# Patient Record
Sex: FEMALE | Race: WHITE | HISPANIC OR LATINO | Employment: UNEMPLOYED | URBAN - METROPOLITAN AREA
[De-identification: names, ages, dates, MRNs, and addresses within clinical notes are randomized per-mention and may not be internally consistent; named-entity substitution may affect disease eponyms.]

---

## 2017-03-14 ENCOUNTER — TELEPHONE (OUTPATIENT)
Dept: FAMILY MEDICINE | Facility: CLINIC | Age: 52
End: 2017-03-14

## 2017-03-14 NOTE — TELEPHONE ENCOUNTER
Panel Management Review      Patient has the following on her problem list: None      Composite cancer screening  Chart review shows that this patient is due/due soon for the following Mammogram and Colonocsopy  Summary:    Patient is due/failing the following:   COLONOSCOPY and MAMMOGRAM    Action needed:   Patient needs referral/order: colonoscopy or FIT test; mammo apt    Type of outreach:    Phone, left message for patient to call back.     Questions for provider review:    None                                                                                                                                    JANET White       Chart routed to Provider .

## 2017-03-17 ENCOUNTER — OFFICE VISIT (OUTPATIENT)
Dept: FAMILY MEDICINE | Facility: CLINIC | Age: 52
End: 2017-03-17
Payer: COMMERCIAL

## 2017-03-17 VITALS
BODY MASS INDEX: 23.3 KG/M2 | SYSTOLIC BLOOD PRESSURE: 114 MMHG | TEMPERATURE: 98.3 F | OXYGEN SATURATION: 97 % | HEART RATE: 54 BPM | WEIGHT: 111 LBS | RESPIRATION RATE: 16 BRPM | DIASTOLIC BLOOD PRESSURE: 63 MMHG | HEIGHT: 58 IN

## 2017-03-17 DIAGNOSIS — Z12.31 ENCOUNTER FOR SCREENING MAMMOGRAM FOR BREAST CANCER: ICD-10-CM

## 2017-03-17 DIAGNOSIS — E03.9 ACQUIRED HYPOTHYROIDISM: ICD-10-CM

## 2017-03-17 DIAGNOSIS — J31.0 CHRONIC RHINITIS: ICD-10-CM

## 2017-03-17 DIAGNOSIS — Z12.11 COLON CANCER SCREENING: Primary | ICD-10-CM

## 2017-03-17 PROCEDURE — 82947 ASSAY GLUCOSE BLOOD QUANT: CPT | Performed by: FAMILY MEDICINE

## 2017-03-17 PROCEDURE — 84443 ASSAY THYROID STIM HORMONE: CPT | Performed by: FAMILY MEDICINE

## 2017-03-17 PROCEDURE — 80061 LIPID PANEL: CPT | Performed by: FAMILY MEDICINE

## 2017-03-17 PROCEDURE — 36415 COLL VENOUS BLD VENIPUNCTURE: CPT | Performed by: FAMILY MEDICINE

## 2017-03-17 PROCEDURE — 99214 OFFICE O/P EST MOD 30 MIN: CPT | Performed by: FAMILY MEDICINE

## 2017-03-17 RX ORDER — LEVOTHYROXINE SODIUM 75 UG/1
75 TABLET ORAL DAILY
Qty: 90 TABLET | Refills: 3 | Status: SHIPPED | OUTPATIENT
Start: 2017-03-17 | End: 2018-05-08

## 2017-03-17 RX ORDER — FLUTICASONE PROPIONATE 50 MCG
SPRAY, SUSPENSION (ML) NASAL
Qty: 16 G | Refills: 0 | Status: SHIPPED | OUTPATIENT
Start: 2017-03-17 | End: 2018-01-12

## 2017-03-17 NOTE — PROGRESS NOTES
SUBJECTIVE:                                                    Bhavani Calderon is a 51 year old female who presents to clinic today for the following health issues:      Hypothyroidism Follow-up      Since last visit, patient describes the following symptoms:  no hair loss, no skin changes, no constipation, no loose stools and weight loss of 6 lbs       Amount of exercise or physical activity: 2-3 days/week for an average of 30-45 minutes    Problems taking medications regularly: No    Medication side effects: none    Diet: regular (no restrictions)      Pt has been having pain in the base of the thumbs, mainly when she open bottles, for the last 2 months.  The pain is when she is writing too or hold something.  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    Problem list and histories reviewed & adjusted, as indicated.  Additional history: as documented    Patient Active Problem List   Diagnosis     CARDIOVASCULAR SCREENING; LDL GOAL LESS THAN 100     Seasonal allergic rhinitis     Rotator cuff syndrome     Pain in joint, shoulder region     Acquired hypothyroidism     No past surgical history on file.    Social History   Substance Use Topics     Smoking status: Never Smoker     Smokeless tobacco: Never Used     Alcohol use No     Family History   Problem Relation Age of Onset     GASTROINTESTINAL DISEASE Father      hepatitis, unclear etiology, may be autoimmune     GASTROINTESTINAL DISEASE Brother      hepatitis, unclear etiology     DIABETES Other          Current Outpatient Prescriptions   Medication Sig Dispense Refill     levothyroxine (SYNTHROID/LEVOTHROID) 75 MCG tablet TAKE 1 TABLET (75 MCG) BY MOUTH DAILY -  SEE MD 30 tablet 0     fluticasone (FLONASE) 50 MCG/ACT spray SPRAY 1-2 SPRAYS INTO BOTH NOSTRILS DAILY 16 g 0     Allergies   Allergen Reactions     Penicillins        Reviewed and updated as needed this visit by clinical staff       Reviewed and updated as needed this visit by Provider         LUCIO:  C:  "NEGATIVE for fever, chills, change in weight  CV: NEGATIVE for chest pain, palpitations or peripheral edema    OBJECTIVE:                                                    /63 (BP Location: Right arm, Patient Position: Chair, Cuff Size: Adult Regular)  Pulse 54  Temp 98.3  F (36.8  C) (Oral)  Resp 16  Ht 4' 10\" (1.473 m)  Wt 111 lb (50.3 kg)  LMP 03/31/2006  SpO2 97%  BMI 23.2 kg/m2  Body mass index is 23.2 kg/(m^2).  GENERAL: healthy, alert and no distress  NECK: no adenopathy, no asymmetry, masses, or scars and thyroid normal to palpation  RESP: lungs clear to auscultation - no rales, rhonchi or wheezes  CV: regular rate and rhythm, normal S1 S2, no S3 or S4, no murmur, click or rub, no peripheral edema and peripheral pulses strong  MS: there is tenderness on the MCp joint of the big thumbs bilaterally, no swelling, with normal ROM of the joints.  The rest of the exam are all normal.           ASSESSMENT/PLAN:                                                            1. Acquired hypothyroidism  Continue on same dose.  - levothyroxine (SYNTHROID/LEVOTHROID) 75 MCG tablet; Take 1 tablet (75 mcg) by mouth daily  Dispense: 90 tablet; Refill: 3  - TSH  - Lipid Profile with reflex to direct LDL  - Glucose    2. Chronic rhinitis    - fluticasone (FLONASE) 50 MCG/ACT spray; SPRAY 1-2 SPRAYS INTO BOTH NOSTRILS DAILY  Dispense: 16 g; Refill: 0    3. Colon cancer screening  Refer to   - GASTROENTEROLOGY ADULT REF PROCEDURE ONLY    4. Encounter for screening mammogram for breast cancer    - *MA Screening Digital Bilateral; Future    Joint pain : mostly related to over use the hands, recommend strengthening exercises, nsaid's and massage, Follow up in 30 days if symptoms persist, sooner if symptoms worsen or new ones develops, pt may contact us over the phone for any questions or concerns.      Manny Martínez MD  [facility]"

## 2017-03-17 NOTE — LETTER
[facility]  [address] [place] [address]  Phone: [phone]    March 17, 2017        Bhavani Calderon  [address] [facility] [address]          To whom it may concern:    RE: Bhavani Calderon    Patient was seen and treated today at our clinic.  Pt does need to her thyroid medication on daily basis.    Please contact me for questions or concerns.      Sincerely,        Manny Martínez MD

## 2017-03-17 NOTE — NURSING NOTE
"Chief Complaint   Patient presents with     Recheck Medication     Refill Request       Initial /63 (BP Location: Right arm, Patient Position: Chair, Cuff Size: Adult Regular)  Pulse 54  Temp 98.3  F (36.8  C) (Oral)  Resp 16  Ht 4' 10\" (1.473 m)  Wt 111 lb (50.3 kg)  LMP 03/31/2006  SpO2 97%  BMI 23.2 kg/m2 Estimated body mass index is 23.2 kg/(m^2) as calculated from the following:    Height as of this encounter: 4' 10\" (1.473 m).    Weight as of this encounter: 111 lb (50.3 kg).  Medication Reconciliation: complete    "

## 2017-03-17 NOTE — MR AVS SNAPSHOT
After Visit Summary   3/17/2017    Bhavani Calderon    MRN: [id number]           Patient Information     Date Of Birth          1965        Visit Information        Provider Department      3/17/2017 2:45 PM Manny Martínez MD [facility]        Today's Diagnoses     Colon cancer screening    -  1    Acquired hypothyroidism        Chronic rhinitis        Encounter for screening mammogram for breast cancer           Follow-ups after your visit        Additional Services     GASTROENTEROLOGY ADULT REF PROCEDURE ONLY       Last Lab Result: Creatinine (mg/dL)       Date                     Value                 11/19/2009               0.88             ----------  Body mass index is 23.2 kg/(m^2).     Needed:  No  Language:  Swiss    Patient will be contacted to schedule procedure.     Please be aware that coverage of these services is subject to the terms and limitations of your health insurance plan.  Call member services at your health plan with any benefit or coverage questions.  Any procedures must be performed at a [facility] facility OR coordinated by your clinic's referral office.    Please bring the following with you to your appointment:    (1) Any X-Rays, CTs or MRIs which have been performed.  Contact the facility where they were done to arrange for  prior to your scheduled appointment.    (2) List of current medications   (3) This referral request   (4) Any documents/labs given to you for this referral                  Future tests that were ordered for you today     Open Future Orders        Priority Expected Expires Ordered    *MA Screening Digital Bilateral Routine  3/17/2018 3/17/2017            Who to contact     If you have questions or need follow up information about today's clinic visit or your schedule please contact [facility] directly at [phone].  Normal or non-critical lab and imaging results will be communicated to you  "by [employer], letter or phone within 4 business days after the clinic has received the results. If you do not hear from us within 7 days, please contact the clinic through [employer] or phone. If you have a critical or abnormal lab result, we will notify you by phone as soon as possible.  Submit refill requests through [employer] or call your pharmacy and they will forward the refill request to us. Please allow 3 business days for your refill to be completed.          Additional Information About Your Visit        [employer] Information     [employer] gives you secure access to your electronic health record. If you see a primary care provider, you can also send messages to your care team and make appointments. If you have questions, please call your primary care clinic.  If you do not have a primary care provider, please call [phone] and they will assist you.        Care EveryWhere ID     This is your Care EveryWhere ID. This could be used by other organizations to access your [facility] medical records  [id number]        Your Vitals Were     Pulse Temperature Respirations Height Last Period Pulse Oximetry    54 98.3  F (36.8  C) (Oral) 16 4' 10\" (1.473 m) 03/31/2006 97%    BMI (Body Mass Index)                   23.2 kg/m2            Blood Pressure from Last 3 Encounters:   03/17/17 114/63   09/08/16 104/64   01/21/16 123/58    Weight from Last 3 Encounters:   03/17/17 111 lb (50.3 kg)   09/08/16 112 lb (50.8 kg)   01/21/16 117 lb (53.1 kg)              We Performed the Following     GASTROENTEROLOGY ADULT REF PROCEDURE ONLY     Glucose     Lipid Profile with reflex to direct LDL     TSH          Today's Medication Changes          These changes are accurate as of: 3/17/17  3:20 PM.  If you have any questions, ask your nurse or doctor.               These medicines have changed or have updated prescriptions.        Dose/Directions    fluticasone 50 MCG/ACT spray   Commonly known as:  FLONASE   This may have changed:  See " the new instructions.   Used for:  Chronic rhinitis   Changed by:  Manny Martínez MD        SPRAY 1-2 SPRAYS INTO BOTH NOSTRILS DAILY   Quantity:  16 g   Refills:  0       levothyroxine 75 MCG tablet   Commonly known as:  SYNTHROID/LEVOTHROID   This may have changed:  See the new instructions.   Used for:  Acquired hypothyroidism   Changed by:  Manny Martínez MD        Dose:  75 mcg   Take 1 tablet (75 mcg) by mouth daily   Quantity:  90 tablet   Refills:  3            Where to get your medicines      These medications were sent to [id number] - [place], [place] - [address]  [address] [facility] [address]    Hours:  9Am-9Pm (M-F) 9Am-6Pm (S&S) Phone:  [phone]     fluticasone 50 MCG/ACT spray    levothyroxine 75 MCG tablet                Primary Care Provider Office Phone # Fax #    Manny Martínez [phone]       [facility] [address] [place] [address]        Thank you!     Thank you for choosing [facility]  for your care. Our goal is always to provide you with excellent care. Hearing back from our patients is one way we can continue to improve our services. Please take a few minutes to complete the written survey that you may receive in the mail after your visit with us. Thank you!             Your Updated Medication List - Protect others around you: Learn how to safely use, store and throw away your medicines at www.disposemymeds.org.          This list is accurate as of: 3/17/17  3:20 PM.  Always use your most recent med list.                   Brand Name Dispense Instructions for use    fluticasone 50 MCG/ACT spray    FLONASE    16 g    SPRAY 1-2 SPRAYS INTO BOTH NOSTRILS DAILY       levothyroxine 75 MCG tablet    SYNTHROID/LEVOTHROID    90 tablet    Take 1 tablet (75 mcg) by mouth daily

## 2017-03-18 LAB
CHOLEST SERPL-MCNC: 183 MG/DL
GLUCOSE SERPL-MCNC: 101 MG/DL (ref 70–99)
HDLC SERPL-MCNC: 58 MG/DL
LDLC SERPL CALC-MCNC: 108 MG/DL
NONHDLC SERPL-MCNC: 125 MG/DL
TRIGL SERPL-MCNC: 83 MG/DL
TSH SERPL DL<=0.05 MIU/L-ACNC: 6.53 MU/L (ref 0.4–4)

## 2017-03-21 ENCOUNTER — TELEPHONE (OUTPATIENT)
Dept: FAMILY MEDICINE | Facility: CLINIC | Age: 52
End: 2017-03-21

## 2017-03-21 DIAGNOSIS — E06.3 HYPOTHYROIDISM DUE TO HASHIMOTO'S THYROIDITIS: Primary | ICD-10-CM

## 2017-03-21 NOTE — TELEPHONE ENCOUNTER
Please send letter with labs.  TSH is abnormal, I don't like to change the dose at this time, but recommend repeating the blood test in 3 months (june)  Labs put in already.  Manny Martínez MD  [facility]  [phone]  r

## 2017-03-21 NOTE — LETTER
[facility]  [address] [place], [address]  [phone]      March 21, 2017    Bhavani Calderon  [address] [facility] [facility] [address]          Dear Bhavani,    The results of your recent test shows your TSH (thyroid) is abnormal.  I don't want to change the dose of your medication at this time.  I do recommend repeating the blood test in 3 months (June).  Enclosed is a copy of the results.  It was a pleasure to see you at your last appointment.    If you have any questions or concerns, please call myself or my nurse at [phone].          Sincerely,    Manny Martínez MD      Results for orders placed or performed in visit on 03/17/17   TSH   Result Value Ref Range    TSH 6.53 (H) 0.40 - 4.00 mU/L   Lipid Profile with reflex to direct LDL   Result Value Ref Range    Cholesterol 183 <200 mg/dL    Triglycerides 83 <150 mg/dL    HDL Cholesterol 58 >49 mg/dL    LDL Cholesterol Calculated 108 (H) <100 mg/dL    Non HDL Cholesterol 125 <130 mg/dL   Glucose   Result Value Ref Range    Glucose 101 (H) 70 - 99 mg/dL

## 2017-03-22 ENCOUNTER — RADIANT APPOINTMENT (OUTPATIENT)
Dept: MAMMOGRAPHY | Facility: CLINIC | Age: 52
End: 2017-03-22
Payer: COMMERCIAL

## 2017-03-22 DIAGNOSIS — Z12.31 ENCOUNTER FOR SCREENING MAMMOGRAM FOR BREAST CANCER: ICD-10-CM

## 2017-03-22 PROCEDURE — G0202 SCR MAMMO BI INCL CAD: HCPCS | Mod: TC

## 2017-04-07 ENCOUNTER — HOSPITAL ENCOUNTER (OUTPATIENT)
Facility: CLINIC | Age: 52
Discharge: HOME | End: 2017-04-07
Attending: INTERNAL MEDICINE | Admitting: INTERNAL MEDICINE
Payer: COMMERCIAL

## 2017-04-07 VITALS
SYSTOLIC BLOOD PRESSURE: 127 MMHG | OXYGEN SATURATION: 95 % | RESPIRATION RATE: 12 BRPM | DIASTOLIC BLOOD PRESSURE: 81 MMHG

## 2017-04-07 LAB — COLONOSCOPY: NORMAL

## 2017-04-07 PROCEDURE — G0121 COLON CA SCRN NOT HI RSK IND: HCPCS | Performed by: INTERNAL MEDICINE

## 2017-04-07 PROCEDURE — 25000125 ZZHC RX 250: Performed by: INTERNAL MEDICINE

## 2017-04-07 PROCEDURE — 25000128 H RX IP 250 OP 636: Performed by: INTERNAL MEDICINE

## 2017-04-07 PROCEDURE — G0500 MOD SEDAT ENDO SERVICE >5YRS: HCPCS | Performed by: INTERNAL MEDICINE

## 2017-04-07 PROCEDURE — 45378 DIAGNOSTIC COLONOSCOPY: CPT | Performed by: INTERNAL MEDICINE

## 2017-04-07 RX ORDER — ONDANSETRON 4 MG/1
4 TABLET, ORALLY DISINTEGRATING ORAL EVERY 6 HOURS PRN
Status: DISCONTINUED | OUTPATIENT
Start: 2017-04-07 | End: 2017-04-07 | Stop reason: HOSPADM

## 2017-04-07 RX ORDER — FLUMAZENIL 0.1 MG/ML
0.2 INJECTION, SOLUTION INTRAVENOUS
Status: DISCONTINUED | OUTPATIENT
Start: 2017-04-07 | End: 2017-04-07 | Stop reason: HOSPADM

## 2017-04-07 RX ORDER — LIDOCAINE 40 MG/G
CREAM TOPICAL
Status: DISCONTINUED | OUTPATIENT
Start: 2017-04-07 | End: 2017-04-07 | Stop reason: HOSPADM

## 2017-04-07 RX ORDER — ONDANSETRON 2 MG/ML
4 INJECTION INTRAMUSCULAR; INTRAVENOUS EVERY 6 HOURS PRN
Status: DISCONTINUED | OUTPATIENT
Start: 2017-04-07 | End: 2017-04-07 | Stop reason: HOSPADM

## 2017-04-07 RX ORDER — FENTANYL CITRATE 50 UG/ML
INJECTION, SOLUTION INTRAMUSCULAR; INTRAVENOUS PRN
Status: DISCONTINUED | OUTPATIENT
Start: 2017-04-07 | End: 2017-04-07 | Stop reason: HOSPADM

## 2017-04-07 RX ORDER — NALOXONE HYDROCHLORIDE 0.4 MG/ML
.1-.4 INJECTION, SOLUTION INTRAMUSCULAR; INTRAVENOUS; SUBCUTANEOUS
Status: DISCONTINUED | OUTPATIENT
Start: 2017-04-07 | End: 2017-04-07 | Stop reason: HOSPADM

## 2017-04-07 RX ORDER — ONDANSETRON 2 MG/ML
4 INJECTION INTRAMUSCULAR; INTRAVENOUS
Status: COMPLETED | OUTPATIENT
Start: 2017-04-07 | End: 2017-04-07

## 2017-04-07 NOTE — H&P
Pre-Endoscopy History and Physical     Bhavani Calderon MRN# [id number]   YOB: 1965 Age: 51 year old     Date of Procedure: 4/7/2017  Primary care provider: Manny Martínez  Type of Endoscopy: colonoscopy  Reason for Procedure: screening  Type of Anesthesia Anticipated: Conscious Sedation    HPI:    Bhavnai is a 51 year old female who will be undergoing the above procedure.      A history and physical has been performed. The patient's medications and allergies have been reviewed. The risks and benefits of the procedure and the sedation options and risks were discussed with the patient.  All questions were answered and informed consent was obtained.      She denies a personal or family history of anesthesia complications or bleeding disorders.     Patient Active Problem List   Diagnosis     CARDIOVASCULAR SCREENING; LDL GOAL LESS THAN 100     Seasonal allergic rhinitis     Rotator cuff syndrome     Pain in joint, shoulder region     Hypothyroidism due to Hashimoto's thyroiditis        Past Medical History:   Diagnosis Date     Acquired hypothyroidism 1/21/2016     Grave's disease     High TSI titer, s/p I-131May 2009     Hypothyroidism due to Hashimoto's thyroiditis 3/21/2017     Menopausal state      Rotator cuff syndrome 7/16/2014     Seasonal allergic rhinitis 4/11/2014        History reviewed. No pertinent surgical history.    Relevant Family History: NONE    Relevant Social History: NONE     Prior to Admission medications    Medication Sig Start Date End Date Taking? Authorizing Provider   levothyroxine (SYNTHROID/LEVOTHROID) 75 MCG tablet Take 1 tablet (75 mcg) by mouth daily 3/17/17  Yes Manny Martínez MD   fluticasone (FLONASE) 50 MCG/ACT spray SPRAY 1-2 SPRAYS INTO BOTH NOSTRILS DAILY 3/17/17  Yes Manny Martínez MD       Allergies   Allergen Reactions     Penicillins         REVIEW OF SYSTEMS:   A relevant review of systems was performed and was negative    PHYSICAL EXAM:   /68  Resp 12  LMP  "03/31/2006  SpO2 98% Estimated body mass index is 23.2 kg/(m^2) as calculated from the following:    Height as of 3/17/17: 1.473 m (4' 10\").    Weight as of 3/17/17: 50.3 kg (111 lb).   GENERAL APPEARANCE: alert, and oriented  MENTAL STATUS: alert  AIRWAY EXAM: Normal  RESP: lungs clear to auscultation - no rales, rhonchi or wheezes  CV: regular rates and rhythm  DIAGNOSTICS:    Not indicated    IMPRESSION   ASA Class 2 - Mild systemic disease    PLAN:   Plan for colonoscopy. We discussed the risks, benefits and alternatives and the patient wished to proceed.      Signed Electronically by: Christos Pena  April 7, 2017            "

## 2017-04-07 NOTE — DISCHARGE INSTRUCTIONS

## 2017-04-07 NOTE — IP AVS SNAPSHOT
[facility]    [address]    [facility] [address]    Phone:  [phone]    Fax:  [phone]                                       After Visit Summary   4/7/2017    Bhavani Calderon    MRN: [id number]           After Visit Summary Signature Page     I have received my discharge instructions, and my questions have been answered. I have discussed any challenges I see with this plan with the nurse or doctor.    ..........................................................................................................................................  Patient/Patient Representative Signature      ..........................................................................................................................................  Patient Representative Print Name and Relationship to Patient    ..................................................               ................................................  Date                                            Time    ..........................................................................................................................................  Reviewed by Signature/Title    ...................................................              ..............................................  Date                                                            Time

## 2017-04-07 NOTE — LETTER
March 24, 2017      Bhavani Calderon  [address] [facility] [address]              Dear Bhavnai Calderon,    Thank you for choosing [facility]. You are scheduled for the following service(s).   Miralax Prep    Procedure:   Colonoscopy   Provider:         Dr. Christos Pena MD  Date:    4/07/2017                Arrival Time:   8:00 AM  *check in at Emergency/Endoscopy desk*  Procedure Time:  8:30 AM     Location:   [facility]      Endoscopy Department, First Floor (Enter through ER Doors) *       [address] [place], [place] [address]    [phone] or [phone] () to reschedule   What is a colonoscopy?  A colonoscopy is the most accurate test to detect colon polyps and colon cancer, and the only test where polyps can be removed. During this procedure, a doctor examines the lining of your large intestine and rectum through a flexible tube called a colonoscope.   To produce the best and most accurate results, your colon must be completely clean. You will drink a special bowel cleansing preparation to help clean out your colon. You will also need to follow a special diet several days prior to your scheduled colonoscopy.  What happens during a colonoscopy?  Plan to spend up to two hours, starting at registration time, at the endoscopy center the day of your procedure. The exam itself takes approximately 15 minutes to complete, and recovery is approximately 30 minutes.     Before the exam:    You will change into a gown.    Your medical history will be reviewed with you and you will be given a consent form to sign.     A nurse will insert an intravenous (IV) line into your hand or arm.  During the exam:     Medicine will be given through the IV line to help you relax and feel drowsy.     Your heart rate and oxygen levels will be monitored. If your blood pressure is low, you may be given fluids through the IV line.     The doctor will insert a  flexible hollow tube, called a colonoscope, into your rectum.   The scope will be advanced slowly through the large intestine (colon).    You may have a feeling of pressure or fullness.     If an abnormal tissue, or a polyp are found, the doctor may remove it through the endoscope for closer examination, or biopsy. Tissue removal is painless.  What happens after the exam?           The doctor will talk with you about the initial results of your exam.     The doctor will prepare a full report for the physician who referred you for the colonoscopy.     You may feel bloated after the procedure. This is normal.     Medication given during the exam will prohibit you from driving for the rest of the day.     Following the exam, you may resume your normal diet. Avoid alcohol until the next day.     You may resume your regular activities the day after the procedure.     A nurse will provide you with complete discharge instructions before you leave the endoscopy center. Be sure to ask the nurse for specific instructions if you take blood thinners such as aspirin, Coumadin or Plavix.     Any tissue samples removed during the exam will be sent to a lab for evaluation. It may take 5-7 working days for you to be notified of the results.     Miralax-Gatorade  If you have diabetes, ask your regular doctor for diet and medication restrictions.   If you take a medication to thin your blood, such as coumadin or warfarin, please call your primary care provider for directions on when to stop this medication.  If you take aspirin, you may continue to do so.  If you are or may be pregnant, please discuss the risks and benefits of this procedure with your doctor.  You must arrange for a ride for the day of your exam. If you fail to arrange transportation with a responsible adult (someone you know and trust) your procedure will need to be cancelled and rescheduled.  If you must cancel or reschedule your appointment, please call  [phone] as soon as possible.        PREPARATION  To ensure a successful exam, please follow all instructions carefully. Failure to accurately and completely prepare for your exam may result in the need for an additional procedure and both procedures will be billed to your insurance.     Purchase the following over-the-counter supplies at your local pharmacy:      ? 2 tablets bisacodyl, each containing 5 mg of bisacodyl (Dulcolax  laxative NOT Dulcolax  stool softener)   ? 1-8.3 oz. bottle Miralax  powder (238 grams)   ? 64 oz. Gatorade  liquid (NOT red; NOT powdered). Regular Gatorade , Gatorade G2 , Powerade  or  PoweradeZero  are acceptable.   ? 1-10 oz. bottle Magnesium Citrate (NOT red)  7 days before your exam:  Discontinue fiber supplements or medications containing iron. This includes multivitamins with iron, Metamucil  and Fibercon .    3 Days prior to your exam:  Stop eating all high-fiber foods and begin a Low-Fiber Diet. A low fiber diet helps make the cleanout more effective.     Examples of a Low Fiber Diet include:     White bread, white rice, pasta, potato without skin, plain crackers     Fish, white meat chicken, eggs, peanut butter without nuts     Clear beverages (apple juice, white grape juice, Sprite , sparkling water, Gatorade )     Cooked carrots, cooked green beans, cooked spinach        Milk, plain yogurt, cheese     Jelly, salt, pepper, sugar  Avoid: Raw fruits or vegetables, whole wheat or high fiber foods, seeds, nuts, popcorn, bran or bulking agents     2 days prior to your exam     Continue Low Fiber Diet.     Drink at least 8 (eight ounces) glasses of water throughout the day.     Refrigerate the Gatorade  or Powerade , if you wish to drink it cold.     Stop eating solid foods at 11:45 pm.    1 day prior to your exam  Start a Clear Liquid Diet   Examples of a Clear Liquid Diet:     No red liquids; No coffee; No alcohol; No dairy products     May drink clear caffeinated  beverages    Water: drink at least 8 glasses of water during the day     Tea (do not add milk or creamer)     Clear broth or bouillon     Gatorade , Pedialyte  or Powerade  (No red)     Carbonated and non-carbonated soft drinks (Sprite , 7-Up , Ginger ale)     Strained fruit juices without pulp (apple, white grape, white cranberry)     Jell-O , popsicles, hard candy (No red)    At 12 Noon:  Take the 2 bisacodyl (Dulcolax ) tablets    At 4 PM (no later than 6 PM)    Mix 1 bottle of Miralax  (8.3 oz) with 64 oz. of Gatorade  in a large pitcher.     Drink 1 - 8 oz. glass of the Miralax /Gatorade  solution.     Continue drinking 1 - 8 oz. glass every 15 minutes thereafter until the mixture is gone.     Continue clear liquid diet     Colon Cleansing Tips     If you experience nausea or vomiting while taking the prep, rinse your mouth with water,  or mouthwash , take a 15-30 minute break, and then continue taking the prep solution. If you are still unable to complete the prep, without severe nausea or vomiting, you will need to contact your primary care provider (the provider who ordered the test) for a possible anti-nausea medication. Or if you are prone to nausea you may want to ask your primary care provider to prescribe an as needed anti-nausea medication that you may have on hand.    Chill the Miralax /Gatorade  solution in the refrigerator. DO NOT add ice to the solution or your drinking glass.      Set a timer for every 15 minutes. Drink each 8 - oz. glass of solution quickly to help flush your colon.     Stay near a toilet! You will have diarrhea.     Even if you are sitting on the toilet, continue to drink the cleansing solution every 15 minutes.     Drink all of the solution until it is gone.     You will be uncomfortable until the stool has flushed from your colon (in about 2-4 hours). You may feel chilled.     You may suck on a few hard candies (NO red).     Alcohol-free baby wipes or Vaseline  may help ease  skin irritation.     Over-the-counter hydrocortisone creams, hemorrhoid treatments or Tucks may be used if desired.    The day of your exam:            Continue clear liquid diet. Do not eat solid foods.     You may take all of your morning medications.    4 hours before your procedure:     Drink 10 oz. of Magnesium Citrate.    2 hours before your procedure:     Stop drinking clear liquids.     Allow extra time to travel to your procedure as you may need to stop and use a restroom along the way.  You are ready for the exam, if you followed all instructions and your stool is no longer formed, but clear or yellow liquid. If you are unsure whether your colon is clean, please call our department at [phone] before you leave for your appointment.      Bring a list of all of your current medications, including any allergy or over-the-counter medications.      Bring a photo ID, as well as up-to-date insurance information, such as your insurance card and any referral forms that might be required by your insurance company.  DIRECTIONS  From the north ([facility], [place])  Take [address] south, exit to [address] [place] [address]. Get into the left hand chanda, turn left (east), go one-half mile to [address]. Turn left (north) on [address]. Go north to first stoplight, take a right on the newly constructed [address] and follow it to the Emergency entrance.  From the south ([facility])  Take 35 north to the east split, [address], and exit to [address] [place] [address]. Turn left (west) on [address] [address] to [address]. Turn right (north) on [address]. Go north to first stoplight, take a right on the newly [address] and follow it to the Emergency entrance.    From the east via 35E ([facility])  Take [address]E south to [address] [place] [address] exit. Turn right on [address] [address]. Go west to [address]. Turn right (north) on [address], go to the first stoplight, take a right and  follow the newly constructed road, [address], to the Emergency entrance.    From the east via [address] ([facility])  Take [address] west to [address]. Turn left (south) on [address] to [address]. Turn left (east) on [address] and follow the newly constructed road to the Emergency entrance.    From the west via [address] 13 ([place])  Take [address] east to [address]. Turn right (south) on [address] to [address].  Turn left (east) on [address] and follow the newly constructed road to the Emergency entrance.  Cut along line  -------------------------------------------------------------------------------------------------------------------  SHOPPING LIST FOR OVER-THE-COUNTER COLONOSCOPY PREP:  ? 2 tablets bisacodyl, each containing 5 mg of bisacodyl (Dulcolax  laxative                     NOT Dulcolax  stool softener)   ? 1-8.3 oz. bottle Miralax  powder (238 grams)   ? 64 oz. Gatorade  liquid (NOT red; NOT powdered). Regular Gatorade ,                     Gatorade G2 , Powerade  or  PoweradeZero  are acceptable.   ? 1-10 oz. bottle Magnesium Citrate (NOT red)

## 2017-04-07 NOTE — IP AVS SNAPSHOT
MRN:[id number]                      After Visit Summary   4/7/2017    Bhavani Calderon    MRN: [id number]           Thank you!     Thank you for choosing [facility] for your care. Our goal is always to provide you with excellent care. Hearing back from our patients is one way we can continue to improve our services. Please take a few minutes to complete the written survey that you may receive in the mail after you visit. If you would like to speak to someone directly about your visit please contact Patient Relations at [phone]. Thank you!          Patient Information     Date Of Birth          1965        About your hospital stay     You were admitted on:  April 7, 2017 You last received care in the:  [facility]    You were discharged on:  April 7, 2017       Who to Call     For medical emergencies, please call 911.  For non-urgent questions about your medical care, please call your primary care provider or clinic, [phone]  For questions related to your surgery, please call your surgery clinic        Attending Provider     Provider Specialty    Link, Christos STEVEN MD Gastroenterology       Primary Care Provider Office Phone # Fax #    Manny Martínez [phone]       [facility] [address]  [id number]        Further instructions from your care team         Understanding Diverticulosis and Diverticulitis     Pouches or diverticula usually occur in the lower part of the colon called the sigmoid.      Diverticulitis occurs when the pouches become inflamed.     The colon (large intestine) is the last part of the digestive tract. It absorbs water from stool and changes it from a liquid to a solid. In certain cases, small pouches called diverticula can form in the colon wall. This condition is called diverticulosis. The pouches can become infected. If this happens, it becomes a more serious problem called diverticulitis.  These problems can be painful. But they can be managed.   Managing Your Condition  Diet changes or taking medications are often tried first. These may be enough to bring relief. If the case is bad, surgery may be done. You and your doctor can discuss the plan that is best for you.  If You Have Diverticulosis  Diet changes are often enough to control symptoms. The main changes are adding fiber (roughage) and drinking more water. Fiber absorbs water as it travels through your colon. This helps your stool stay soft and move smoothly. Water helps this process. If needed, you may be told to take over-the-counter stool softeners. To help relieve pain, antispasmodic medications may be prescribed.  If You Have Diverticulitis  Treatment depends on how bad your symptoms are.  For mild symptoms: You may be put on a liquid diet for a short time. You may also be prescribed antibiotics. If these two steps relieve your symptoms, you may then be prescribed a high-fiber diet. If you still have symptoms, your doctor will discuss further treatment options with you.  For severe symptoms: You may need to be admitted to the hospital. There, you can be given IV antibiotics and fluids. Once symptoms are under control, the above treatments may be tried. If these don t control your condition, your doctor may discuss the option of having surgery with you.  [place] to Colon Health  Help keep your colon healthy with a diet that includes plenty of high-fiber fruits, vegetables, and whole grains. Drink plenty of liquids like water and juice. Your doctor may also recommend avoiding seeds and nuts.          [id number] [address], [place] [address]. All rights reserved. This information is not intended as a substitute for professional medical care. Always follow your healthcare professional's instructions.    Eating a High-Fiber Diet  Fiber is what gives strength and structure to plants. Most grains, beans, vegetables, and  fruits contain fiber. Foods rich in fiber are often low in calories and fat, and they fill you up more. They may also reduce your risks for certain health problems. To find out the amount of fiber in canned, packaged, or frozen foods, read the  Nutrition Facts  label. It tells you how much fiber is in a serving.      Types of Fiber and Their Benefits  There are two types of fiber: insoluble and soluble. They both aid digestion and help you maintain a healthy weight.  Insoluble fiber: This is found in whole grains, cereals, certain fruits and vegetables (such as apple skin, corn, and carrots). Insoluble fiber may prevent constipation and reduce the risk of certain types of cancer.   Soluble fiber: This type of fiber is in oats, beans, and certain fruits and vegetables (such as strawberries and peas). Soluble fiber can reduce cholesterol (which may help lower the risk of heart disease), and helps control blood sugar levels.  Look for High-Fiber Foods  Whole-grain breads and cereals: Try to eat 6-8 ounces a day. Include wheat and oat bran cereals, whole-wheat muffins or toast, and corn tortillas in your meals.  Fruits: Try to eat 2 cups a day. Apples, oranges, strawberries, pears, and bananas are good sources. (Note: Fruit juice is low in fiber.)  Vegetables: Try to eat 3 cups a day. Add asparagus, carrots, broccoli, peas, and corn to your meals.  Legumes (beans): One cup of cooked lentils gives you over 15 grams of fiber. Try navy beans, lentils, and chickpeas.  Seeds:  A small handful of seeds gives you about 3 grams of fiber. Try sunflower seeds.    Keep Track of Your Fiber  A healthy diet includes 31 grams of fiber a day if you have a 2,000-calorie diet. Keep track of how much fiber you eat. Start by reading food labels. Then eat a variety of foods high in fiber. Ask your doctor about supplemental fiber products.            9568-2603 Tami Garg, [address] [facility], [place] [address]. All rights reserved.  This information is not intended as a substitute for professional medical care. Always follow your healthcare professional's instructions.    Pending Results     No orders found from 4/5/2017 to 4/8/2017.            Admission Information     Date & Time Provider Department Dept. Phone    4/7/2017 Link, Christos STEVEN MD [facility] [phone]      Your Vitals Were     Blood Pressure Respirations Last Period Pulse Oximetry          128/82 14 03/31/2006 96%        MyChart Information     "[employer]t gives you secure access to your electronic health record. If you see a primary care provider, you can also send messages to your care team and make appointments. If you have questions, please call your primary care clinic.  If you do not have a primary care provider, please call [phone] and they will assist you.        Care EveryWhere ID     This is your Care EveryWhere ID. This could be used by other organizations to access your [facility] medical records  [id number]           Review of your medicines      UNREVIEWED medicines. Ask your doctor about these medicines        Dose / Directions    fluticasone 50 MCG/ACT spray   Commonly known as:  FLONASE   Used for:  Chronic rhinitis        SPRAY 1-2 SPRAYS INTO BOTH NOSTRILS DAILY   Quantity:  16 g   Refills:  0       levothyroxine 75 MCG tablet   Commonly known as:  SYNTHROID/LEVOTHROID   Used for:  Acquired hypothyroidism        Dose:  75 mcg   Take 1 tablet (75 mcg) by mouth daily   Quantity:  90 tablet   Refills:  3                Protect others around you: Learn how to safely use, store and throw away your medicines at www.disposemymeds.org.             Medication List: This is a list of all your medications and when to take them. Check marks below indicate your daily home schedule. Keep this list as a reference.      Medications           Morning Afternoon Evening Bedtime As Needed    fluticasone 50 MCG/ACT spray   Commonly known as:  FLONASE   SPRAY 1-2  SPRAYS INTO BOTH NOSTRILS DAILY                                levothyroxine 75 MCG tablet   Commonly known as:  SYNTHROID/LEVOTHROID   Take 1 tablet (75 mcg) by mouth daily

## 2018-01-12 DIAGNOSIS — J31.0 CHRONIC RHINITIS: ICD-10-CM

## 2018-01-12 NOTE — TELEPHONE ENCOUNTER
"Requested Prescriptions   Pending Prescriptions Disp Refills     fluticasone (FLONASE) 50 MCG/ACT spray [Pharmacy Med Name: Fluticasone Propionate Nasal Suspension 50 MCG/ACT] 16 g 0     Sig: SPRAY 1 TO 2 SPRAYS INTO BOTH NOSTRILS DAILY.    Inhaled Steroids Protocol Passed    1/12/2018  2:29 PM       Passed - Patient is age 12 or older       Passed - Recent or future visit with authorizing provider's specialty    Patient had office visit in the last year or has a visit in the next 30 days with authorizing provider.  See \"Patient Info\" tab in inbasket, or \"Choose Columns\" in Meds & Orders section of the refill encounter.               Last Written Prescription Date:  03/17/17  Last Fill Quantity: 16g,  # refills: 0   Last Office Visit with G, P or [facility] prescribing provider:  03/17/17 w/Dr Martínez   Future Office Visit:       "

## 2018-01-16 RX ORDER — FLUTICASONE PROPIONATE 50 MCG
SPRAY, SUSPENSION (ML) NASAL
Qty: 16 G | Refills: 0 | Status: SHIPPED | OUTPATIENT
Start: 2018-01-16 | End: 2018-05-14

## 2018-01-16 NOTE — TELEPHONE ENCOUNTER
Prescription approved per [facility] Refill Protocol.    Gabriela AMAYA RN, BSN, PHN  [place] Flex RN

## 2018-05-08 DIAGNOSIS — E03.9 ACQUIRED HYPOTHYROIDISM: ICD-10-CM

## 2018-05-08 NOTE — LETTER
May 9, 2018          Bhavani Calderon  [address] [facility] [address]        Dear Bhavani,     We sent a one month refill of levothyroxine to your pharmacy today. This is a reminder to schedule an office visit with your doctor before the next refill. Your last clinic visit was   March 17, 2017.    Taking care of your health is important to us and ongoing visits with a provider are vital to your care. Please let us know if you have any questions about this reminder.    Sincerely,     KERA Wilkinson - Care Team of Manny Martínez MD

## 2018-05-08 NOTE — TELEPHONE ENCOUNTER
"Last Written Prescription Date:  3/17/17  Last Fill Quantity: 90 tablet,  # refills: 3   Last office visit: 3/17/2017 with prescribing provider:  Mauricio   Future Office Visit:      Requested Prescriptions   Pending Prescriptions Disp Refills     levothyroxine (SYNTHROID/LEVOTHROID) 75 MCG tablet [Pharmacy Med Name: Levothyroxine Sodium Oral Tablet 75 MCG] 90 tablet 2     Sig: TAKE 1 TABLET BY MOUTH ONCE DAILY.    Thyroid Protocol Failed    5/8/2018  7:00 AM       Failed - Recent (12 mo) or future (30 days) visit within the authorizing provider's specialty    Patient had office visit in the last 12 months or has a visit in the next 30 days with authorizing provider or within the authorizing provider's specialty.  See \"Patient Info\" tab in inbasket, or \"Choose Columns\" in Meds & Orders section of the refill encounter.           Failed - Normal TSH on file in past 12 months    Recent Labs   Lab Test  03/17/17   1523   TSH  6.53*             Passed - Patient is 12 years or older       Passed - No active pregnancy on record    If patient is pregnant or has had a positive pregnancy test, please check TSH.         Passed - No positive pregnancy test in past 12 months    If patient is pregnant or has had a positive pregnancy test, please check TSH.            "

## 2018-05-09 RX ORDER — LEVOTHYROXINE SODIUM 75 UG/1
TABLET ORAL
Qty: 30 TABLET | Refills: 0 | Status: SHIPPED | OUTPATIENT
Start: 2018-05-09 | End: 2018-07-02

## 2018-05-09 NOTE — TELEPHONE ENCOUNTER
Medication is being filled for 1 time refill only due to:  Patient needs to be seen because it has been more than one year since last visit. .   #30   Letter mailed  Prescription approved per [facility] Refill Protocol.  Minh Desir RN

## 2018-05-14 DIAGNOSIS — J31.0 CHRONIC RHINITIS: ICD-10-CM

## 2018-05-14 NOTE — TELEPHONE ENCOUNTER
"Requested Prescriptions   Pending Prescriptions Disp Refills     fluticasone (FLONASE) 50 MCG/ACT spray [Pharmacy Med Name: Fluticasone Propionate Nasal Suspension 50 MCG/ACT]  Last Written Prescription Date:  1/16/2018  Last Fill Quantity: 16 g,  # refills: 0   Last office visit: 3/17/2017 with prescribing provider:  Mauricio    16 g 0     Sig: SPRAY 1 TO 2 SPRAYS INTO BOTH NOSTRILS DAILY.    Inhaled Steroids Protocol Failed    5/14/2018  3:44 PM       Failed - Recent (12 mo) or future (30 days) visit within the authorizing provider's specialty    Patient had office visit in the last 12 months or has a visit in the next 30 days with authorizing provider or within the authorizing provider's specialty.  See \"Patient Info\" tab in inbasket, or \"Choose Columns\" in Meds & Orders section of the refill encounter.           Passed - Patient is age 12 or older          "

## 2018-05-15 RX ORDER — FLUTICASONE PROPIONATE 50 MCG
SPRAY, SUSPENSION (ML) NASAL
Qty: 1 BOTTLE | Refills: 0 | Status: SHIPPED | OUTPATIENT
Start: 2018-05-15 | End: 2018-07-20

## 2018-05-15 NOTE — TELEPHONE ENCOUNTER
Medication is being filled for 1 time refill only due to:  Patient needs to be seen because it has been more than one year since last visit.     1 time refill issued. Per May 8th refill encounter, letter already mailed to the Pt advising OV.     Kasie Rubio RN -- [facility]

## 2018-07-02 DIAGNOSIS — E03.9 ACQUIRED HYPOTHYROIDISM: ICD-10-CM

## 2018-07-03 RX ORDER — LEVOTHYROXINE SODIUM 75 UG/1
TABLET ORAL
Qty: 7 TABLET | Refills: 0 | Status: SHIPPED | OUTPATIENT
Start: 2018-07-03 | End: 2018-07-20

## 2018-07-03 NOTE — TELEPHONE ENCOUNTER
Prescription approved per [facility] Refill Protocol. See 5/9/18 letter informing due for visit and #30 sent. No future appointment scheduled.   #7 only today. No further refills per Dr. Ra Linda message sent today.   Minh Desir RN

## 2018-07-03 NOTE — TELEPHONE ENCOUNTER
"Requested Prescriptions   Pending Prescriptions Disp Refills     levothyroxine (SYNTHROID/LEVOTHROID) 75 MCG tablet [Pharmacy Med Name: Levothyroxine Sodium Oral Tablet 75 MCG] 30 tablet 0     Sig: TAKE ONE TABLET BY MOUTH ONE TIME DAILY. NEED AN APPOINTMENT FOR MORE REFILLS.    Thyroid Protocol Failed    7/2/2018  2:03 PM       Failed - Recent (12 mo) or future (30 days) visit within the authorizing provider's specialty    Patient had office visit in the last 12 months or has a visit in the next 30 days with authorizing provider or within the authorizing provider's specialty.  See \"Patient Info\" tab in inbasket, or \"Choose Columns\" in Meds & Orders section of the refill encounter.           Failed - Normal TSH on file in past 12 months    Recent Labs   Lab Test  03/17/17   1523   TSH  6.53*             Passed - Patient is 12 years or older       Passed - No active pregnancy on record    If patient is pregnant or has had a positive pregnancy test, please check TSH.         Passed - No positive pregnancy test in past 12 months    If patient is pregnant or has had a positive pregnancy test, please check TSH.          Last refill 3/17/2017-Dr Martínez  "

## 2018-07-10 ENCOUNTER — APPOINTMENT (OUTPATIENT)
Dept: GENERAL RADIOLOGY | Facility: CLINIC | Age: 53
End: 2018-07-10
Attending: EMERGENCY MEDICINE
Payer: COMMERCIAL

## 2018-07-10 ENCOUNTER — HOSPITAL ENCOUNTER (EMERGENCY)
Facility: CLINIC | Age: 53
Discharge: HOME | End: 2018-07-11
Attending: EMERGENCY MEDICINE | Admitting: EMERGENCY MEDICINE
Payer: COMMERCIAL

## 2018-07-10 VITALS
RESPIRATION RATE: 18 BRPM | HEART RATE: 53 BPM | SYSTOLIC BLOOD PRESSURE: 123 MMHG | OXYGEN SATURATION: 98 % | WEIGHT: 113 LBS | HEIGHT: 60 IN | DIASTOLIC BLOOD PRESSURE: 57 MMHG | TEMPERATURE: 98 F | BODY MASS INDEX: 22.19 KG/M2

## 2018-07-10 DIAGNOSIS — S86.011A STRAIN OF RIGHT ACHILLES TENDON, INITIAL ENCOUNTER: ICD-10-CM

## 2018-07-10 DIAGNOSIS — S32.10XA CLOSED FRACTURE OF SACRUM, UNSPECIFIED PORTION OF SACRUM, INITIAL ENCOUNTER (H): ICD-10-CM

## 2018-07-10 DIAGNOSIS — S30.0XXA CONTUSION OF LOWER BACK, INITIAL ENCOUNTER: ICD-10-CM

## 2018-07-10 PROCEDURE — 25000132 ZZH RX MED GY IP 250 OP 250 PS 637: Performed by: EMERGENCY MEDICINE

## 2018-07-10 PROCEDURE — 72220 X-RAY EXAM SACRUM TAILBONE: CPT

## 2018-07-10 PROCEDURE — 72100 X-RAY EXAM L-S SPINE 2/3 VWS: CPT

## 2018-07-10 PROCEDURE — 99284 EMERGENCY DEPT VISIT MOD MDM: CPT

## 2018-07-10 RX ORDER — ACETAMINOPHEN 325 MG/1
650 TABLET ORAL ONCE
Status: COMPLETED | OUTPATIENT
Start: 2018-07-10 | End: 2018-07-10

## 2018-07-10 RX ADMIN — ACETAMINOPHEN 650 MG: 325 TABLET, FILM COATED ORAL at 22:54

## 2018-07-10 ASSESSMENT — ENCOUNTER SYMPTOMS
ARTHRALGIAS: 1
BACK PAIN: 1
HEADACHES: 0
CONSTIPATION: 0
DIZZINESS: 0
NUMBNESS: 0

## 2018-07-10 NOTE — ED AVS SNAPSHOT
[facility] Emergency Department    [address] [facility] [address]    Phone:  [phone]    Fax:  [phone]                                       Bhavani Calderon   MRN: [id number]    Department:  [facility] Emergency Department   Date of Visit:  7/10/2018           Patient Information     Date Of Birth          1965        Your diagnoses for this visit were:     Contusion of lower back, initial encounter     Strain of right Achilles tendon, initial encounter     Closed fracture of sacrum, unspecified portion of sacrum, initial encounter (H)        You were seen by Melanie Garcia MD.      Follow-up Information     [facility]    Why:  3-5 days as needed    Contact information:    [address]  [address]  [facility] [id number] [phone]        Follow up with [facility] Emergency Department.    Specialty:  EMERGENCY MEDICINE    Why:  As needed, If symptoms worsen    Contact information:    [facility] [address] [phone]        Discharge Instructions       You may have a strain or contusion of your Achilles tendon. Wear your boot for comfort. Follow-up with orthopedics in 5-7 days as needed.     Discharge References/Attachments     PELVIC FRACTURE (ENGLISH)    BACK CONTUSION (Persian)      Your next 10 appointments already scheduled     Jul 12, 2018 12:45 PM CDT   SHORT with Manny Martínez MD   [facility] ([facility])    [address] [place] [address] [phone]              24 Hour Appointment Hotline       To make an appointment at any [facility], call [phone] ([phone]). If you don't have a family doctor or clinic, we will help you find one. [facility] are conveniently located to serve the needs of you and your family.             Review of your medicines       Our records show that you are taking the medicines listed below. If these are incorrect, please call your family doctor or clinic.        Dose / Directions Last dose taken    fluticasone 50 MCG/ACT spray   Commonly known as:  FLONASE   Quantity:  1 Bottle        SPRAY 1 TO 2 SPRAYS INTO BOTH NOSTRILS DAILY.   Refills:  0        levothyroxine 75 MCG tablet   Commonly known as:  SYNTHROID/LEVOTHROID   Quantity:  7 tablet        TAKE ONE TABLET BY MOUTH ONE TIME DAILY. NEED AN APPOINTMENT FOR MORE REFILLS.   Refills:  0                Procedures and tests performed during your visit     Lumbar spine XR, 2-3 views    XR Sacrum and Coccyx 2 Views      Orders Needing Specimen Collection     None      Pending Results     Date and Time Order Name Status Description    7/10/2018 2239 Lumbar spine XR, 2-3 views Preliminary     7/10/2018 2239 XR Sacrum and Coccyx 2 Views Preliminary             Pending Culture Results     No orders found from 7/8/2018 to 7/11/2018.            Pending Results Instructions     If you had any lab results that were not finalized at the time of your Discharge, you can call the ED Lab Result RN at [phone]. You will be contacted by this team for any positive Lab results or changes in treatment. The nurses are available 7 days a week from 10A to 6:30P.  You can leave a message 24 hours per day and they will return your call.        Test Results From Your Hospital Stay        7/10/2018 11:40 PM      Narrative     XR SACRUM AND COCCYX 2 VW  7/10/2018 11:12 PM      HISTORY: Trauma, pain.     COMPARISON: None.        Impression     IMPRESSION: On the lateral view there is suggestion of a nondisplaced  fracture of the midsacrum.         7/10/2018 11:40 PM      Narrative     XR LUMBAR SPINE 2-3 VIEWS  7/10/2018 11:12 PM     HISTORY: Trauma, pain.    COMPARISON: None.         Impression     IMPRESSION: No acute fracture or malalignment. Mild multilevel  degenerative disease.                Clinical  Quality Measure: Blood Pressure Screening     Your blood pressure was checked while you were in the emergency department today. The last reading we obtained was  BP: 123/57 . Please read the guidelines below about what these numbers mean and what you should do about them.  If your systolic blood pressure (the top number) is less than 120 and your diastolic blood pressure (the bottom number) is less than 80, then your blood pressure is normal. There is nothing more that you need to do about it.  If your systolic blood pressure (the top number) is 120-139 or your diastolic blood pressure (the bottom number) is 80-89, your blood pressure may be higher than it should be. You should have your blood pressure rechecked within a year by a primary care provider.  If your systolic blood pressure (the top number) is 140 or greater or your diastolic blood pressure (the bottom number) is 90 or greater, you may have high blood pressure. High blood pressure is treatable, but if left untreated over time it can put you at risk for heart attack, stroke, or kidney failure. You should have your blood pressure rechecked by a primary care provider within the next 4 weeks.  If your provider in the emergency department today gave you specific instructions to follow-up with your doctor or provider even sooner than that, you should follow that instruction and not wait for up to 4 weeks for your follow-up visit.        Thank you for choosing [facility]       Thank you for choosing [facility] for your care. Our goal is always to provide you with excellent care. Hearing back from our patients is one way we can continue to improve our services. Please take a few minutes to complete the written survey that you may receive in the mail after you visit with us. Thank you!        [facility] Information     [facility] gives you secure access to your electronic health record. If you see a primary care provider, you can also send messages to your care team and  make appointments. If you have questions, please call your primary care clinic.  If you do not have a primary care provider, please call [phone] and they will assist you.        Care EveryWhere ID     This is your Care EveryWhere ID. This could be used by other organizations to access your [place] medical records  [id number]        Equal Access to Services     ALYSHA LOZADA : Linnette Watson, tyrone rodas, ramin tinajero, emeterio woodson . So [facility] [phone].    ATENCIÓN: Si habla español, tiene a mitchell disposición servicios gratuitos de asistencia lingüística. Llame al [phone].    We comply with applicable federal civil rights laws and [place] laws. We do not discriminate on the basis of race, color, national origin, age, disability, sex, sexual orientation, or gender identity.            After Visit Summary       This is your record. Keep this with you and show to your community pharmacist(s) and doctor(s) at your next visit.

## 2018-07-10 NOTE — LETTER
[facility] EMERGENCY DEPARTMENT  [address]  [facility] [address]  Phone: [phone]  Fax: [phone]    July 10, 2018        Bhavani Calderon  [address] [facility] [address]          To whom it may concern:    RE: Bhavani Calderon    Patient was seen and treated today at our emergency room. Please excuse her from work until 7/16/18 for medical reasons.    Please contact me for questions or concerns.      Sincerely,        Melanie Garcia MD

## 2018-07-10 NOTE — ED AVS SNAPSHOT
[facility] Emergency Department    [address]    [facility] [address]    Phone:  [phone]    Fax:  [phone]                                       Bhavani Calderon   MRN: [id number]    Department:  [facility] Emergency Department   Date of Visit:  7/10/2018           After Visit Summary Signature Page     I have received my discharge instructions, and my questions have been answered. I have discussed any challenges I see with this plan with the nurse or doctor.    ..........................................................................................................................................  Patient/Patient Representative Signature      ..........................................................................................................................................  Patient Representative Print Name and Relationship to Patient    ..................................................               ................................................  Date                                            Time    ..........................................................................................................................................  Reviewed by Signature/Title    ...................................................              ..............................................  Date                                                            Time

## 2018-07-11 NOTE — DISCHARGE INSTRUCTIONS
You may have a strain or contusion of your Achilles tendon. Wear your boot for comfort. Follow-up with orthopedics in 5-7 days as needed.

## 2018-07-11 NOTE — ED TRIAGE NOTES
Pt arrives to the ED for lower back pain. Pt states she fell on her steps going backwards and hit her tailbone on the step around 1500. Pt took ibuprofen last at 1600 with some mild relief. Denies any issues with bowel or bladder or numbness/tingling in legs. CMS intact.

## 2018-07-11 NOTE — ED NOTES
Pt provided with discharge paperwork and educated on recommended follow-up with orthopedics. Pt educated on how to wear ortho boot on right foot for achilles tendon along with how to manage pain appropriately at home. Pt voiced understanding and denied any questions at discharge. Pt brought to lobby in wheelchair.

## 2018-07-17 ENCOUNTER — TRANSFERRED RECORDS (OUTPATIENT)
Dept: HEALTH INFORMATION MANAGEMENT | Facility: CLINIC | Age: 53
End: 2018-07-17

## 2018-07-20 ENCOUNTER — OFFICE VISIT (OUTPATIENT)
Dept: FAMILY MEDICINE | Facility: CLINIC | Age: 53
End: 2018-07-20
Payer: COMMERCIAL

## 2018-07-20 VITALS
SYSTOLIC BLOOD PRESSURE: 122 MMHG | TEMPERATURE: 98.2 F | BODY MASS INDEX: 22.58 KG/M2 | WEIGHT: 115 LBS | HEART RATE: 46 BPM | HEIGHT: 60 IN | RESPIRATION RATE: 16 BRPM | OXYGEN SATURATION: 98 % | DIASTOLIC BLOOD PRESSURE: 72 MMHG

## 2018-07-20 DIAGNOSIS — M54.41 ACUTE RIGHT-SIDED LOW BACK PAIN WITH RIGHT-SIDED SCIATICA: ICD-10-CM

## 2018-07-20 DIAGNOSIS — J30.2 CHRONIC SEASONAL ALLERGIC RHINITIS, UNSPECIFIED TRIGGER: ICD-10-CM

## 2018-07-20 DIAGNOSIS — E06.3 HYPOTHYROIDISM DUE TO HASHIMOTO'S THYROIDITIS: Primary | ICD-10-CM

## 2018-07-20 PROCEDURE — 36415 COLL VENOUS BLD VENIPUNCTURE: CPT | Performed by: FAMILY MEDICINE

## 2018-07-20 PROCEDURE — 99214 OFFICE O/P EST MOD 30 MIN: CPT | Performed by: FAMILY MEDICINE

## 2018-07-20 PROCEDURE — 84443 ASSAY THYROID STIM HORMONE: CPT | Performed by: FAMILY MEDICINE

## 2018-07-20 RX ORDER — LEVOTHYROXINE SODIUM 75 UG/1
TABLET ORAL
Qty: 90 TABLET | Refills: 3 | Status: SHIPPED | OUTPATIENT
Start: 2018-07-20 | End: 2018-07-23

## 2018-07-20 RX ORDER — PREDNISONE 20 MG/1
40 TABLET ORAL DAILY
Qty: 10 TABLET | Refills: 0 | Status: SHIPPED | OUTPATIENT
Start: 2018-07-20 | End: 2019-02-01

## 2018-07-20 RX ORDER — FLUTICASONE PROPIONATE 50 MCG
2 SPRAY, SUSPENSION (ML) NASAL DAILY
Qty: 1 BOTTLE | Refills: 3 | Status: SHIPPED | OUTPATIENT
Start: 2018-07-20 | End: 2019-02-01

## 2018-07-20 NOTE — MR AVS SNAPSHOT
After Visit Summary   7/20/2018    Bhavani Calderon    MRN: [id number]           Patient Information     Date Of Birth          1965        Visit Information        Provider Department      7/20/2018 9:30 AM Manny Martínez MD [facility]        Today's Diagnoses     Hypothyroidism due to Hashimoto's thyroiditis    -  1    Chronic seasonal allergic rhinitis, unspecified trigger        Acute right-sided low back pain with right-sided sciatica           Follow-ups after your visit        Follow-up notes from your care team     Return in about 2 weeks (around 8/3/2018).      Future tests that were ordered for you today     Open Future Orders        Priority Expected Expires Ordered    MR Lumbar Spine w/o Contrast Routine  7/20/2019 7/20/2018            Who to contact     If you have questions or need follow up information about today's clinic visit or your schedule please contact [facility] directly at [phone].  Normal or non-critical lab and imaging results will be communicated to you by MyChart, letter or phone within 4 business days after the clinic has received the results. If you do not hear from us within 7 days, please contact the clinic through [facility] or phone. If you have a critical or abnormal lab result, we will notify you by phone as soon as possible.  Submit refill requests through [employer] or call your pharmacy and they will forward the refill request to us. Please allow 3 business days for your refill to be completed.          Additional Information About Your Visit        MyChart Information     [employer] gives you secure access to your electronic health record. If you see a primary care provider, you can also send messages to your care team and make appointments. If you have questions, please call your primary care clinic.  If you do not have a primary care provider, please call [phone] and they will assist you.        Care EveryWhere ID      This is your Care EveryWhere ID. This could be used by other organizations to access your [place] medical records  [id number]        Your Vitals Were     Pulse Temperature Respirations Height Last Period Pulse Oximetry    46 98.2  F (36.8  C) (Oral) 16 5' (1.524 m) 03/31/2006 98%    BMI (Body Mass Index)                   22.46 kg/m2            Blood Pressure from Last 3 Encounters:   07/20/18 122/72   07/10/18 123/57   04/07/17 127/81    Weight from Last 3 Encounters:   07/20/18 115 lb (52.2 kg)   07/10/18 113 lb (51.3 kg)   03/17/17 111 lb (50.3 kg)              We Performed the Following     TSH          Today's Medication Changes          These changes are accurate as of 7/20/18 10:06 AM.  If you have any questions, ask your nurse or doctor.               Start taking these medicines.        Dose/Directions    predniSONE 20 MG tablet   Commonly known as:  DELTASONE   Used for:  Acute right-sided low back pain with right-sided sciatica   Started by:  Manny Martínez MD        Dose:  40 mg   Take 2 tablets (40 mg) by mouth daily for 5 days   Quantity:  10 tablet   Refills:  0         These medicines have changed or have updated prescriptions.        Dose/Directions    fluticasone 50 MCG/ACT spray   Commonly known as:  FLONASE   This may have changed:  See the new instructions.   Used for:  Chronic seasonal allergic rhinitis, unspecified trigger   Changed by:  Manny Marítnez MD        Dose:  2 spray   Spray 2 sprays into both nostrils daily   Quantity:  1 Bottle   Refills:  3       levothyroxine 75 MCG tablet   Commonly known as:  SYNTHROID/LEVOTHROID   This may have changed:  See the new instructions.   Used for:  Hypothyroidism due to Hashimoto's thyroiditis   Changed by:  Manny Martínez MD        Take one pill once daily.   Quantity:  90 tablet   Refills:  3            Where to get your medicines      These medications were sent to [id number] - [place], [place] - [address]  [id number] [place] [address]  [place] [address]    Hours:  9Am-9Pm (M-F) 9Am-6Pm (S&S) Phone:  [phone]     fluticasone 50 MCG/ACT spray    levothyroxine 75 MCG tablet    predniSONE 20 MG tablet                Primary Care Provider Office Phone # Fax #    Manny Martínez [phone] [address]  [facility] [address]        Equal Access to Services     ALYSHA LOZADA : Hadii fransisco ku hadasho Soomaali, waaxda luqadaha, qaybta kaalmada adeegyada, waxmalathi malin haykathleenn adeilana chin chintan . So [facility] [phone].    ATENCIÓN: Si juan cosby, tiene a mitchell disposición servicios gratuitos de asistencia lingüística. [facility] [phone].    We comply with applicable federal civil rights laws and [place] laws. We do not discriminate on the basis of race, color, national origin, age, disability, sex, sexual orientation, or gender identity.            Thank you!     Thank you for choosing [facility]  for your care. Our goal is always to provide you with excellent care. Hearing back from our patients is one way we can continue to improve our services. Please take a few minutes to complete the written survey that you may receive in the mail after your visit with us. Thank you!             Your Updated Medication List - Protect others around you: Learn how to safely use, store and throw away your medicines at www.disposemymeds.org.          This list is accurate as of 7/20/18 10:06 AM.  Always use your most recent med list.                   Brand Name Dispense Instructions for use Diagnosis    fluticasone 50 MCG/ACT spray    FLONASE    1 Bottle    Spray 2 sprays into both nostrils daily    Chronic seasonal allergic rhinitis, unspecified trigger       levothyroxine 75 MCG tablet    SYNTHROID/LEVOTHROID    90 tablet    Take one pill once daily.    Hypothyroidism due to Hashimoto's thyroiditis       predniSONE 20 MG tablet    DELTASONE    10 tablet    Take 2 tablets (40 mg) by mouth daily for 5 days    Acute right-sided low  back pain with right-sided sciatica

## 2018-07-20 NOTE — PROGRESS NOTES
SUBJECTIVE:   Bhavani Calderon is a 52 year old female who presents to clinic today for the following health issues:      Back Pain       Duration: 10 days ago        Specific cause: fall     Description:   Location of pain: low back center and middle of back center  Character of pain: sharp and constant  Pain radiation:none  New numbness or weakness in legs, not attributed to pain:  no     Intensity: Currently 7/10, At its worst 10/10, moderate, severe    History:   Pain interferes with job: YES  History of back problems: no prior back problems  Any previous MRI or X-rays: yes, pt went to ER and had Xray of the lower back and sacrum.  Sees a specialist for back pain:  No  Therapies tried without relief: none    Alleviating factors:   Improved by: acetaminophen (Tylenol) and cold      Precipitating factors:  Worsened by: Lifting, Bending, Sitting and Lying Flat    Functional and Psychosocial Screen (Cole STarT Back):      Not performed today          Accompanying Signs & Symptoms:  Risk of Fracture:  Pt did have sacral fracture  Risk of Cauda Equina:  None  Risk of Infection:  None  Risk of Cancer:  None  Risk of Ankylosing Spondylitis:  Onset at age <35, male, AND morning back stiffness. no         Pt went to ER for evaluation for the fall, and had an Xray of the sacrum  That showed small no displaced fracture of the sacrum.  Went to orthopedic and was told to treat conservatively.  But her pain is getting worse, and now there is pain in the rt buttock and difficulty walking on her the Rt foot             Problem list and histories reviewed & adjusted, as indicated.  Additional history: as documented    Patient Active Problem List   Diagnosis     CARDIOVASCULAR SCREENING; LDL GOAL LESS THAN 100     Seasonal allergic rhinitis     Rotator cuff syndrome     Pain in joint, shoulder region     Hypothyroidism due to Hashimoto's thyroiditis     Past Surgical History:   Procedure Laterality Date     COLONOSCOPY N/A  4/7/2017    Procedure: COLONOSCOPY;  Surgeon: Christos Pena MD;  Location:  GI       Social History   Substance Use Topics     Smoking status: Never Smoker     Smokeless tobacco: Never Used     Alcohol use No     Family History   Problem Relation Age of Onset     GASTROINTESTINAL DISEASE Father      hepatitis, unclear etiology, may be autoimmune     GASTROINTESTINAL DISEASE Brother      hepatitis, unclear etiology     Diabetes Other            Reviewed and updated as needed this visit by clinical staff  Tobacco  Allergies  Meds  Med Hx  Surg Hx  Fam Hx  Soc Hx      Reviewed and updated as needed this visit by Provider         ROS:  CONSTITUTIONAL: NEGATIVE for fever, chills, change in weight  NEURO: NEGATIVE for weakness, dizziness or paresthesias    OBJECTIVE:     /72 (BP Location: Right arm, Patient Position: Chair, Cuff Size: Adult Regular)  Pulse (!) 46  Temp 98.2  F (36.8  C) (Oral)  Resp 16  Ht 5' (1.524 m)  Wt 115 lb (52.2 kg)  LMP 03/31/2006  SpO2 98%  BMI 22.46 kg/m2  Body mass index is 22.46 kg/(m^2).  Patient appears to be in moderate pain, positie antalgic gait noted.   Lumbosacral spine area reveals positive Rt  local tenderness but no mass.    ROM of the LS spine showed unable due to pain .extension unable  flexionunalbe   Straight leg raise is positive at 30 degrees on right.  L4 :toe walk hard to walk patellar reflux nl medial foot sensation decreased on the Rt side.  L5: dorsiflexion of the big toe nl,mid foot sensation nl  S1: heel walk hard to walk, [place] reflex not done, lateral sensation of the foot  nl     Peripheral pulses are palpable.           ASSESSMENT/PLAN:             1. Hypothyroidism due to Hashimoto's thyroiditis  Check TSH, and refill her thyroid medications.  - levothyroxine (SYNTHROID/LEVOTHROID) 75 MCG tablet; Take one pill once daily.  Dispense: 90 tablet; Refill: 3  - TSH    2. Chronic seasonal allergic rhinitis, unspecified trigger    - fluticasone  (FLONASE) 50 MCG/ACT spray; Spray 2 sprays into both nostrils daily  Dispense: 1 Bottle; Refill: 3    3. Acute right-sided low back pain with right-sided sciatica, with mld weakness on the Rt foot on exam.  Mostly sciatica, will start a course of prednisone, and check MR of the back.  - predniSONE (DELTASONE) 20 MG tablet; Take 2 tablets (40 mg) by mouth daily for 5 days  Dispense: 10 tablet; Refill: 0  - MR Lumbar Spine w/o Contrast; Future    FUTURE APPOINTMENTS:       - Follow-up visit in 1 to 2 weeks.     Manny Martínez MD  [facility]

## 2018-07-21 LAB — TSH SERPL DL<=0.005 MIU/L-ACNC: 24.73 MU/L (ref 0.4–4)

## 2018-07-23 ENCOUNTER — TELEPHONE (OUTPATIENT)
Dept: FAMILY MEDICINE | Facility: CLINIC | Age: 53
End: 2018-07-23

## 2018-07-23 DIAGNOSIS — E06.3 HYPOTHYROIDISM DUE TO HASHIMOTO'S THYROIDITIS: ICD-10-CM

## 2018-07-23 RX ORDER — LEVOTHYROXINE SODIUM 100 UG/1
100 TABLET ORAL DAILY
Qty: 90 TABLET | Refills: 1 | Status: SHIPPED | OUTPATIENT
Start: 2018-07-23 | End: 2019-02-01

## 2018-07-23 NOTE — TELEPHONE ENCOUNTER
Please call Annette, her labs are showing low thyroid, I would like to increase her thyroid medicine to 100 mcg daily, (instead of 75 mcg) and want her to do lab only blood test in 3 months (october) to recheck her thyroid level.  Manny Martínez MD  [facility]  [phone]

## 2018-07-24 ENCOUNTER — HOSPITAL ENCOUNTER (OUTPATIENT)
Dept: MRI IMAGING | Facility: CLINIC | Age: 53
Discharge: HOME | End: 2018-07-24
Attending: FAMILY MEDICINE | Admitting: FAMILY MEDICINE
Payer: COMMERCIAL

## 2018-07-24 DIAGNOSIS — M54.41 ACUTE RIGHT-SIDED LOW BACK PAIN WITH RIGHT-SIDED SCIATICA: ICD-10-CM

## 2018-07-24 PROCEDURE — 72148 MRI LUMBAR SPINE W/O DYE: CPT

## 2018-07-25 ENCOUNTER — TELEPHONE (OUTPATIENT)
Dept: FAMILY MEDICINE | Facility: CLINIC | Age: 53
End: 2018-07-25

## 2018-07-25 DIAGNOSIS — S22.080A COMPRESSION FRACTURE OF T12 VERTEBRA (H): ICD-10-CM

## 2018-07-25 DIAGNOSIS — S32.010A CLOSED COMPRESSION FRACTURE OF FIRST LUMBAR VERTEBRA, INITIAL ENCOUNTER: Primary | ICD-10-CM

## 2018-07-25 RX ORDER — CALCITONIN SALMON 200 [IU]/.09ML
1 SPRAY, METERED NASAL DAILY
Qty: 1 BOTTLE | Refills: 0 | Status: SHIPPED | OUTPATIENT
Start: 2018-07-25 | End: 2019-02-01

## 2018-07-25 NOTE — TELEPHONE ENCOUNTER
Patient informed. Expressed understanding.    Dexa Scan ordered per written below.    Gabriela AMAYA RN, BSN, PHN  [facility] RN

## 2018-07-25 NOTE — TELEPHONE ENCOUNTER
Please call patient, her MRI Is showing fracture of the T12 and L1 (mid lower part of her back) that is mostly related to her fall.  But given her age, and the fact that she has 2 fractures, I wonder if her bones are slightly fragile (osteoporosis)  2 things :  One : I would like her to see Orthopedic for that matter soon, I put the order in and they will call her for an appointment.  Two : I will start her on nasal spray to take one spray daily.  Three : I will order Dexa scan for her bones, please make sure she schedule one to evaluate how strong her bones are.

## 2018-08-02 ENCOUNTER — RADIANT APPOINTMENT (OUTPATIENT)
Dept: BONE DENSITY | Facility: CLINIC | Age: 53
End: 2018-08-02
Payer: COMMERCIAL

## 2018-08-02 DIAGNOSIS — S22.080A COMPRESSION FRACTURE OF T12 VERTEBRA (H): ICD-10-CM

## 2018-08-02 DIAGNOSIS — S32.010A CLOSED COMPRESSION FRACTURE OF FIRST LUMBAR VERTEBRA, INITIAL ENCOUNTER: ICD-10-CM

## 2018-08-02 PROCEDURE — 77085 DXA BONE DENSITY AXL VRT FX: CPT | Performed by: FAMILY MEDICINE

## 2018-08-06 ENCOUNTER — OFFICE VISIT (OUTPATIENT)
Dept: NEUROSURGERY | Facility: CLINIC | Age: 53
End: 2018-08-06
Attending: NURSE PRACTITIONER
Payer: COMMERCIAL

## 2018-08-06 VITALS
OXYGEN SATURATION: 96 % | WEIGHT: 114.4 LBS | HEART RATE: 59 BPM | BODY MASS INDEX: 24.01 KG/M2 | SYSTOLIC BLOOD PRESSURE: 107 MMHG | HEIGHT: 58 IN | DIASTOLIC BLOOD PRESSURE: 62 MMHG

## 2018-08-06 DIAGNOSIS — S22.080A T12 COMPRESSION FRACTURE (H): ICD-10-CM

## 2018-08-06 DIAGNOSIS — S32.018A OTHER CLOSED FRACTURE OF FIRST LUMBAR VERTEBRA, INITIAL ENCOUNTER (H): Primary | ICD-10-CM

## 2018-08-06 PROCEDURE — G0463 HOSPITAL OUTPT CLINIC VISIT: HCPCS

## 2018-08-06 PROCEDURE — 99204 OFFICE O/P NEW MOD 45 MIN: CPT | Performed by: NURSE PRACTITIONER

## 2018-08-06 RX ORDER — TRAMADOL HYDROCHLORIDE 50 MG/1
50 TABLET ORAL EVERY 6 HOURS PRN
Qty: 40 TABLET | Refills: 0 | Status: SHIPPED | OUTPATIENT
Start: 2018-08-06 | End: 2019-02-01

## 2018-08-06 ASSESSMENT — PAIN SCALES - GENERAL: PAINLEVEL: WORST PAIN (10)

## 2018-08-06 NOTE — PROGRESS NOTES
"Dr. Pedro Nye  [facility]  Neurosurgery Clinic Visit        CC: Low back pain; L1 compression fracture    Primary care Provider: Manny Martínez      Reason For Visit:   I was asked by Dr. Martínez to consult on the patient for closed L1 compression fracture.      HPI: Bhavani Calderon is a 52 year old female with mid to lower back pain.  She suffered a fall on 7/9/18 where she describes falling down 2-3 steps and hitting her back as she fell.  She was seen on 7/10/18 and Xrays showed a nondisplaced sacral fracture, but no acute findings on lumbar Xrays.  She was seen by ortho and recommendation was to treat conservatively.  She states the pain continued to bother her, specifically her low back pain.  She had a lumbar MRI on 7/24/18 which showed T12 and L1 compression fractures.  She states the pain is a constant dull to sharp pain to the lower back and it radiates upwards.  The pain increases with activities including prolonged standing, walking and any bending or reaching.  She has found little to provide relief other then sitting and lying down.  She denies shooting pain into the BLE but describe an intermittent \"pressure\" to the groin.  She denies falls or weakness to BLE.  She denies foot drop.      Pain right now: 9    Past Medical History:   Diagnosis Date     Acquired hypothyroidism 1/21/2016     Grave's disease     High TSI titer, s/p I-131May 2009     Hypothyroidism due to Hashimoto's thyroiditis 3/21/2017     Menopausal state      Rotator cuff syndrome 7/16/2014     Seasonal allergic rhinitis 4/11/2014       Past Medical History reviewed with patient during visit.    Past Surgical History:   Procedure Laterality Date     COLONOSCOPY N/A 4/7/2017    Procedure: COLONOSCOPY;  Surgeon: Christos Pena MD;  Location:  GI     Past Surgical History reviewed with patient during visit.    Current Outpatient Prescriptions   Medication     calcitonin, salmon, (MIACALCIN) 200 UNIT/ACT nasal spray "     fluticasone (FLONASE) 50 MCG/ACT spray     levothyroxine (SYNTHROID/LEVOTHROID) 100 MCG tablet     No current facility-administered medications for this visit.        Allergies   Allergen Reactions     Penicillins        Social History     Social History     Marital status:      Spouse name: N/A     Number of children: N/A     Years of education: N/A     Social History Main Topics     Smoking status: Never Smoker     Smokeless tobacco: Never Used     Alcohol use No     Drug use: No     Sexual activity: Not Currently     Other Topics Concern     Not on file     Social History Narrative       Family History   Problem Relation Age of Onset     GASTROINTESTINAL DISEASE Father      hepatitis, unclear etiology, may be autoimmune     GASTROINTESTINAL DISEASE Brother      hepatitis, unclear etiology     Diabetes Other          ROS: 10 point ROS neg other than the symptoms noted above in the HPI.    Vital Signs: LMP 03/31/2006    Examination:  Constitutional:  Alert, well nourished, NAD.  HEENT: Normocephalic, atraumatic.   Pulm:  Without shortness of breath   CV:  No pitting edema of BLE.    Neurological:  Awake  Alert  Oriented x 3  Speech clear  Cranial nerves II - XII intact    Motor exam   Shoulder Abduction:  Right:  5/5   Left:  5/5  Biceps:                      Right:  5/5   Left:  5/5  Triceps:                     Right:  5/5   Left:  5/5  Wrist Extensors:       Right:  5/5   Left:  5/5  Wrist Flexors:           Right:  5/5   Left:  5/5  Intrinsics:                   Right:  5/5   Left:  5/5  Hip Flexor:                Right: 5/5  Left:  5/5  Hip Adductor:             Right:  5/5  Left:  5/5  Hip Abductor:             Right:  5/5  Left:  5/5  Gastroc Soleus:        Right:  5/5  Left:  5/5  Tib/Ant:                      Right:  5/5  Left:  5/5  EHL:                          Right:  5/5  Left:  5/5   Sensation normal to bilateral upper and lower extremities  Clonus negative  Gait: Able to stand from a  seated position. Normal non-antalgic, non-myelopathic gait.  Able to heel/toe walk without loss of balance  Cervical examination reveals good range of motion.  No tenderness to palpation of the cervical spine or paraspinous muscles bilaterally.    Thoracic examination reveals tenderness to palpation of spine midline and paraspinous muscles at lower thoracic spine.    Lumbar examination reveals tenderness of the spine and paraspinous muscles, most notable at upper lumbar spine.  Hip height is symmetrical. Negative SI joint, sciatic notch or greater trochanteric tenderness to palpation bilaterally.  Straight leg raise is negative bilaterally.  ROM limited due to pain.    Imaging:   IMPRESSION:   1. Acute/subacute incomplete compression fractures without wedge deformity at the anterior-inferior aspects of T12 and L1.  2. Prominent sacral nerve root sheath cysts as described above.    3. Multilevel early degenerative disc disease with no acute disc  protrusion or significant central or foraminal stenosis.    Assessment/Plan:   T12 compression fracture  L1 compression fracture    Bhavani Calderon is a 52 year old female with mid to lower back pain.  She suffered a fall on 7/9/18 where she describes falling down 2-3 steps and hitting her back as she fell.  She was seen on 7/10/18 and Xrays showed a nondisplaced sacral fracture, but no acute findings on lumbar Xrays.  She was seen by ortho and recommendation was to treat conservatively.  She states the pain continued to bother her, specifically her low back pain.  She had a lumbar MRI on 7/24/18 which showed T12 and L1 compression fractures.  She states the pain is a constant dull to sharp pain to the lower back and it radiates upwards.  The pain increases with activities including prolonged standing, walking and any bending or reaching.  She has found little to provide relief other then sitting and lying down.  She denies shooting pain into the BLE but describe an  "intermittent \"pressure\" to the groin.  She denies falls or weakness to BLE.  She denies foot drop.  We reviewed MRI findings and discussed bracing and/or consideration of kyphoplasty.  The patient is not interested in kyphoplasty, and agrees to TLSO when out of bed, minimum of 12 weeks.  We discussed restrictions and she states she needs to continue to work, but will follow restrictions of no lifting >10 pounds and no repetitive bending or reaching.  A work letter was written.  She was given a prescription for Tramadol for prn pain and verbalized understanding of correct use.  She will return in 4-5 weeks for repeat Xrays.  Fitting for TLSO today.  She will contact us if pain increases, weakness or pain in the legs and/or further concerns.     Patient Instructions   -Tramadol as needed  -No lifting more than 10 pounds  -Brace when out of bed  -Return in 4-5 weeks with Xrays  -Please contact the clinic if pain persists at [phone].        Sugar Sanz [facility]  [facility]  [address] [address]    Tel [phone]  Pager [phone]  "

## 2018-08-06 NOTE — LETTER
"    8/6/2018         RE: Bhavani Calderon  [address] [facility] [address]        Dear Colleague,    Thank you for referring your patient, Bhavani Calderon, to the [facility]. Please see a copy of my visit note below.    Dr. Pedro Nye  [facility]  Neurosurgery Clinic Visit        CC: Low back pain; L1 compression fracture    Primary care Provider: Manny Martínez      Reason For Visit:   I was asked by Dr. Martínez to consult on the patient for closed L1 compression fracture.      HPI: Bhavani Calderon is a 52 year old female with mid to lower back pain.  She suffered a fall on 7/9/18 where she describes falling down 2-3 steps and hitting her back as she fell.  She was seen on 7/10/18 and Xrays showed a nondisplaced sacral fracture, but no acute findings on lumbar Xrays.  She was seen by ortho and recommendation was to treat conservatively.  She states the pain continued to bother her, specifically her low back pain.  She had a lumbar MRI on 7/24/18 which showed T12 and L1 compression fractures.  She states the pain is a constant dull to sharp pain to the lower back and it radiates upwards.  The pain increases with activities including prolonged standing, walking and any bending or reaching.  She has found little to provide relief other then sitting and lying down.  She denies shooting pain into the BLE but describe an intermittent \"pressure\" to the groin.  She denies falls or weakness to BLE.  She denies foot drop.      Pain right now: 9    Past Medical History:   Diagnosis Date     Acquired hypothyroidism 1/21/2016     Grave's disease     High TSI titer, s/p I-131May 2009     Hypothyroidism due to Hashimoto's thyroiditis 3/21/2017     Menopausal state      Rotator cuff syndrome 7/16/2014     Seasonal allergic rhinitis 4/11/2014       Past Medical History reviewed with patient during visit.    Past Surgical History:   Procedure Laterality Date     COLONOSCOPY N/A " 4/7/2017    Procedure: COLONOSCOPY;  Surgeon: Christos Pena MD;  Location:  GI     Past Surgical History reviewed with patient during visit.    Current Outpatient Prescriptions   Medication     calcitonin, salmon, (MIACALCIN) 200 UNIT/ACT nasal spray     fluticasone (FLONASE) 50 MCG/ACT spray     levothyroxine (SYNTHROID/LEVOTHROID) 100 MCG tablet     No current facility-administered medications for this visit.        Allergies   Allergen Reactions     Penicillins        Social History     Social History     Marital status:      Spouse name: N/A     Number of children: N/A     Years of education: N/A     Social History Main Topics     Smoking status: Never Smoker     Smokeless tobacco: Never Used     Alcohol use No     Drug use: No     Sexual activity: Not Currently     Other Topics Concern     Not on file     Social History Narrative       Family History   Problem Relation Age of Onset     GASTROINTESTINAL DISEASE Father      hepatitis, unclear etiology, may be autoimmune     GASTROINTESTINAL DISEASE Brother      hepatitis, unclear etiology     Diabetes Other          ROS: 10 point ROS neg other than the symptoms noted above in the HPI.    Vital Signs: LMP 03/31/2006    Examination:  Constitutional:  Alert, well nourished, NAD.  HEENT: Normocephalic, atraumatic.   Pulm:  Without shortness of breath   CV:  No pitting edema of BLE.    Neurological:  Awake  Alert  Oriented x 3  Speech clear  Cranial nerves II - XII intact    Motor exam   Shoulder Abduction:  Right:  5/5   Left:  5/5  Biceps:                      Right:  5/5   Left:  5/5  Triceps:                     Right:  5/5   Left:  5/5  Wrist Extensors:       Right:  5/5   Left:  5/5  Wrist Flexors:           Right:  5/5   Left:  5/5  Intrinsics:                   Right:  5/5   Left:  5/5  Hip Flexor:                Right: 5/5  Left:  5/5  Hip Adductor:             Right:  5/5  Left:  5/5  Hip Abductor:             Right:  5/5  Left:  5/5  Gastroc  Soleus:        Right:  5/5  Left:  5/5  Tib/Ant:                      Right:  5/5  Left:  5/5  EHL:                          Right:  5/5  Left:  5/5   Sensation normal to bilateral upper and lower extremities  Clonus negative  Gait: Able to stand from a seated position. Normal non-antalgic, non-myelopathic gait.  Able to heel/toe walk without loss of balance  Cervical examination reveals good range of motion.  No tenderness to palpation of the cervical spine or paraspinous muscles bilaterally.    Thoracic examination reveals tenderness to palpation of spine midline and paraspinous muscles at lower thoracic spine.    Lumbar examination reveals tenderness of the spine and paraspinous muscles, most notable at upper lumbar spine.  Hip height is symmetrical. Negative SI joint, sciatic notch or greater trochanteric tenderness to palpation bilaterally.  Straight leg raise is negative bilaterally.  ROM limited due to pain.    Imaging:   IMPRESSION:   1. Acute/subacute incomplete compression fractures without wedge deformity at the anterior-inferior aspects of T12 and L1.  2. Prominent sacral nerve root sheath cysts as described above.    3. Multilevel early degenerative disc disease with no acute disc  protrusion or significant central or foraminal stenosis.    Assessment/Plan:   T12 compression fracture  L1 compression fracture    Bhavani Calderon is a 52 year old female with mid to lower back pain.  She suffered a fall on 7/9/18 where she describes falling down 2-3 steps and hitting her back as she fell.  She was seen on 7/10/18 and Xrays showed a nondisplaced sacral fracture, but no acute findings on lumbar Xrays.  She was seen by ortho and recommendation was to treat conservatively.  She states the pain continued to bother her, specifically her low back pain.  She had a lumbar MRI on 7/24/18 which showed T12 and L1 compression fractures.  She states the pain is a constant dull to sharp pain to the lower back and it  "radiates upwards.  The pain increases with activities including prolonged standing, walking and any bending or reaching.  She has found little to provide relief other then sitting and lying down.  She denies shooting pain into the BLE but describe an intermittent \"pressure\" to the groin.  She denies falls or weakness to BLE.  She denies foot drop.  We reviewed MRI findings and discussed bracing and/or consideration of kyphoplasty.  The patient is not interested in kyphoplasty, and agrees to TLSO when out of bed, minimum of 12 weeks.  We discussed restrictions and she states she needs to continue to work, but will follow restrictions of no lifting >10 pounds and no repetitive bending or reaching.  A work letter was written.  She was given a prescription for Tramadol for prn pain and verbalized understanding of correct use.  She will return in 4-5 weeks for repeat Xrays.  Fitting for TLSO today.  She will contact us if pain increases, weakness or pain in the legs and/or further concerns.     Patient Instructions   -Tramadol as needed  -No lifting more than 10 pounds  -Brace when out of bed  -Return in 4-5 weeks with Xrays  -Please contact the clinic if pain persists at [phone].        Sugar Sanz [facility]  [facility]  [address] [address]    Tel [phone]  Pager [phone]    Again, thank you for allowing me to participate in the care of your patient.        Sincerely,        Sugar Sanz, NP    "

## 2018-08-06 NOTE — NURSING NOTE
"Bhavani Calderon is a 52 year old female who presents for:  Chief Complaint   Patient presents with     Neurologic Problem     Closed compression fracture of first lumbar vertebra, compression fracture of T12 vertebra.         Vitals:    Vitals:    08/06/18 1442   BP: 107/62   BP Location: Right arm   Patient Position: Sitting   Cuff Size: Adult Regular   Pulse: 59   SpO2: 96%   Weight: 114 lb 6.4 oz (51.9 kg)   Height: 4' 9.7\" (1.466 m)       BMI:  Estimated body mass index is 24.16 kg/(m^2) as calculated from the following:    Height as of this encounter: 4' 9.7\" (1.466 m).    Weight as of this encounter: 114 lb 6.4 oz (51.9 kg).    Pain Score:  Worst Pain (10)      Do you feel safe in your environment?  Yes      Ainsley Rodriguez"

## 2018-08-06 NOTE — LETTER
[facility]   [facility]  [address] [place] [address]  [id number]    August 6, 2018      To Whom it May Concern,          Bhavani Calderon was seen in clinic on 8/6/28.  She may work with the following restrictions:      Restrictions:    - No lifting greater than 10 pounds.  - No repetitive bending, twisting, or overhead reaching  - 5 minute hourly stretch breaks     - Will reassess at next appointment in 6 weeks.             Sincerely,                Sugar Sanz CNP  [facility]  [address] [address]    Tel [phone]

## 2018-08-06 NOTE — PATIENT INSTRUCTIONS
-Tramadol as needed  -No lifting more than 10 pounds  -Brace when out of bed  -Return in 4-5 weeks with Xrays  -Please contact the clinic if pain persists at [phone].

## 2018-08-06 NOTE — MR AVS SNAPSHOT
After Visit Summary   8/6/2018    Bhavani Calderon    MRN: [id number]           Patient Information     Date Of Birth          1965        Visit Information        Provider Department      8/6/2018 2:40 PM Sugar Sanz NP [facility]        Today's Diagnoses     Other closed fracture of first lumbar vertebra, initial encounter (H)    -  1    T12 compression fracture (H)          Care Instructions    -Tramadol as needed  -No lifting more than 10 pounds  -Brace when out of bed  -Return in 4-5 weeks with Xrays  -Please contact the clinic if pain persists at [phone].            Follow-ups after your visit        Additional Services     ORTHOTICS REFERRAL       **This referral order prints off in the [facility]  (Orthotics & Prosthetics) Central Scheduling Office**    The [facility] Central Scheduling Staff will contact the patient to schedule appointments.     Central Scheduling Contact Information: [phone] ([place])    Orthotics: TLSO ([facility])    Please be aware that coverage of these services is subject to the terms and limitations of your health insurance plan.  Call member services at your health plan with any benefit or coverage questions.      Please bring the following to your appointment:    >>   Any x-rays, CTs or MRIs which have been performed.  Contact the facility where they were done to arrange for  prior to your scheduled appointment.    >>   List of current medications   >>   This referral request   >>   Any documents/labs given to you for this referral                  Future tests that were ordered for you today     Open Future Orders        Priority Expected Expires Ordered    XR Lumbar Spine G/E 4 Views Routine 9/6/2018 8/6/2019 8/6/2018            Who to contact     If you have questions or need follow up information about today's clinic visit or your schedule please contact [facility]  [facility] directly at [phone].  Normal or non-critical lab and imaging results will be communicated to you by MyChart, letter or phone within 4 business days after the clinic has received the results. If you do not hear from us within 7 days, please contact the clinic through [employer] or phone. If you have a critical or abnormal lab result, we will notify you by phone as soon as possible.  Submit refill requests through MySQUAR or call your pharmacy and they will forward the refill request to us. Please allow 3 business days for your refill to be completed.          Additional Information About Your Visit        O[employer]amarac Information     MySQUAR gives you secure access to your electronic health record. If you see a primary care provider, you can also send messages to your care team and make appointments. If you have questions, please call your primary care clinic.  If you do not have a primary care provider, please call [phone] and they will assist you.        Care EveryWhere ID     This is your Care EveryWhere ID. This could be used by other organizations to access your [place] medical records  [id number]        Your Vitals Were     Pulse Height Last Period Pulse Oximetry BMI (Body Mass Index)       59 4' 9.7\" (1.466 m) 03/31/2006 96% 24.16 kg/m2        Blood Pressure from Last 3 Encounters:   08/06/18 107/62   07/20/18 122/72   07/10/18 123/57    Weight from Last 3 Encounters:   08/06/18 114 lb 6.4 oz (51.9 kg)   07/20/18 115 lb (52.2 kg)   07/10/18 113 lb (51.3 kg)              We Performed the Following     ORTHOTICS REFERRAL          Today's Medication Changes          These changes are accurate as of 8/6/18  3:02 PM.  If you have any questions, ask your nurse or doctor.               Start taking these medicines.        Dose/Directions    traMADol 50 MG tablet   Commonly known as:  ULTRAM   Used for:  Other closed fracture of first lumbar vertebra, initial encounter (H)   Started by:  " Sugar Sanz, CALVIN        Dose:  50 mg   Take 1 tablet (50 mg) by mouth every 6 hours as needed for severe pain   Quantity:  40 tablet   Refills:  0            Where to get your medicines      Some of these will need a paper prescription and others can be bought over the counter.  Ask your nurse if you have questions.     Bring a paper prescription for each of these medications     traMADol 50 MG tablet               Information about OPIOIDS     PRESCRIPTION OPIOIDS: WHAT YOU NEED TO KNOW   We gave you an opioid (narcotic) pain medicine. It is important to manage your pain, but opioids are not always the best choice. You should first try all the other options your care team gave you. Take this medicine for as short a time (and as few doses) as possible.     These medicines have risks:    DO NOT drive when on new or higher doses of pain medicine. These medicines can affect your alertness and reaction times, and you could be arrested for driving under the influence (DUI). If you need to use opioids long-term, talk to your care team about driving.    DO NOT operate heave machinery    DO NOT do any other dangerous activities while taking these medicines.     DO NOT drink any alcohol while taking these medicines.      If the opioid prescribed includes acetaminophen, DO NOT take with any other medicines that contain acetaminophen. Read all labels carefully. Look for the word  acetaminophen  or  Tylenol.  Ask your pharmacist if you have questions or are unsure.    You can get addicted to pain medicines, especially if you have a history of addiction (chemical, alcohol or substance dependence). Talk to your care team about ways to reduce this risk.    Store your pills in a secure place, locked if possible. We will not replace any lost or stolen medicine. If you don t finish your medicine, please throw away (dispose) as directed by your pharmacist. The [place] [employer]ution [employer] has more information about safe  disposal: https://www.pca.[facility].us/living-green/managing-unwanted-medications.     All opioids tend to cause constipation. Drink plenty of water and eat foods that have a lot of fiber, such as fruits, vegetables, prune juice, apple juice and high-fiber cereal. Take a laxative (Miralax, milk of magnesia, Colace, Senna) if you don t move your bowels at least every other day.          Primary Care Provider Office Phone # Fax #    Manny Martínez [phone] [address] [facility] [address]        Equal Access to Services     [facility] : Hadii fransisco Watson, waaxda adrianna, qaybta kaalmada tanvi, emeterio woodson . So [facility] [phone].    ATENCIÓN: Si habla español, tiene a mitchell disposición servicios gratuitos de asistencia lingüística. Llame al [phone].    We comply with applicable federal civil rights laws and [place] laws. We do not discriminate on the basis of race, color, national origin, age, disability, sex, sexual orientation, or gender identity.            Thank you!     Thank you for choosing [facility]  for your care. Our goal is always to provide you with excellent care. Hearing back from our patients is one way we can continue to improve our services. Please take a few minutes to complete the written survey that you may receive in the mail after your visit with us. Thank you!             Your Updated Medication List - Protect others around you: Learn how to safely use, store and throw away your medicines at www.disposemymeds.org.          This list is accurate as of 8/6/18  3:02 PM.  Always use your most recent med list.                   Brand Name Dispense Instructions for use Diagnosis    calcitonin (salmon) 200 UNIT/ACT nasal spray    MIACALCIN    1 Bottle    Spray 1 spray into one nostril alternating nostrils daily Alternate nostril each day.    Closed compression fracture of first lumbar vertebra, initial  encounter (H), Compression fracture of T12 vertebra (H)       fluticasone 50 MCG/ACT spray    FLONASE    1 Bottle    Spray 2 sprays into both nostrils daily    Chronic seasonal allergic rhinitis, unspecified trigger       levothyroxine 100 MCG tablet    SYNTHROID/LEVOTHROID    90 tablet    Take 1 tablet (100 mcg) by mouth daily    Hypothyroidism due to Hashimoto's thyroiditis       traMADol 50 MG tablet    ULTRAM    40 tablet    Take 1 tablet (50 mg) by mouth every 6 hours as needed for severe pain    Other closed fracture of first lumbar vertebra, initial encounter (H)

## 2018-08-13 ENCOUNTER — OFFICE VISIT (OUTPATIENT)
Dept: FAMILY MEDICINE | Facility: CLINIC | Age: 53
End: 2018-08-13
Payer: COMMERCIAL

## 2018-08-13 VITALS
OXYGEN SATURATION: 96 % | WEIGHT: 116 LBS | RESPIRATION RATE: 16 BRPM | SYSTOLIC BLOOD PRESSURE: 129 MMHG | TEMPERATURE: 98 F | BODY MASS INDEX: 24.35 KG/M2 | DIASTOLIC BLOOD PRESSURE: 72 MMHG | HEIGHT: 58 IN | HEART RATE: 50 BPM

## 2018-08-13 DIAGNOSIS — S22.080A COMPRESSION FRACTURE OF T12 VERTEBRA (H): Primary | ICD-10-CM

## 2018-08-13 PROCEDURE — 99214 OFFICE O/P EST MOD 30 MIN: CPT | Performed by: FAMILY MEDICINE

## 2018-08-13 NOTE — PROGRESS NOTES
SUBJECTIVE:   Bhavani Calderon is a 52 year old female who presents to clinic today for the following health issues:      Back Pain Follow Up      Description:   Location of pain:  Upper and center  Character of pain: sharp and constant  Pain radiation: pelvic area  Since last visit, pain is:  unchanged  New numbness or weakness in legs, not attributed to pain:  no     Intensity: Currently 8/10, At its worst 10/10, moderate, severe    History:   Pain interferes with job: Not applicable  Therapies tried without relief: none  Therapies tried with relief: rest           Accompanying Signs & Symptoms:  Risk of Fracture:  None, but pt did have fx of the lower back.  Risk of Cauda Equina:  None  Risk of Infection:  None  Risk of Cancer:  None        Amount of exercise or physical activity: None    Problems taking medications regularly: No    Medication side effects: none    Diet: regular (no restrictions)            Problem list and histories reviewed & adjusted, as indicated.  Additional history: as documented    Patient Active Problem List   Diagnosis     CARDIOVASCULAR SCREENING; LDL GOAL LESS THAN 100     Seasonal allergic rhinitis     Rotator cuff syndrome     Pain in joint, shoulder region     Hypothyroidism due to Hashimoto's thyroiditis     Closed compression fracture of first lumbar vertebra, initial encounter (H)     Compression fracture of T12 vertebra (H)     Past Surgical History:   Procedure Laterality Date     COLONOSCOPY N/A 4/7/2017    Procedure: COLONOSCOPY;  Surgeon: Christos Pena MD;  Location:  GI       Social History   Substance Use Topics     Smoking status: Never Smoker     Smokeless tobacco: Never Used     Alcohol use No     Family History   Problem Relation Age of Onset     GASTROINTESTINAL DISEASE Father      hepatitis, unclear etiology, may be autoimmune     GASTROINTESTINAL DISEASE Brother      hepatitis, unclear etiology     Diabetes Other            Reviewed and updated as needed this  "visit by clinical staff  Tobacco  Allergies  Meds  Med Hx  Surg Hx  Fam Hx  Soc Hx      Reviewed and updated as needed this visit by Provider         ROS:      OBJECTIVE:     /72 (BP Location: Right arm, Patient Position: Chair, Cuff Size: Adult Regular)  Pulse 50  Temp 98  F (36.7  C) (Oral)  Resp 16  Ht 4' 9.5\" (1.461 m)  Wt 116 lb (52.6 kg)  LMP 03/31/2006  SpO2 96%  BMI 24.67 kg/m2  Body mass index is 24.67 kg/(m^2).  GENERAL: healthy, alert and no distress        ASSESSMENT/PLAN:             1. Compression fracture of T12 vertebra (H)  Discussed the results of the dexa scan, it looks like mostly she does have Osteoporosis of the lumbar spine, at this time, will continue on calcitonin nasal spray, and when she is done in 1 month, will start on fosamax once weekly, for 3 to 5 years.  Meanwhile, advised to start on weight bearing exercise and take calcium with vit D twice daily.          Manny Martínez MD  [facility]"

## 2018-08-13 NOTE — PATIENT INSTRUCTIONS
Take Calcium 500-600 mg with vit D twice daily   Food with calcium in it :   Milk, cheese, yogurt,seeds, salmon/sardine, almonds and beans/lentil.

## 2018-08-14 ENCOUNTER — TELEPHONE (OUTPATIENT)
Dept: FAMILY MEDICINE | Facility: CLINIC | Age: 53
End: 2018-08-14

## 2018-08-14 DIAGNOSIS — M80.80XS LOCALIZED OSTEOPOROSIS WITH CURRENT PATHOLOGICAL FRACTURE, SEQUELA: Primary | ICD-10-CM

## 2018-08-14 RX ORDER — ALENDRONATE SODIUM 70 MG/1
TABLET ORAL
Qty: 12 TABLET | Refills: 3 | Status: SHIPPED | OUTPATIENT
Start: 2018-08-14 | End: 2019-11-15

## 2018-08-14 NOTE — TELEPHONE ENCOUNTER
Please call Bhavani, as we expected she does have osteoporosis (we already talked about it in the office yesterday)  I recommend that we start on Flomax once weekly (start on it in 1 month once she is done with using her nasal spray) once she takes the medicine, make sure that she does not lay down for 1 hour.  Then continue on it once weekly for 3 to 5 years, meanwhile, encourage high calcium food : Milk, cheese, yogurt,seeds, salmon/sardine, almonds and beans/lentil.  Also her calcium and vit D twice daily.  Manny Martínez MD  [facility]  [phone]

## 2018-08-15 NOTE — TELEPHONE ENCOUNTER
Informed pt of AA's message and she states understanding.  A copy of her dexa results have been mailed to her home - per her request.  Mirian Boyce, CMA

## 2018-08-26 ENCOUNTER — HEALTH MAINTENANCE LETTER (OUTPATIENT)
Age: 53
End: 2018-08-26

## 2018-09-10 ENCOUNTER — HOSPITAL ENCOUNTER (OUTPATIENT)
Dept: GENERAL RADIOLOGY | Facility: CLINIC | Age: 53
Discharge: HOME | End: 2018-09-10
Attending: NURSE PRACTITIONER | Admitting: NURSE PRACTITIONER
Payer: COMMERCIAL

## 2018-09-10 ENCOUNTER — OFFICE VISIT (OUTPATIENT)
Dept: NEUROSURGERY | Facility: CLINIC | Age: 53
End: 2018-09-10
Attending: NURSE PRACTITIONER
Payer: COMMERCIAL

## 2018-09-10 VITALS
BODY MASS INDEX: 24.35 KG/M2 | HEIGHT: 58 IN | SYSTOLIC BLOOD PRESSURE: 99 MMHG | OXYGEN SATURATION: 95 % | DIASTOLIC BLOOD PRESSURE: 53 MMHG | HEART RATE: 55 BPM | WEIGHT: 116 LBS

## 2018-09-10 DIAGNOSIS — S22.080A T12 COMPRESSION FRACTURE (H): ICD-10-CM

## 2018-09-10 DIAGNOSIS — S32.018A OTHER CLOSED FRACTURE OF FIRST LUMBAR VERTEBRA, INITIAL ENCOUNTER (H): ICD-10-CM

## 2018-09-10 DIAGNOSIS — S32.018G OTHER CLOSED FRACTURE OF FIRST LUMBAR VERTEBRA WITH DELAYED HEALING, SUBSEQUENT ENCOUNTER: Primary | ICD-10-CM

## 2018-09-10 PROCEDURE — 99213 OFFICE O/P EST LOW 20 MIN: CPT | Performed by: NURSE PRACTITIONER

## 2018-09-10 PROCEDURE — 72110 X-RAY EXAM L-2 SPINE 4/>VWS: CPT

## 2018-09-10 PROCEDURE — G0463 HOSPITAL OUTPT CLINIC VISIT: HCPCS

## 2018-09-10 ASSESSMENT — PAIN SCALES - GENERAL: PAINLEVEL: MODERATE PAIN (5)

## 2018-09-10 NOTE — LETTER
[facility]   [facility]  [address] [place] [address]  [id number]    September 10, 2018      To Whom it May Concern,          Bhavani Calderon was seen in clinic on 9/10/18.  She can continue working with the following restrictions:      Restrictions:     - No lifting greater than 10 pounds.  - No repetitive bending, twisting, or overhead reaching  - 5 minute hourly stretch breaks      - Will reassess at next appointment in 6 weeks.               Sincerely,          Sugar Sanz CNP  [facility]  [address] [address]    Tel [phone]

## 2018-09-10 NOTE — PROGRESS NOTES
"[facility]  Neurosurgery followup:    HPI: Bhavani Calderon is a 52 year old female who was initially seen at our clinic on 8/6/18 for evaluation of low back pain with a T12 and L1 compression fracture. She presents today for her 6 week follow up appointment.  She denies change in her pain, stating the pain is 5/10 today.  She has a prescription for Tramadol but states she rarely likes to take it, \"I don't like how I feel.\"  She states the pain is along the lower back.  She denies lumbar radicular pain.  She has been wearing her TLSO when out of bed.  She describes the front portion of the brace as bothersome, \"It feels a bit tight.\"  She denies falls since her last visit.  She has returned to work with restrictions.    Exam:  Constitutional:  Alert, well nourished, NAD.  HEENT: Normocephalic, atraumatic.   Pulm:  Without shortness of breath   CV:  No pitting edema of BLE.      Neurological:  Awake  Alert  Oriented x 3  Motor exam:        IP Q DF PF EHL  R   5  5   5   5    5  L   5  5   5   5    5     Able to spontaneously move L/E bilaterally  Sensation intact throughout all L/E dermatomes    Tenderness with palpation to thoracic spine, lower portion.  No tenderness to palpation of lumbar spine.     Imaging: Minimal wedging of T12 and L1.    A/P: Bhavani Calderon is a 52 year old female who was initially seen at our clinic on 8/6/18 for evaluation of low back pain with a T12 and L1 compression fracture. She presents today for her 6 week follow up appointment.  She denies change in her pain, stating the pain is 5/10 today.  She has a prescription for Tramadol but states she rarely likes to take it, \"I don't like how I feel.\"  She states the pain is along the lower back.  She denies lumbar radicular pain.  She has been wearing her TLSO when out of bed.  She describes the front portion of the brace as bothersome, \"It feels a bit tight.\"  She denies falls since her last visit.  She has returned to work " with restrictions.  We reviewed Xrays today and discussed continuing with bracing x6 weeks with f/u Xrays at that time.  Pt is agreeable.  She will contact us if changes in her pain or any radicular symptoms.   We will see if we can get her brace adjusted today as she finds the front portion of the brace more bothersome.    Patient Instructions   -Continue brace when out of bed   -Continues restrictions no lifting >10 pounds, no twisting or bending  -Tramadol as needed  -Return in 6 weeks with Xray prior  -Please contact the clinic if pain persists at [phone].      Sugar Sanz [facility]  [facility]  [address] [address]    Tel [phone]  Pager [phone]

## 2018-09-10 NOTE — MR AVS SNAPSHOT
After Visit Summary   9/10/2018    Bhavani Calderon    MRN: [id number]           Patient Information     Date Of Birth          1965        Visit Information        Provider Department      9/10/2018 3:00 PM Sugar Sanz NP [facility]        Today's Diagnoses     Other closed fracture of first lumbar vertebra with delayed healing, subsequent encounter    -  1      Care Instructions    -Continue brace when out of bed   -Continues restrictions no lifting >10 pounds, no twisting or bending  -Tramadol as needed  -Return in 6 weeks with Xray prior  -Please contact the clinic if pain persists at [phone].            Follow-ups after your visit        Future tests that were ordered for you today     Open Future Orders        Priority Expected Expires Ordered    XR Lumbar Spine G/E 4 Views Routine 10/24/2018 9/10/2019 9/10/2018            Who to contact     If you have questions or need follow up information about today's clinic visit or your schedule please contact [facility] directly at [phone].  Normal or non-critical lab and imaging results will be communicated to you by PathCentralhart, letter or phone within 4 business days after the clinic has received the results. If you do not hear from us within 7 days, please contact the clinic through PathCentralhart or phone. If you have a critical or abnormal lab result, we will notify you by phone as soon as possible.  Submit refill requests through [employer] or call your pharmacy and they will forward the refill request to us. Please allow 3 business days for your refill to be completed.          Additional Information About Your Visit        PathCentralhart Information     [employer] gives you secure access to your electronic health record. If you see a primary care provider, you can also send messages to your care team and make appointments. If you have questions, please call your primary care clinic.  If you do not have a  "primary care provider, please call [phone] and they will assist you.        Care EveryWhere ID     This is your Care EveryWhere ID. This could be used by other organizations to access your [place] medical records  [id number]        Your Vitals Were     Pulse Height Last Period Pulse Oximetry BMI (Body Mass Index)       55 4' 9.5\" (1.461 m) 03/31/2006 95% 24.67 kg/m2        Blood Pressure from Last 3 Encounters:   09/10/18 99/53   08/13/18 129/72   08/06/18 107/62    Weight from Last 3 Encounters:   09/10/18 116 lb (52.6 kg)   08/13/18 116 lb (52.6 kg)   08/06/18 114 lb 6.4 oz (51.9 kg)               Primary Care Provider Office Phone # Fax #    Manny Martínez [phone] [address] [facility] [address]        Equal Access to Services     [facility]: Hadii fransisco ku hadasho Sodavidali, waaxda luqadaha, qaybta kaalmada adeegyada, emeterio woodson . So [facility] [phone].    ATENCIÓN: Si enzola español, tiene a mitchell disposición servicios gratuitos de asistencia lingüística. Llame al [phone].    We comply with applicable federal civil rights laws and [place] laws. We do not discriminate on the basis of race, color, national origin, age, disability, sex, sexual orientation, or gender identity.            Thank you!     Thank you for choosing [facility]  for your care. Our goal is always to provide you with excellent care. Hearing back from our patients is one way we can continue to improve our services. Please take a few minutes to complete the written survey that you may receive in the mail after your visit with us. Thank you!             Your Updated Medication List - Protect others around you: Learn how to safely use, store and throw away your medicines at www.disposemymeds.org.          This list is accurate as of 9/10/18  3:23 PM.  Always use your most recent med list.                   Brand Name Dispense Instructions for use Diagnosis "    alendronate 70 MG tablet    FOSAMAX    12 tablet    Take 1 tablet (70 mg) by mouth with 8oz water every 7 days 30 minutes before breakfast and remain upright during this time.    Localized osteoporosis with current pathological fracture, sequela       calcitonin (salmon) 200 UNIT/ACT nasal spray    MIACALCIN    1 Bottle    Spray 1 spray into one nostril alternating nostrils daily Alternate nostril each day.    Closed compression fracture of first lumbar vertebra, initial encounter (H), Compression fracture of T12 vertebra (H)       fluticasone 50 MCG/ACT spray    FLONASE    1 Bottle    Spray 2 sprays into both nostrils daily    Chronic seasonal allergic rhinitis, unspecified trigger       levothyroxine 100 MCG tablet    SYNTHROID/LEVOTHROID    90 tablet    Take 1 tablet (100 mcg) by mouth daily    Hypothyroidism due to Hashimoto's thyroiditis       traMADol 50 MG tablet    ULTRAM    40 tablet    Take 1 tablet (50 mg) by mouth every 6 hours as needed for severe pain    Other closed fracture of first lumbar vertebra, initial encounter (H)

## 2018-09-10 NOTE — PATIENT INSTRUCTIONS
-Continue brace when out of bed   -Continues restrictions no lifting >10 pounds, no twisting or bending  -Tramadol as needed  -Return in 6 weeks with Xray prior  -Please contact the clinic if pain persists at [phone].

## 2018-09-10 NOTE — NURSING NOTE
"Bhavani Calderon is a 52 year old female who presents for:  Chief Complaint   Patient presents with     Neurologic Problem     6 week follow up closed compression fracture of first lumbar vertebra and compression fracture of T12 vertebra         Vitals:    Vitals:    09/10/18 1458   BP: 99/53   BP Location: Right arm   Patient Position: Sitting   Cuff Size: Adult Regular   Pulse: 55   SpO2: 95%   Weight: 116 lb (52.6 kg)   Height: 4' 9.5\" (1.461 m)       BMI:  Estimated body mass index is 24.67 kg/(m^2) as calculated from the following:    Height as of this encounter: 4' 9.5\" (1.461 m).    Weight as of this encounter: 116 lb (52.6 kg).    Pain Score:  Moderate Pain (5)      Do you feel safe in your environment?  Yes      Ainsley Rodriguez          "

## 2018-09-10 NOTE — LETTER
"    9/10/2018         RE: Bhavani Calderon  [address] [facility] [address]        Dear Colleague,    Thank you for referring your patient, Bhavani Calderon, to the [facility]. Please see a copy of my visit note below.    [facility]  Neurosurgery followup:    HPI: Bhavani Calderon is a 52 year old female who was initially seen at our clinic on 8/6/18 for evaluation of low back pain with a T12 and L1 compression fracture. She presents today for her 6 week follow up appointment.  She denies change in her pain, stating the pain is 5/10 today.  She has a prescription for Tramadol but states she rarely likes to take it, \"I don't like how I feel.\"  She states the pain is along the lower back.  She denies lumbar radicular pain.  She has been wearing her TLSO when out of bed.  She describes the front portion of the brace as bothersome, \"It feels a bit tight.\"  She denies falls since her last visit.  She has returned to work with restrictions.    Exam:  Constitutional:  Alert, well nourished, NAD.  HEENT: Normocephalic, atraumatic.   Pulm:  Without shortness of breath   CV:  No pitting edema of BLE.      Neurological:  Awake  Alert  Oriented x 3  Motor exam:        IP Q DF PF EHL  R   5  5   5   5    5  L   5  5   5   5    5     Able to spontaneously move L/E bilaterally  Sensation intact throughout all L/E dermatomes    Tenderness with palpation to thoracic spine, lower portion.  No tenderness to palpation of lumbar spine.     Imaging: Minimal wedging of T12 and L1.    A/P: Bhavani Calderon is a 52 year old female who was initially seen at our clinic on 8/6/18 for evaluation of low back pain with a T12 and L1 compression fracture. She presents today for her 6 week follow up appointment.  She denies change in her pain, stating the pain is 5/10 today.  She has a prescription for Tramadol but states she rarely likes to take it, \"I don't like how I feel.\"  She states the pain is " "along the lower back.  She denies lumbar radicular pain.  She has been wearing her TLSO when out of bed.  She describes the front portion of the brace as bothersome, \"It feels a bit tight.\"  She denies falls since her last visit.  She has returned to work with restrictions.  We reviewed Xrays today and discussed continuing with bracing x6 weeks with f/u Xrays at that time.  Pt is agreeable.  She will contact us if changes in her pain or any radicular symptoms.   We will see if we can get her brace adjusted today as she finds the front portion of the brace more bothersome.    Patient Instructions   -Continue brace when out of bed   -Continues restrictions no lifting >10 pounds, no twisting or bending  -Tramadol as needed  -Return in 6 weeks with Xray prior  -Please contact the clinic if pain persists at [phone].      Sugar Sanz [facility]  [facility]  [address] [address]    Tel [phone]  Pager [phone]      Again, thank you for allowing me to participate in the care of your patient.        Sincerely,        Sugar Sanz, NP    "

## 2018-10-22 ENCOUNTER — HOSPITAL ENCOUNTER (OUTPATIENT)
Dept: GENERAL RADIOLOGY | Facility: CLINIC | Age: 53
End: 2018-10-22
Attending: NURSE PRACTITIONER
Payer: COMMERCIAL

## 2018-10-22 DIAGNOSIS — S32.018G OTHER CLOSED FRACTURE OF FIRST LUMBAR VERTEBRA WITH DELAYED HEALING, SUBSEQUENT ENCOUNTER: ICD-10-CM

## 2018-10-22 PROCEDURE — 72110 X-RAY EXAM L-2 SPINE 4/>VWS: CPT

## 2018-10-31 ENCOUNTER — OFFICE VISIT (OUTPATIENT)
Dept: NEUROSURGERY | Facility: CLINIC | Age: 53
End: 2018-10-31
Attending: NEUROLOGICAL SURGERY
Payer: COMMERCIAL

## 2018-10-31 VITALS
HEART RATE: 61 BPM | HEIGHT: 60 IN | WEIGHT: 116 LBS | SYSTOLIC BLOOD PRESSURE: 115 MMHG | DIASTOLIC BLOOD PRESSURE: 68 MMHG | BODY MASS INDEX: 22.78 KG/M2

## 2018-10-31 DIAGNOSIS — M51.369 LUMBAR DEGENERATIVE DISC DISEASE: Primary | ICD-10-CM

## 2018-10-31 DIAGNOSIS — S32.018A OTHER CLOSED FRACTURE OF FIRST LUMBAR VERTEBRA, INITIAL ENCOUNTER (H): ICD-10-CM

## 2018-10-31 DIAGNOSIS — S22.080A T12 COMPRESSION FRACTURE (H): ICD-10-CM

## 2018-10-31 DIAGNOSIS — S32.018G OTHER CLOSED FRACTURE OF FIRST LUMBAR VERTEBRA WITH DELAYED HEALING, SUBSEQUENT ENCOUNTER: ICD-10-CM

## 2018-10-31 PROCEDURE — G0463 HOSPITAL OUTPT CLINIC VISIT: HCPCS

## 2018-10-31 PROCEDURE — 99213 OFFICE O/P EST LOW 20 MIN: CPT | Performed by: NEUROLOGICAL SURGERY

## 2018-10-31 ASSESSMENT — PAIN SCALES - GENERAL: PAINLEVEL: NO PAIN (0)

## 2018-10-31 NOTE — NURSING NOTE
Bhavani Calderon is a 52 year old female who presents for:  Chief Complaint   Patient presents with     Neurologic Problem     Pt states she wanted to be schedule w/a Dr for her f/u. Scheduled her same day Dr. Nye is in clinic. 6 wk f/u from LOV w/ xray prior. Pt scheduled XR for 10/22/18 (states she wanted them to get the results prior to appt). Closed compression fracture of first lumbar vertebra/Compression fracture of T12 vertebra        Vitals:    Vitals:    10/31/18 1455   BP: 115/68   BP Location: Right arm   Patient Position: Sitting   Cuff Size: Adult Regular   Pulse: 61   Weight: 116 lb (52.6 kg)   Height: 5' (1.524 m)       BMI:  Estimated body mass index is 22.65 kg/(m^2) as calculated from the following:    Height as of this encounter: 5' (1.524 m).    Weight as of this encounter: 116 lb (52.6 kg).    Pain Score:  No Pain (0)      Do you feel safe in your environment?  Yes      Salome Cruz

## 2018-10-31 NOTE — MR AVS SNAPSHOT
After Visit Summary   10/31/2018    Bhavani Calderon    MRN: [id number]           Patient Information     Date Of Birth          1965        Visit Information        Provider Department      10/31/2018 2:40 PM Pedro Nye MD [facility]        Today's Diagnoses     Lumbar degenerative disc disease    -  1      Care Instructions    -Schedule physical therapy  -Ok to remove brace  -Please contact the clinic if pain persists at [phone].            Follow-ups after your visit        Additional Services     [facility] PT, HAND, AND CHIROPRACTIC REFERRAL       Physical Therapy, Hand Therapy and Chiropractic Care are available through:  *[facility]  *Hand Therapy (Occupational Therapy or Physical Therapy)  *[facility]    Call one number to schedule at any of the above locations: [phone].    Physical therapy, Hand therapy and/or Chiropractic care has been recommended by your physician as an excellent treatment option to reduce pain and help people return to normal activities, including sports.  Therapy and/or chiropractic care services are a great complement or alternative to expensive and invasive surgery, injections, or long-term use of prescription medications. The primary goal is to identify the underlying problem and provide you the tools to manage your condition on your own.     Please be aware that coverage of these services is subject to the terms and limitations of your health insurance plan.  Call member services at your health plan with any benefit or coverage questions.      Please bring the following to your appointment:  *Your personal calendar for scheduling future appointments  *Comfortable clothing                  Future tests that were ordered for you today     Open Future Orders        Priority Expected Expires Ordered    [facility] PT, HAND, AND CHIROPRACTIC REFERRAL Routine  10/31/2019 10/31/2018             Who to contact     If you have questions or need follow up information about today's clinic visit or your schedule please contact [facility] directly at [phone].  Normal or non-critical lab and imaging results will be communicated to you by MyChart, letter or phone within 4 business days after the clinic has received the results. If you do not hear from us within 7 days, please contact the clinic through MyChart or phone. If you have a critical or abnormal lab result, we will notify you by phone as soon as possible.  Submit refill requests through [employer] or call your pharmacy and they will forward the refill request to us. Please allow 3 business days for your refill to be completed.          Additional Information About Your Visit        [facility] Information     [employer] gives you secure access to your electronic health record. If you see a primary care provider, you can also send messages to your care team and make appointments. If you have questions, please call your primary care clinic.  If you do not have a primary care provider, please call [phone] and they will assist you.        Care EveryWhere ID     This is your Care EveryWhere ID. This could be used by other organizations to access your [facility] medical records  [id number]        Your Vitals Were     Pulse Height Last Period Breastfeeding? BMI (Body Mass Index)       61 5' (1.524 m) 03/31/2006 No 22.65 kg/m2        Blood Pressure from Last 3 Encounters:   10/31/18 115/68   09/10/18 99/53   08/13/18 129/72    Weight from Last 3 Encounters:   10/31/18 116 lb (52.6 kg)   09/10/18 116 lb (52.6 kg)   08/13/18 116 lb (52.6 kg)               Primary Care Provider Office Phone # Fax #    Manny Martínez [phone] [address] [facility] [address]        Equal Access to Services     ALYSHA LOZADA : Linnette Watson, tyrone rodas, qaybta robin tinajero, emeterio chin  la'villa vivas. So [facility] [phone].    ATENCIÓN: Si enzola alea, tiene a mitchell disposición servicios gratuitos de asistencia lingüística. Ольга martinez [phone].    We comply with applicable federal civil rights laws and [place] laws. We do not discriminate on the basis of race, color, national origin, age, disability, sex, sexual orientation, or gender identity.            Thank you!     Thank you for choosing [facility]  for your care. Our goal is always to provide you with excellent care. Hearing back from our patients is one way we can continue to improve our services. Please take a few minutes to complete the written survey that you may receive in the mail after your visit with us. Thank you!             Your Updated Medication List - Protect others around you: Learn how to safely use, store and throw away your medicines at www.disposemymeds.org.          This list is accurate as of 10/31/18  3:20 PM.  Always use your most recent med list.                   Brand Name Dispense Instructions for use Diagnosis    alendronate 70 MG tablet    FOSAMAX    12 tablet    Take 1 tablet (70 mg) by mouth with 8oz water every 7 days 30 minutes before breakfast and remain upright during this time.    Localized osteoporosis with current pathological fracture, sequela       calcitonin (salmon) 200 UNIT/ACT nasal spray    MIACALCIN    1 Bottle    Spray 1 spray into one nostril alternating nostrils daily Alternate nostril each day.    Closed compression fracture of first lumbar vertebra, initial encounter (H), Compression fracture of T12 vertebra (H)       fluticasone 50 MCG/ACT spray    FLONASE    1 Bottle    Spray 2 sprays into both nostrils daily    Chronic seasonal allergic rhinitis, unspecified trigger       levothyroxine 100 MCG tablet    SYNTHROID/LEVOTHROID    90 tablet    Take 1 tablet (100 mcg) by mouth daily    Hypothyroidism due to Hashimoto's thyroiditis       traMADol 50 MG tablet    ULTRAM    40  tablet    Take 1 tablet (50 mg) by mouth every 6 hours as needed for severe pain    Other closed fracture of first lumbar vertebra, initial encounter (H)

## 2018-10-31 NOTE — LETTER
[facility]   Spine and Brain Clinic  [address] [place] [address]  [id number]    October 31, 2018      To Whom it May Concern,          Bhavani Calderon was seen in clinic on 10/31/18.  She may return to work without restrictions starting 11/1/18.        Sincerely,              Sugar Sanz CNP  [facility]  [address] [address]    Tel [phone]

## 2018-10-31 NOTE — LETTER
10/31/2018         RE: Bhavani Calderon  [address] [facility] [address]        Dear Colleague,    Thank you for referring your patient, Bhavani Calderon, to the [facility]. Please see a copy of my visit note below.    [facility]  Neurosurgery followup:    HPI: Ms Calderon is a 52 year old female who was seen at our clinic initially on 8/6/18 for low back pain resulting after a fall on 7/9/18.  MRI showed T12 and L1 fracture and she was ordered bracing.  She is here today for f/u.  She states she has minimal pain, on occasion experiencing low back pain when her brace first comes off.  She denies lumbar radicular pain.  She denies BLE weakness or falls since her last visit on 9/10/18.    Exam:  Constitutional:  Alert, well nourished, NAD.  HEENT: Normocephalic, atraumatic.   Pulm:  Without shortness of breath   CV:  No pitting edema of BLE.      Neurological:  Awake  Alert  Oriented x 3  Motor exam:        IP Q DF PF EHL  R   5  5   5   5    5  L   5  5   5   5    5     Able to spontaneously move L/E bilaterally  Sensation intact throughout all L/E dermatomes     Imaging:  Lumbar XR shows no new fractures, improved T12 and L1 fractures    A/P:   T12 and L1 vertebral body fractures, healed/mimimal      Ms Calderon is a 52 year old female who was seen at our clinic initially on 8/6/18 for low back pain resulting after a fall on 7/9/18.  MRI showed T12 and L1 fracture and she was ordered bracing.  She is here today for f/u.  She states she has minimal pain, on occasion experiencing low back pain when her brace first comes off.  She denies lumbar radicular pain.  She denies BLE weakness or falls since her last visit on 9/10/18.  Reviewed XR with patient and recommendation to discontinue brace, may return to work without restrictions and ok for PT.      Patient Instructions   -Schedule physical therapy  -Ok to remove brace  -Please contact the clinic if pain persists at  [phone].      This visit was a visit with Dr. Pedro Nye, and I, Sugar Sanz CNP acted as a scribe.    Sugar Sanz CNP  [facility]  [address] [address]    Tel [phone]  Pager [phone]          Addendum:  Agree with above note  Patient seen and examined  May remove TLSO brace and increase activity  He may return to work with no restrictions  We will also recommend physical therapy    Pedro Nye MD, MS, FAANS      Again, thank you for allowing me to participate in the care of your patient.        Sincerely,        Pedro Nye MD

## 2018-10-31 NOTE — PATIENT INSTRUCTIONS
-Schedule physical therapy  -Ok to remove brace  -Please contact the clinic if pain persists at [phone].

## 2018-11-05 ENCOUNTER — THERAPY VISIT (OUTPATIENT)
Dept: PHYSICAL THERAPY | Facility: CLINIC | Age: 53
End: 2018-11-05
Attending: NURSE PRACTITIONER
Payer: COMMERCIAL

## 2018-11-05 DIAGNOSIS — M51.369 LUMBAR DEGENERATIVE DISC DISEASE: ICD-10-CM

## 2018-11-05 PROCEDURE — 97161 PT EVAL LOW COMPLEX 20 MIN: CPT | Mod: GP | Performed by: PHYSICAL THERAPIST

## 2018-11-05 PROCEDURE — 97110 THERAPEUTIC EXERCISES: CPT | Mod: GP | Performed by: PHYSICAL THERAPIST

## 2018-11-05 NOTE — PROGRESS NOTES
[facility] Initial Evaluation  Bhavani Calderon is a 52 year old  female referred to physical therapy by Dr. Sanz for treatment of low back pain with Precautions/Restrictions/MD instructions eval and treat     Physical Therapy Initial Evaluation: Subjective History      Injury/Condition Details:  Presenting Complaint Low back pain   Onset Timing/Date July 2018   Mechanism Patient sustained a fall in which she fell from top steps landing on low back resulting in fractures of both T12 and L1 vertebrae      Symptom Behavior Details    Primary Pain Symptoms Location: Mid/lower back pain  Quality: sharp   Frequency: Intermittent   Worst Pain 6/10   Best Pain 2/10   Symptom Provocators Prolonged walking, lifting, forward bending, driving   Symptom Relievers Medication   Time of day dependent? Evenings, nights   Recent symptom change? Small improvement since onset      Prior Testing/Intervention for current condition:  Prior Tests X-ray of lumbar spine on 10/22/18 indicating:  IMPRESSION: Known T12 and L1 vertebral body fractures are not well visualized on these radiographs, likely represented by very subtle sclerosis in the anterior aspect of the vertebral bodies. No new fractures are seen. Disc spaces are preserved. No listhesis identified at any level. No fracture or related motion is seen on flexion/extension position images.   Prior Treatment None      Lifestyle & General Medical History:  General Health Reported by Patient good   Employment Paraprofessional   Usual physical activities  (within past year) [place], dancing, jumping, running   Orthopaedic history None   Notable medical history Seizures, thyroid problems       HPI                    Objective:  Standing Alignment:        Lumbar:  Anterior pelvic tilt                           Lumbar/SI Evaluation  ROM:    AROM Lumbar:   Flexion:            75%, moderate pain  Ext:                    50%, mild pain   Side Bend:        Left:  50%,  moderate pain    Right:  75%, mild pain  Rotation:           Left:  66%, moderate pain    Right:  75%, mild pain  Side Glide:        Left:     Right:           Lumbar Myotomes:  normal                  Neural Tension/Mobility:  Lumbar:  Normal              Spinal Segmental Conclusions:     Level: Hypo noted at L1, L2, L3, L4 and L5                                        Hip Evaluation    Hip Strength:      Extension:  Left: 4/5  Pain:Right: 4+/5    Pain:    Abduction:  Left: 4/5     Pain:Right: 4+/5    Pain:                                 General     ROS    Assessment/Plan:    Patient is a 52 year old female with lumbar complaints.    Patient has the following significant findings with corresponding treatment plan.                Diagnosis 1:  LBP  Pain -  manual therapy, splint/taping/bracing/orthotics, self management, education, directional preference exercise and home program  Decreased ROM/flexibility - manual therapy, therapeutic exercise, therapeutic activity and home program  Decreased joint mobility - manual therapy, therapeutic exercise, therapeutic activity and home program  Decreased strength - therapeutic exercise, therapeutic activities and home program  Decreased proprioception - neuro re-education, therapeutic activities and home program  Impaired muscle performance - neuro re-education and home program    Therapy Evaluation Codes:   1) History comprised of:   Personal factors that impact the plan of care:      None.    Comorbidity factors that impact the plan of care are:      Seizures and thyroid problems.     Medications impacting care: Pain.  2) Examination of Body Systems comprised of:   Body structures and functions that impact the plan of care:      Lumbar spine.   Activity limitations that impact the plan of care are:      Driving, Lifting, Sitting, Standing, Walking and Working.  3) Clinical presentation characteristics are:   Stable/Uncomplicated.  4) Decision-Making    Low complexity  using standardized patient assessment instrument and/or measureable assessment of functional outcome.  Cumulative Therapy Evaluation is: Low complexity.    Previous and current functional limitations:  (See Goal Flow Sheet for this information)    Short term and Long term goals: (See Goal Flow Sheet for this information)     Communication ability:  Patient appears to be able to clearly communicate and understand verbal and written communication and follow directions correctly.  Treatment Explanation - The following has been discussed with the patient:   RX ordered/plan of care  Anticipated outcomes  Possible risks and side effects  This patient would benefit from PT intervention to resume normal activities.   Rehab potential is good.    Frequency:  1 X week, once daily  Duration:  for 8 weeks  Discharge Plan:  Achieve all LTG.  Independent in home treatment program.  Reach maximal therapeutic benefit.    Please refer to the daily flowsheet for treatment today, total treatment time and time spent performing 1:1 timed codes.

## 2018-11-12 ENCOUNTER — THERAPY VISIT (OUTPATIENT)
Dept: PHYSICAL THERAPY | Facility: CLINIC | Age: 53
End: 2018-11-12
Payer: COMMERCIAL

## 2018-11-12 DIAGNOSIS — M51.369 LUMBAR DEGENERATIVE DISC DISEASE: ICD-10-CM

## 2018-11-12 PROCEDURE — 97140 MANUAL THERAPY 1/> REGIONS: CPT | Mod: GP | Performed by: PHYSICAL THERAPIST

## 2018-11-12 PROCEDURE — 97110 THERAPEUTIC EXERCISES: CPT | Mod: GP | Performed by: PHYSICAL THERAPIST

## 2018-11-12 PROCEDURE — 97112 NEUROMUSCULAR REEDUCATION: CPT | Mod: GP | Performed by: PHYSICAL THERAPIST

## 2018-11-19 ENCOUNTER — THERAPY VISIT (OUTPATIENT)
Dept: PHYSICAL THERAPY | Facility: CLINIC | Age: 53
End: 2018-11-19
Payer: COMMERCIAL

## 2018-11-19 DIAGNOSIS — M51.369 LUMBAR DEGENERATIVE DISC DISEASE: ICD-10-CM

## 2018-11-19 PROCEDURE — 97112 NEUROMUSCULAR REEDUCATION: CPT | Mod: GP | Performed by: PHYSICAL THERAPIST

## 2018-11-19 PROCEDURE — 97110 THERAPEUTIC EXERCISES: CPT | Mod: GP | Performed by: PHYSICAL THERAPIST

## 2018-11-19 PROCEDURE — 97140 MANUAL THERAPY 1/> REGIONS: CPT | Mod: GP | Performed by: PHYSICAL THERAPIST

## 2018-11-28 ENCOUNTER — THERAPY VISIT (OUTPATIENT)
Dept: PHYSICAL THERAPY | Facility: CLINIC | Age: 53
End: 2018-11-28
Payer: COMMERCIAL

## 2018-11-28 DIAGNOSIS — M51.369 LUMBAR DEGENERATIVE DISC DISEASE: ICD-10-CM

## 2018-11-28 PROCEDURE — 97110 THERAPEUTIC EXERCISES: CPT | Mod: GP | Performed by: PHYSICAL THERAPIST

## 2018-11-28 PROCEDURE — 97112 NEUROMUSCULAR REEDUCATION: CPT | Mod: GP | Performed by: PHYSICAL THERAPIST

## 2018-11-28 PROCEDURE — 97140 MANUAL THERAPY 1/> REGIONS: CPT | Mod: GP | Performed by: PHYSICAL THERAPIST

## 2018-12-10 ENCOUNTER — THERAPY VISIT (OUTPATIENT)
Dept: PHYSICAL THERAPY | Facility: CLINIC | Age: 53
End: 2018-12-10
Payer: COMMERCIAL

## 2018-12-10 DIAGNOSIS — M51.369 LUMBAR DEGENERATIVE DISC DISEASE: ICD-10-CM

## 2018-12-10 PROCEDURE — 97110 THERAPEUTIC EXERCISES: CPT | Mod: GP | Performed by: PHYSICAL THERAPIST

## 2018-12-10 PROCEDURE — 97112 NEUROMUSCULAR REEDUCATION: CPT | Mod: GP | Performed by: PHYSICAL THERAPIST

## 2018-12-10 PROCEDURE — 97140 MANUAL THERAPY 1/> REGIONS: CPT | Mod: GP | Performed by: PHYSICAL THERAPIST

## 2018-12-26 ENCOUNTER — THERAPY VISIT (OUTPATIENT)
Dept: PHYSICAL THERAPY | Facility: CLINIC | Age: 53
End: 2018-12-26
Payer: COMMERCIAL

## 2018-12-26 DIAGNOSIS — M51.369 LUMBAR DEGENERATIVE DISC DISEASE: ICD-10-CM

## 2018-12-26 PROCEDURE — 97140 MANUAL THERAPY 1/> REGIONS: CPT | Mod: GP | Performed by: PHYSICAL THERAPIST

## 2018-12-26 PROCEDURE — 97110 THERAPEUTIC EXERCISES: CPT | Mod: GP | Performed by: PHYSICAL THERAPIST

## 2018-12-26 PROCEDURE — 97112 NEUROMUSCULAR REEDUCATION: CPT | Mod: GP | Performed by: PHYSICAL THERAPIST

## 2019-01-11 ENCOUNTER — THERAPY VISIT (OUTPATIENT)
Dept: PHYSICAL THERAPY | Facility: CLINIC | Age: 54
End: 2019-01-11
Payer: COMMERCIAL

## 2019-01-11 DIAGNOSIS — M51.369 LUMBAR DEGENERATIVE DISC DISEASE: ICD-10-CM

## 2019-01-11 PROCEDURE — 97110 THERAPEUTIC EXERCISES: CPT | Mod: GP | Performed by: PHYSICAL THERAPIST

## 2019-01-11 PROCEDURE — 97112 NEUROMUSCULAR REEDUCATION: CPT | Mod: GP | Performed by: PHYSICAL THERAPIST

## 2019-01-11 PROCEDURE — 97140 MANUAL THERAPY 1/> REGIONS: CPT | Mod: GP | Performed by: PHYSICAL THERAPIST

## 2019-01-30 DIAGNOSIS — E06.3 HYPOTHYROIDISM DUE TO HASHIMOTO'S THYROIDITIS: ICD-10-CM

## 2019-01-30 NOTE — TELEPHONE ENCOUNTER
"Requested Prescriptions   Pending Prescriptions Disp Refills     levothyroxine (SYNTHROID/LEVOTHROID) 100 MCG tablet 90 tablet 1     Sig: Take 1 tablet (100 mcg) by mouth daily    Thyroid Protocol Failed - 1/30/2019 10:42 AM       Failed - Normal TSH on file in past 12 months    Recent Labs   Lab Test 07/20/18  1014   TSH 24.73*             Passed - Patient is 12 years or older       Passed - Recent (12 mo) or future (30 days) visit within the authorizing provider's specialty    Patient had office visit in the last 12 months or has a visit in the next 30 days with authorizing provider or within the authorizing provider's specialty.  See \"Patient Info\" tab in inbasket, or \"Choose Columns\" in Meds & Orders section of the refill encounter.             Passed - Medication is active on med list       Passed - No active pregnancy on record    If patient is pregnant or has had a positive pregnancy test, please check TSH.         Passed - No positive pregnancy test in past 12 months    If patient is pregnant or has had a positive pregnancy test, please check TSH.          Last Written Prescription Date:  7/23/2018  Last Fill Quantity: 90,  # refills: 1   Last office visit: 8/13/2018 with prescribing provider:  Dr Martínez   Future Office Visit:   Next 5 appointments (look out 90 days)    Feb 01, 2019  9:00 AM CST  SHORT with Manny Martínez MD  [facility] ([facility]) [address] [place] [id number] [phone]           "

## 2019-01-31 NOTE — TELEPHONE ENCOUNTER
Manny Martínez MD          7/23/18 8:02 AM   Note      Please call Annette, her labs are showing low thyroid, I would like to increase her thyroid medicine to 100 mcg daily, (instead of 75 mcg) and want her to do lab only blood test in 3 months (october) to recheck her thyroid level.  Manny Martínez MD  [facility]  [phone]        Call out to pt, she has appt in am, will recheck thyroid lab prior to reordering same dose    Kinza Ivy RN, BS  Clinical Nurse Triage.

## 2019-02-01 ENCOUNTER — ANCILLARY PROCEDURE (OUTPATIENT)
Dept: GENERAL RADIOLOGY | Facility: CLINIC | Age: 54
End: 2019-02-01
Payer: COMMERCIAL

## 2019-02-01 ENCOUNTER — OFFICE VISIT (OUTPATIENT)
Dept: FAMILY MEDICINE | Facility: CLINIC | Age: 54
End: 2019-02-01
Payer: COMMERCIAL

## 2019-02-01 VITALS
HEART RATE: 60 BPM | OXYGEN SATURATION: 98 % | TEMPERATURE: 98 F | SYSTOLIC BLOOD PRESSURE: 110 MMHG | BODY MASS INDEX: 21.98 KG/M2 | RESPIRATION RATE: 20 BRPM | DIASTOLIC BLOOD PRESSURE: 62 MMHG | HEIGHT: 61 IN | WEIGHT: 116.4 LBS

## 2019-02-01 DIAGNOSIS — S22.080A COMPRESSION FRACTURE OF T12 VERTEBRA (H): ICD-10-CM

## 2019-02-01 DIAGNOSIS — E06.3 HYPOTHYROIDISM DUE TO HASHIMOTO'S THYROIDITIS: ICD-10-CM

## 2019-02-01 DIAGNOSIS — S32.10XS CLOSED FRACTURE OF SACRUM, UNSPECIFIED PORTION OF SACRUM, SEQUELA: ICD-10-CM

## 2019-02-01 DIAGNOSIS — E06.3 HYPOTHYROIDISM DUE TO HASHIMOTO'S THYROIDITIS: Primary | ICD-10-CM

## 2019-02-01 DIAGNOSIS — S22.080A COMPRESSION FRACTURE OF T12 VERTEBRA (H): Primary | ICD-10-CM

## 2019-02-01 LAB
T4 FREE SERPL-MCNC: 0.9 NG/DL (ref 0.76–1.46)
TSH SERPL DL<=0.005 MIU/L-ACNC: 6.87 MU/L (ref 0.4–4)

## 2019-02-01 PROCEDURE — 99214 OFFICE O/P EST MOD 30 MIN: CPT | Performed by: FAMILY MEDICINE

## 2019-02-01 PROCEDURE — 72100 X-RAY EXAM L-S SPINE 2/3 VWS: CPT

## 2019-02-01 PROCEDURE — 36415 COLL VENOUS BLD VENIPUNCTURE: CPT | Performed by: FAMILY MEDICINE

## 2019-02-01 PROCEDURE — 72220 X-RAY EXAM SACRUM TAILBONE: CPT

## 2019-02-01 PROCEDURE — 84443 ASSAY THYROID STIM HORMONE: CPT | Performed by: FAMILY MEDICINE

## 2019-02-01 PROCEDURE — 84439 ASSAY OF FREE THYROXINE: CPT | Performed by: FAMILY MEDICINE

## 2019-02-01 RX ORDER — LEVOTHYROXINE SODIUM 100 UG/1
100 TABLET ORAL DAILY
Qty: 30 TABLET | Refills: 0 | Status: SHIPPED
Start: 2019-02-01 | End: 2019-02-01

## 2019-02-01 RX ORDER — LEVOTHYROXINE SODIUM 100 UG/1
TABLET ORAL
Refills: 1 | COMMUNITY
Start: 2019-01-31 | End: 2019-02-01

## 2019-02-01 ASSESSMENT — MIFFLIN-ST. JEOR: SCORE: 1075.37

## 2019-02-01 NOTE — PROGRESS NOTES
SUBJECTIVE:   Bhavani Calderon is a 53 year old female who presents to clinic today for the following health issues:      Joint Pain- Lumbar     Onset: July 2018    Description:   Location: Lumbar Spine  Character: Sharp    Intensity: mild    Progression of Symptoms: much better.    When she twist there is sharp pain, and also she stands up for a long time.    Accompanying Signs & Symptoms:  Other symptoms: Pain radiates to waist into the lower abdomen    History:   Previous similar pain: no       Precipitating factors:   Trauma or overuse: not recently, but pt does work as [employer] service.    Alleviating factors:  Improved by: rest/inactivity    Therapies Tried and outcome: OTC's, laying down helps         Hypothyroidism Follow-up      Since last visit, patient describes the following symptoms: Weight stable, no hair loss, no skin changes, no constipation, no loose stools      Amount of exercise or physical activity: None    Problems taking medications regularly: No    Medication side effects: none    Diet: regular (no restrictions)           Problem list and histories reviewed & adjusted, as indicated.  Additional history: as documented    Patient Active Problem List   Diagnosis     CARDIOVASCULAR SCREENING; LDL GOAL LESS THAN 100     Seasonal allergic rhinitis     Rotator cuff syndrome     Pain in joint, shoulder region     Hypothyroidism due to Hashimoto's thyroiditis     Closed compression fracture of first lumbar vertebra, initial encounter (H)     Compression fracture of T12 vertebra (H)     Localized osteoporosis with current pathological fracture, sequela     Lumbar degenerative disc disease     Past Surgical History:   Procedure Laterality Date     COLONOSCOPY N/A 4/7/2017    Procedure: COLONOSCOPY;  Surgeon: Christos Pena MD;  Location:  GI       Social History     Tobacco Use     Smoking status: Never Smoker     Smokeless tobacco: Never Used   Substance Use Topics     Alcohol use: No      "Alcohol/week: 0.0 oz     Family History   Problem Relation Age of Onset     Gastrointestinal Disease Father         hepatitis, unclear etiology, may be autoimmune     Gastrointestinal Disease Brother         hepatitis, unclear etiology     Diabetes Other          Current Outpatient Medications   Medication Sig Dispense Refill     alendronate (FOSAMAX) 70 MG tablet Take 1 tablet (70 mg) by mouth with 8oz water every 7 days 30 minutes before breakfast and remain upright during this time. 12 tablet 3     levothyroxine (SYNTHROID/LEVOTHROID) 100 MCG tablet   1     Allergies   Allergen Reactions     Penicillins        Reviewed and updated as needed this visit by clinical staff       Reviewed and updated as needed this visit by Provider         ROS:  CONSTITUTIONAL:NEGATIVE for fever, chills, change in weight  ENDOCRINE: NEGATIVE for temperature intolerance, skin/hair changes    OBJECTIVE:     /62 (BP Location: Right arm, Patient Position: Chair, Cuff Size: Adult Regular)   Pulse 60   Temp 98  F (36.7  C) (Oral)   Resp 20   Ht 1.549 m (5' 1\")   Wt 52.8 kg (116 lb 6.4 oz)   LMP 03/31/2006   SpO2 98%   BMI 21.99 kg/m    Body mass index is 21.99 kg/m .  GENERAL: healthy, alert and no distress  NECK: no adenopathy, no asymmetry, masses, or scars and thyroid normal to palpation  RESP: lungs clear to auscultation - no rales, rhonchi or wheezes  CV: regular rate and rhythm, normal S1 S2, no S3 or S4, no murmur, click or rub, no peripheral edema and peripheral pulses strong  MS: Patient appears to be in no pain, no antalgic gait noted.   Lumbosacral spine area reveals mild upper lumbar local tenderness or mass.    ROM of the LS spine showed nl.extension painful  flexionnl   Straight leg raise is negative at 60 degrees on bilateral.  L4 :toe walk nl patellar reflux nl medial foot sensation nl  L5: dorsiflexion of the big toe nl,mid foot sensation decrase on the Rt   S1: heel walk nl, Abdiel reflex not done, lateral " sensation of the foot normal     Peripheral pulses are palpable.           ASSESSMENT/PLAN:             1. Compression fracture of T12 vertebra (H)  Symptoms are worsening, will repeat Xray of the LS spine including T12.  Also refer back to back speciliast   - ORTHO  REFERRAL  - XR Lumbar Spine 2/3 Views; Future    2. Closed fracture of sacrum, unspecified portion of sacrum, sequela  Repeat   - XR Sacrum and Coccyx 2 Views; Future    3. Hypothyroidism due to Hashimoto's thyroiditis  Stable, check TSH today.  - **TSH with free T4 reflex FUTURE anytime        Manny Martínez MD  [facility]

## 2019-02-01 NOTE — TELEPHONE ENCOUNTER
Medication is being filled for 1 time refill only due to: Note to pharmacy  Lab due.  30 days only to allow time for appointment and lab processing.   Pt is scheduled to see Dr. Martínez today.   Minh Desir RN

## 2019-02-01 NOTE — TELEPHONE ENCOUNTER
Thyroid is better, but we need to increase the dose to 125 mcg daily.(assuming that she was taking 100 mcg daily) I will send the new dose, recheck with me in 3 months.    Manny Martínez MD  [facility]  [phone]

## 2019-02-04 RX ORDER — LEVOTHYROXINE SODIUM 125 UG/1
125 TABLET ORAL DAILY
Qty: 90 TABLET | Refills: 3 | Status: SHIPPED | OUTPATIENT
Start: 2019-02-04 | End: 2019-05-01

## 2019-02-06 ENCOUNTER — OFFICE VISIT (OUTPATIENT)
Dept: PALLIATIVE MEDICINE | Facility: CLINIC | Age: 54
End: 2019-02-06
Payer: COMMERCIAL

## 2019-02-06 VITALS
SYSTOLIC BLOOD PRESSURE: 125 MMHG | BODY MASS INDEX: 21.92 KG/M2 | WEIGHT: 116 LBS | OXYGEN SATURATION: 92 % | HEART RATE: 50 BPM | DIASTOLIC BLOOD PRESSURE: 74 MMHG

## 2019-02-06 DIAGNOSIS — S22.080A COMPRESSION FRACTURE OF T12 VERTEBRA (H): ICD-10-CM

## 2019-02-06 DIAGNOSIS — S32.010A CLOSED COMPRESSION FRACTURE OF FIRST LUMBAR VERTEBRA, INITIAL ENCOUNTER: ICD-10-CM

## 2019-02-06 DIAGNOSIS — M79.18 MYOFASCIAL PAIN: Primary | ICD-10-CM

## 2019-02-06 PROCEDURE — 99205 OFFICE O/P NEW HI 60 MIN: CPT | Performed by: PHYSICAL MEDICINE & REHABILITATION

## 2019-02-06 ASSESSMENT — PAIN SCALES - GENERAL: PAINLEVEL: MODERATE PAIN (5)

## 2019-02-06 NOTE — PATIENT INSTRUCTIONS
Thank you for coming to [facility] for your care. It is my goal to partner with you to help you reach your optimal state of health.     You were seen today for your back pain. As discussed during your visit these are the recommendations:    1. Medications:   - Continue with tylenol as needed since this has been helpful. You can take up to 3,000 mg per day.   - You can also take Advil as needed. Take this with food to be less irritating on the stomach. Do not exceed 2,400 mg per day.   - Try using topical over the counter medications including lidocaine patches (salon pas or aspercreme), icy/hot, bengay...  - We can consider addition of a muscle relaxant if the tylenol and Advil stop being as effective for you  2. Therapies: Continue with your physical therapy.   3. Procedures: None  4. Imaging/Diagnostic Studies: None  5. Other:   - An order was placed to try acupuncture.  You can check with your insurance company to see if this will be covered.   - You can order a theracane online to see if it provides some benefit since you feel that massaging the area is helpful.  6. Follow up: 8-10 weeks in clinic  ----------------------------------------------------------------  Clinic Number:  [phone]   Call this number with any questions about your care and for scheduling assistance. Calls are returned Monday through Friday between 8 AM and 4:30 PM. We usually get back to you within 2 business days depending on the issue/request.       Medication refills:    For non-narcotic medications, call your pharmacy directly to request a refill. The pharmacy will contact the [facility] for authorization. Please allow 3-4 days for these refills to be processed.     For narcotic refills, call the clinic number or send a [employer] message. Please contact us 7-10 days before your refill is due. The message MUST include the name of the specific medication(s) requested and how you would like to receive the  prescription(s). The options are as follows:    Pain Clinic staff can mail the prescription to your pharmacy. Please tell us the name of the pharmacy.    You may pick the prescription up at the Pain Clinic (tell us the location) or during a clinic visit with your pain provider    Pain Clinic staff can deliver the prescription to the [facility] pharmacy in the clinic building. Please tell us the location.      We believe regular attendance is key to your success in our program.    Any time you are unable to keep your appointment we ask that you call us at least 24 hours in advance to let us know. This will allow us to offer the appointment time to another patient.

## 2019-02-06 NOTE — PROGRESS NOTES
[facility]    Date of visit: 2/6/2019    Primary Care Provider: Dr. Manny Martínez    Reason for consultation:    Bhavani Calderon is a 53 year old female who is seen in consultation today at the request of her primary care physician, Manny Martínez.    Consultation and Evaluation for: Medical spine evaluation    Review of [place] Prescription Monitoring Program (): Today I have also reviewed the patient's history of controlled substance use, as provided by [place] licensed pharmacies and prescriber dispensers.     Review of Pain Questionnaire: Please see the [facility] health questionnaire, which the patient completed and reviewed with me in detail.    Review of Electronic Chart: Today I have also reviewed available medical information in the patient's medical record at [facility] ([facility]), including relevant provider notes, laboratory work, and imaging.     Chief Complaint:    Back pain    Pain history:  Bhavani Calderon is a 53 year old female with a history significant for compression fracture of T12 and L1 after a fall on 7/9/2018. She was seen by Neurosurgery and was put in a brace. She states the brace was helpful in managing pain. She has been out of the brace since October 2018. At her last Neurosurgery visit in October she was cleared of any activity restrictions. Recent lumbar imaging shows that her fractures are healed. She has been attending PT and initially she had some improvement in pain but now has been having increased pain again.     She states that now she feels like she has the same level of pain she had in July when she had the fractures. The pain is located in the mid back, right > left. It radiates down from the right mid back to the right waist. No symptoms into the legs. The pain is sharp in quality. It is aggravated with activities such as prolonged walking and carrying heavy items with her right hand, reaching overhead. Pain is relieved with laying  down and relaxing. It tends to be worse in the evenings and night.  Pain rating: intensity ranges from 0/10 to 8/10, and Averages 6/10 on a 0-10 scale.    Denies red flags including: bowel or bladder symptoms, fever, chills, saddle anesthesia, profound motor loss, history of cancer, history of immune compromise, weight loss.     Current medication treatments include:  Tylenol 325 mg 1 tablet twice per week - H  Advil 200 mg occasionally - H     Pain medications are being prescribed by NA.    Previous medication treatments included:  None    Other treatments have included:  Bhavani Calderon has not been seen at a pain clinic in the past.    Behavioral interventions: None  PT: Has been going to PT but has not noticed benefit. Was somewhat helpful for a time but then pain returned to baseline.  Manual Medicine: none  Interventional: none  Acupuncture: none  TENs Unit: none  Surgeries: None    Past Medical History:  Past Medical History:   Diagnosis Date     Acquired hypothyroidism 1/21/2016     Grave's disease     High TSI titer, s/p I-131May 2009     Hypothyroidism due to Hashimoto's thyroiditis 3/21/2017     Menopausal state      Rotator cuff syndrome 7/16/2014     Seasonal allergic rhinitis 4/11/2014     Past Surgical History:  Past Surgical History:   Procedure Laterality Date     COLONOSCOPY N/A 4/7/2017    Procedure: COLONOSCOPY;  Surgeon: Christos Pena MD;  Location:  GI     Medications:  Current Outpatient Medications   Medication Sig Dispense Refill     alendronate (FOSAMAX) 70 MG tablet Take 1 tablet (70 mg) by mouth with 8oz water every 7 days 30 minutes before breakfast and remain upright during this time. 12 tablet 3     levothyroxine (SYNTHROID/LEVOTHROID) 125 MCG tablet Take 1 tablet (125 mcg) by mouth daily 90 tablet 3     Allergies:     Allergies   Allergen Reactions     Penicillins      Social History:  Home situation: Lives in [place], [place] with son.  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 Works 10 hours per day.   Tobacco use: Never smoker  Alcohol use: None  Drug use: None    Chemical dependency: The patient denies any history of treatment for alcohol or drug abuse.    Family history:  Family History   Problem Relation Age of Onset     Gastrointestinal Disease Father         hepatitis, unclear etiology, may be autoimmune     Gastrointestinal Disease Brother         hepatitis, unclear etiology     Diabetes Other      Diagnostic Tests:  Lumbar MRI completed on 7/24/2018 showed:  \" FINDINGS: Plain films dated 7/10/2018 confirm 5 functional lumbar  vertebral segments. Multiple sacral nerve root sheath cysts are seen,  the largest measuring up to 2.2 cm. These extend through the S2-3  level and are important only if caudal approach lumbar epidural  steroid injection is considered. The caudal thecal sac contents  otherwise appear intrinsically normal.     There are compression type fractures without wedge deformity involving  the inferior aspect of T12 and L1 associated with moderate bone marrow  edema. The fracture line extends only through the anterior half of  each vertebral body (images 4 through 8 of series 2, 3 and 4). No  retropulsion of bony elements. No other acute bony abnormality.     T11-12: Normal.     T12-L1: Very early signal loss and mild posterior disc bulging without  impingement on neural structures.     L1-2: Normal signal intensity and no disc space narrowing but there is  minimal posterior disc bulging with no disc protrusion or central or  foraminal stenosis. Posterior facets are normal.     L2-3: Very early signal loss and no other abnormality.     L3-4: Signal loss, anterior annular fissuring and minimal, if any,  posterior disc bulging without disc protrusion or central or foraminal  stenosis. Posterior facets are normal.     L4-5: Early signal loss without disc space narrowing or " "disc  bulge/protrusion. No central or foraminal stenosis. Early posterior  facet degenerative change on the right.     L5-S1: Signal loss, mild disc space narrowing and no disc  bulge/protrusion or central stenosis. Minimal left foraminal stenosis  and the right foramen is unremarkable. Posterior facets show early  degenerative change bilaterally.     Visualized paraspinal soft tissues are unremarkable.                                                                      IMPRESSION:   1. Acute/subacute incomplete compression fractures without wedge  deformity at the anterior-inferior aspects of T12 and L1.  2. Prominent sacral nerve root sheath cysts as described above.  3. Multilevel early degenerative disc disease with no acute disc  protrusion or significant central or foraminal stenosis.\"    X-ray of Lumbar spine was completed on 2/1/2019 which showed:  \"IMPRESSION: Five functional lumbar vertebral segments. Very subtle  degenerative anterolisthesis L4 on L5 and L5 on S1 without significant  change. Alignment is otherwise normal. Very subtle sclerotic change in  the anteroinferior T12 and L1 vertebral bodies is even less  conspicuous than on the prior study and correlates with  compression-type fracture without wedge deformity seen on MRI study of  7/24/2018. At this time, these fractures are likely healed. No new  bony abnormality or significant degenerative disc space narrowing.\"    X-ray Sacrum and Coccyx was completed on 2/1/2019 which showed:  \"FINDINGS:  No fracture or osseous lesion is seen. The previously  suspected nondisplaced fracture of the mid sacrum is no longer  demonstrated. If this was a fracture it has healed. The joint spaces  are well preserved. No soft tissue pathology is seen.\"    Review of Systems:    POSTIVE IN BOLD  GENERAL: fever/chills, fatigue, general unwell feeling, weight gain/loss.  HEAD/EYES:  headache, dizziness, or vision changes.    EARS/NOSE/THROAT:  Nosebleeds, hearing loss, " sinus infection, earache, tinnitus.  IMMUNE:  Allergies, cancer, immune deficiency, or infections.  SKIN:  Urticaria, rash, hives  HEME/Lymphatic:   anemia, easy bruising, easy bleeding.  RESPIRATORY:  cough, wheezing, or shortness of breath  CARDIOVASCULAR/Circulation:  Extremity edema, syncope, hypertension, tachycardia, or angina.  GASTROINTESTINAL:  abdominal pain, nausea/emesis, diarrhea, constipation,  hematochezia, or melena.  ENDOCRINE:  Diabetes, steroid use,  thyroid disease or osteoporosis.  MUSCULOSKELETAL: neck pain, back pain, arthralgia, arthritis, or gout.  GENITOURINARY:  frequency, urgency, dysuria, difficulty voiding, hematuria or incontinence.  NEUROLOGIC:  weakness, numbness, paresthesias, seizure, tremor, stroke or memory loss.  PSYCHIATRIC:  depression, anxiety, stress, suicidal thoughts or mood swings.     Physical Exam:  Vitals:    02/06/19 0749   BP: 125/74   Pulse: 50   SpO2: 92%   Weight: 52.6 kg (116 lb)     Exam:  Constitutional: healthy, alert, active and no distress  Head: normocephalic. Atraumatic.   Eyes: no redness or jaundice noted   ENT: oropharnx normal.  MMM.  Neck supple.    Respiratory: breathing unlabored on room air  Gastrointestinal: soft, non-tender  : deferred  Skin: no suspicious lesions or rashes  Psychiatric: mentation appears normal and affect normal/bright    Musculoskeletal exam:  Gait/Station/Posture: Normal  Cervical spine: Normal ROM with flexion, extension and lateral rotation  Thoracic spine: Tender to palpation on right thoracic paraspinals and right sandra-scapular musculature. Hypertonicity noted in the paraspinals.  Lumbar spine: Normal ROM in flexion, extension and lateral rotation to both sides. Tender to palpation in lumbar paraspinals bilaterally and some mild tenderness in QL on left > right  Straight leg exam: negative bilaterally  Sacroiliac (SI) joint tenderness: none  Bilateral hip motion without discomfort     Neurologic exam:  Motor:  5/5 UE and  LE strength   Biceps:     R:  2/4 L: 2/4   Brachioradialis   R:  2/4 L: 2/4   Triceps:  R:  2/4 L: 2/4   Patella:  R:  2/4 L: 2/4   Achilles:  R:  2/4 L: 2/4   Sensory:  (upper and lower extremities):   Light touch: normal    Allodynia: absent    Hyperalgesia: absent     Assessment:  Bhavani Calderon is a 53 year old female with a past medical history significant for osteopoerosis, compression fracture of T12 and L1 vertebrae, hypothyroidism who presents with complaints of mid back pain.      1. Mid back pain: Bhavani has ongoing mid back pain after having vertebral compression fractures at T12 and L1 after a fall in July 2018. Fractures are now healed. Pain is mainly located in the right mid back and there is a large myofascial component to the pain. On exam she has hypertonicity in the right thoracic paraspinals and is tender to palpation of the paraspinals, sandra-scapular musculature. This reproduces her typical pain. She also has myofascial pain in the lumbar spine which is not as significant. She has no radicular symptoms. Neuro exam is normal.     Plan:  Diagnosis reviewed, treatment options addressed, and risks/benefits discussed. The patient was involved in shared decision making regarding the plan as laid out below.    1. Education: The patient was educated as to the natural history of their disorder.  Reassurance was given and the patient was encouraged to engage in activity and movement as able.  2. Physical Therapy:  Continue with PT and incorporate working on the right sandra-scapular musculature.   3. Diagnostic Studies:  No new imaging needed at this time.  4. Medication Management:    1. Continue Tylenol prn, not exceeding 3,000 mg daily  2. Continue Advil prn and make sure to take with food. Do not exceed 2,400 mg daily.  3. Use OTC topicals including lidocaine patches, icy/hot, Bengay, etc.   4. Can consider addition of a muscle relaxant.  5. Further procedures recommended: None recommended at this  time.  6. Complementary treatments: Order placed for acupuncture. She will check with her insurance to see if it is covered prior to scheduling.   7. Other:  1. Discussed trying a theracane since massage is helpful.   2. Can consider trying a TENS unit in the future.  8. Follow up: 8-10 weeks in clinic    Total time spent face to face was 60 minutes and more than 50% of face to face time was spent in counseling and/or coordination of care regarding the diagnosis and recommendations above.    Florina Justice MD  Medical Spine Specialist  [facility]

## 2019-02-15 ENCOUNTER — THERAPY VISIT (OUTPATIENT)
Dept: PHYSICAL THERAPY | Facility: CLINIC | Age: 54
End: 2019-02-15
Payer: COMMERCIAL

## 2019-02-15 DIAGNOSIS — M51.369 LUMBAR DEGENERATIVE DISC DISEASE: ICD-10-CM

## 2019-02-15 PROCEDURE — 97110 THERAPEUTIC EXERCISES: CPT | Mod: GP | Performed by: PHYSICAL THERAPIST

## 2019-02-15 PROCEDURE — 97112 NEUROMUSCULAR REEDUCATION: CPT | Mod: GP | Performed by: PHYSICAL THERAPIST

## 2019-02-22 ENCOUNTER — THERAPY VISIT (OUTPATIENT)
Dept: PHYSICAL THERAPY | Facility: CLINIC | Age: 54
End: 2019-02-22
Payer: COMMERCIAL

## 2019-02-22 DIAGNOSIS — M51.369 LUMBAR DEGENERATIVE DISC DISEASE: ICD-10-CM

## 2019-02-22 PROCEDURE — 97110 THERAPEUTIC EXERCISES: CPT | Mod: GP | Performed by: PHYSICAL THERAPIST

## 2019-02-22 PROCEDURE — 97112 NEUROMUSCULAR REEDUCATION: CPT | Mod: GP | Performed by: PHYSICAL THERAPIST

## 2019-02-22 PROCEDURE — 97140 MANUAL THERAPY 1/> REGIONS: CPT | Mod: GP | Performed by: PHYSICAL THERAPIST

## 2019-03-01 ENCOUNTER — THERAPY VISIT (OUTPATIENT)
Dept: PHYSICAL THERAPY | Facility: CLINIC | Age: 54
End: 2019-03-01
Payer: COMMERCIAL

## 2019-03-01 DIAGNOSIS — M51.369 LUMBAR DEGENERATIVE DISC DISEASE: ICD-10-CM

## 2019-03-01 PROCEDURE — 97110 THERAPEUTIC EXERCISES: CPT | Mod: GP | Performed by: PHYSICAL THERAPIST

## 2019-03-01 PROCEDURE — 97112 NEUROMUSCULAR REEDUCATION: CPT | Mod: GP | Performed by: PHYSICAL THERAPIST

## 2019-05-01 DIAGNOSIS — E06.3 HYPOTHYROIDISM DUE TO HASHIMOTO'S THYROIDITIS: ICD-10-CM

## 2019-05-01 RX ORDER — LEVOTHYROXINE SODIUM 125 UG/1
125 TABLET ORAL DAILY
Qty: 30 TABLET | Refills: 3 | Status: SHIPPED | OUTPATIENT
Start: 2019-05-01 | End: 2019-09-06

## 2019-05-01 NOTE — TELEPHONE ENCOUNTER
"2nd Request    Requested Prescriptions   Pending Prescriptions Disp Refills     levothyroxine (SYNTHROID/LEVOTHROID) 125 MCG tablet 90 tablet 3     Sig: Take 1 tablet (125 mcg) by mouth daily    Last Written Prescription Date:  2/4/19-2/4/20  Last Fill Quantity: 90 tablet,  # refills: 3   Last office visit: 2/1/2019 with prescribing provider:  Mauricio   Future Office Visit:           Thyroid Protocol Failed - 5/1/2019  8:25 AM        Failed - Normal TSH on file in past 12 months     Recent Labs   Lab Test 02/01/19  1000   TSH 6.87*              Passed - Patient is 12 years or older        Passed - Recent (12 mo) or future (30 days) visit within the authorizing provider's specialty     Patient had office visit in the last 12 months or has a visit in the next 30 days with authorizing provider or within the authorizing provider's specialty.  See \"Patient Info\" tab in inbasket, or \"Choose Columns\" in Meds & Orders section of the refill encounter.              Passed - Medication is active on med list        Passed - No active pregnancy on record     If patient is pregnant or has had a positive pregnancy test, please check TSH.          Passed - No positive pregnancy test in past 12 months     If patient is pregnant or has had a positive pregnancy test, please check TSH.            "

## 2019-05-01 NOTE — TELEPHONE ENCOUNTER
Routing refill request to provider for review/approval because:  Labs out of range:  TSh  No comments on the labs from Feb  Barbara Cohn RN, BSN

## 2019-05-01 NOTE — LETTER
[address] [address] [phone]  May 3, 2019    Bhavani Calderon  [address] [facility] [address]    Dear Bhavani,    I care about your health and have reviewed your health plan. I have reviewed your medical conditions, medication list, and lab results and am making recommendations based on this review, to better manage your health.    You are in particular need of attention regarding:  -thyroid    I am recommending that you:  {recommendations:-schedule a LAB ONLY APPOINTMENT to recheck your: TSH (thyroid test) test within the next 1-4 weeks.    Here is a list of Health Maintenance topics that are due now or due soon:  Health Maintenance Due   Topic Date Due     HIV SCREEN (SYSTEM ASSIGNED)  11/14/1984     PREVENTIVE CARE VISIT  06/24/2016     ZOSTER IMMUNIZATION (1 of 2) 11/14/2016     PAP Q3 YR  06/24/2018     PHQ-2  01/01/2019     MAMMO SCREEN Q2 YR (SYSTEM ASSIGNED)  03/22/2019       Please call us at [phone] (or use [employer]) to address the above recommendations.     Thank you for trusting [facility] and we appreciate the opportunity to serve you.  We look forward to supporting your healthcare needs in the future.    Healthy Regards,    Manny Martínez MD

## 2019-05-01 NOTE — TELEPHONE ENCOUNTER
Please call patient: needs lab only for her thyroid, so we can renew her prescription.  Manny Martínez MD  [facility]  [phone]

## 2019-08-22 DIAGNOSIS — J30.2 CHRONIC SEASONAL ALLERGIC RHINITIS: ICD-10-CM

## 2019-08-22 RX ORDER — FLUTICASONE PROPIONATE 50 MCG
2 SPRAY, SUSPENSION (ML) NASAL DAILY
Qty: 16 G | Refills: 1 | Status: SHIPPED | OUTPATIENT
Start: 2019-08-22 | End: 2020-02-13

## 2019-08-22 NOTE — TELEPHONE ENCOUNTER
Refill request for Flonase.  Was DC'd as med rec clean up.  Are you OK refilling?  Jeanine Enamorado RN

## 2019-09-06 DIAGNOSIS — E06.3 HYPOTHYROIDISM DUE TO HASHIMOTO'S THYROIDITIS: ICD-10-CM

## 2019-09-07 NOTE — TELEPHONE ENCOUNTER
"Requested Prescriptions   Pending Prescriptions Disp Refills     levothyroxine (SYNTHROID/LEVOTHROID) 125 MCG tablet [Pharmacy Med Name: Levothyroxine Sodium Oral Tablet 125 MCG] 30 tablet 2     Sig: Take 1 tablet (125 mcg) by mouth daily.       Thyroid Protocol Failed - 9/6/2019  7:00 AM        Failed - Normal TSH on file in past 12 months     Recent Labs   Lab Test 02/01/19  1000   TSH 6.87*              Passed - Patient is 12 years or older        Passed - Recent (12 mo) or future (30 days) visit within the authorizing provider's specialty     Patient had office visit in the last 12 months or has a visit in the next 30 days with authorizing provider or within the authorizing provider's specialty.  See \"Patient Info\" tab in inbasket, or \"Choose Columns\" in Meds & Orders section of the refill encounter.              Passed - Medication is active on med list        Passed - No active pregnancy on record     If patient is pregnant or has had a positive pregnancy test, please check TSH.          Passed - No positive pregnancy test in past 12 months     If patient is pregnant or has had a positive pregnancy test, please check TSH.              Last Written Prescription Date:  5/1/2019  Last Fill Quantity: 30,  # refills: 3   Last office visit: 2/1/2019 with prescribing provider:  Dr Martínez   Future Office Visit:      "

## 2019-09-09 RX ORDER — LEVOTHYROXINE SODIUM 125 UG/1
125 TABLET ORAL DAILY
Qty: 30 TABLET | Refills: 0 | Status: SHIPPED | OUTPATIENT
Start: 2019-09-09 | End: 2019-09-18

## 2019-09-09 NOTE — TELEPHONE ENCOUNTER
Routing refill request to provider for review/approval because:  Labs out of range:  TSH  Patient needs to be seen because:  Due for follow up labs that have been ordered.  Patient was notified via letter at last refill and has not followed up  Darby GARCIA RN   [facility] [place]

## 2019-09-09 NOTE — TELEPHONE ENCOUNTER
Via interpretor services - Left message for patient to call clinic back regarding Dr. Martínez's message.      Salome Jonathan, CMA

## 2019-09-09 NOTE — TELEPHONE ENCOUNTER
Please call, needs to be seen.  Given 1 month only.  Manny Martínez MD  [facility]  [phone]  r

## 2019-09-17 DIAGNOSIS — E06.3 HYPOTHYROIDISM DUE TO HASHIMOTO'S THYROIDITIS: ICD-10-CM

## 2019-09-17 PROCEDURE — 84443 ASSAY THYROID STIM HORMONE: CPT | Performed by: FAMILY MEDICINE

## 2019-09-17 PROCEDURE — 36415 COLL VENOUS BLD VENIPUNCTURE: CPT | Performed by: FAMILY MEDICINE

## 2019-09-17 PROCEDURE — 84439 ASSAY OF FREE THYROXINE: CPT | Performed by: FAMILY MEDICINE

## 2019-09-18 ENCOUNTER — TELEPHONE (OUTPATIENT)
Dept: FAMILY MEDICINE | Facility: CLINIC | Age: 54
End: 2019-09-18

## 2019-09-18 DIAGNOSIS — E06.3 HYPOTHYROIDISM DUE TO HASHIMOTO'S THYROIDITIS: ICD-10-CM

## 2019-09-18 LAB
T4 FREE SERPL-MCNC: 1.74 NG/DL (ref 0.76–1.46)
TSH SERPL DL<=0.005 MIU/L-ACNC: <0.01 MU/L (ref 0.4–4)

## 2019-09-18 RX ORDER — LEVOTHYROXINE SODIUM 100 UG/1
100 TABLET ORAL DAILY
Qty: 90 TABLET | Refills: 1 | Status: SHIPPED | OUTPATIENT
Start: 2019-09-18 | End: 2019-12-20

## 2019-09-18 NOTE — TELEPHONE ENCOUNTER
Please call, her thyroid is high so we should cut down the dose down to 100 mcg daily.  Recheck labs in 3 months please.  Manny Martínez MD  [facility]  [phone]  r

## 2019-09-18 NOTE — TELEPHONE ENCOUNTER
Placed call to patient using [facility]  message left to return call to triage nurse     Anabella Tucker, Registered Nurse   [facility]

## 2019-10-08 DIAGNOSIS — E06.3 HYPOTHYROIDISM DUE TO HASHIMOTO'S THYROIDITIS: ICD-10-CM

## 2019-10-08 RX ORDER — LEVOTHYROXINE SODIUM 125 UG/1
TABLET ORAL
Qty: 30 TABLET | Refills: 0 | OUTPATIENT
Start: 2019-10-08

## 2019-10-08 NOTE — TELEPHONE ENCOUNTER
"Last Written Prescription Date:  9/18/19  Last Fill Quantity: 90 tablet,  # refills: 1   Last office visit: 2/1/2019 with prescribing provider:  Mauricio   Future Office Visit:      Requested Prescriptions   Pending Prescriptions Disp Refills     levothyroxine (SYNTHROID/LEVOTHROID) 125 MCG tablet [Pharmacy Med Name: Levothyroxine Sodium Oral Tablet 125 MCG] 30 tablet 0     Sig: TAKE ONE TABLET BY MOUTH ONE TIME DAILY       Thyroid Protocol Failed - 10/8/2019  7:01 AM        Failed - Normal TSH on file in past 12 months     Recent Labs   Lab Test 09/17/19  1606   TSH <0.01*              Passed - Patient is 12 years or older        Passed - Recent (12 mo) or future (30 days) visit within the authorizing provider's specialty     Patient has had an office visit with the authorizing provider or a provider within the authorizing providers department within the previous 12 mos or has a future within next 30 days. See \"Patient Info\" tab in inbasket, or \"Choose Columns\" in Meds & Orders section of the refill encounter.              Passed - Medication is active on med list        Passed - No active pregnancy on record     If patient is pregnant or has had a positive pregnancy test, please check TSH.          Passed - No positive pregnancy test in past 12 months     If patient is pregnant or has had a positive pregnancy test, please check TSH.            "

## 2019-11-03 ENCOUNTER — HEALTH MAINTENANCE LETTER (OUTPATIENT)
Age: 54
End: 2019-11-03

## 2019-11-14 ENCOUNTER — NURSE TRIAGE (OUTPATIENT)
Dept: FAMILY MEDICINE | Facility: CLINIC | Age: 54
End: 2019-11-14

## 2019-11-14 NOTE — TELEPHONE ENCOUNTER
Pt calls, also talked to daughter to help interpret, see note below, recommend UC today for eval, symptoms including orange urine x 3 weeks, some mild left lower ovarian pain, will go to UC to day for eval, aware may need ER if needs imaging    Additional Information    Negative: Passed out (i.e., fainted, collapsed and was not responding)    Negative: Shock suspected (e.g., cold/pale/clammy skin, too weak to stand, low BP, rapid pulse)    Negative: Visible sweat on face or sweat is dripping down    Negative: Chest pain    Negative: SEVERE abdominal pain (e.g., excruciating)    Negative: Pain lasting > 10 minutes and over 50 years old    Negative: Pain lasting > 10 minutes and over 40 years old and associated chest, arm, neck, upper back, or jaw pain    Negative: Pain lasting > 10 minutes and over 35 years old and at least one cardiac risk factor    Negative: Pain lasting > 10 minutes and history of heart disease (i.e., heart attack, bypass surgery, angina, angioplasty, CHF)    Negative: Recent injury to the abdomen    Negative: Vomiting red blood or black (coffee ground) material    Negative: Bloody, black, or tarry bowel movements    Negative: Pregnant > 24 weeks and hand or face swelling    Negative: Constant abdominal pain lasting > 2 hours    Negative: Vomiting bile (green color)    Negative: Patient sounds very sick or weak to the triager    Negative: Vomiting and abdomen looks much more swollen than usual    Negative: White of the eyes have turned yellow (i.e.,  jaundice)    Negative: Fever > 103 F (39.4 C)    Negative: Fever > 101 F (38.3 C) and over 60 years of age    Negative: Fever > 100.0 F (37.8 C) and has diabetes mellitus or a weak immune system (e.g., HIV positive, cancer chemotherapy, organ transplant, splenectomy, chronic steroids)    Negative: Fever > 100.0 F (37.8 C) and bedridden (e.g., nursing home patient, stroke, chronic illness, recovering from surgery)    Negative: Age > 60 years    Patient  "wants to be seen    Answer Assessment - Initial Assessment Questions  1. LOCATION: \"Where does it hurt?\"       Left lower abdominal area  2. RADIATION: \"Does the pain shoot anywhere else?\" (e.g., chest, back)      No  3. ONSET: \"When did the pain begin?\" (e.g., minutes, hours or days ago)       2 weeks on and off  4. SUDDEN: \"Gradual or sudden onset?\"       Getting worse  5. PATTERN \"Does the pain come and go, or is it constant?\"     - If intermittent: \"How long does it last?\" \"Do you have pain now?\"      (Note: Intermittent means the pain goes away completely between bouts)      Comes and goes, lasts about 10 minutes  6. SEVERITY: \"How bad is the pain?\"  (e.g., Scale 1-10; mild, moderate, or severe)      - MODERATE (4-7): interferes with normal activities or awakens from sleep, tender to touch   7. RECURRENT SYMPTOM: \"Have you ever had this type of abdominal pain before?\" If so, ask: \"When was the last time?\" and \"What happened that time?\"       no  8. AGGRAVATING FACTORS: \"Does anything seem to cause this pain?\" (e.g., foods, stress, alcohol)      No   9. CARDIAC SYMPTOMS: \"Do you have any of the following symptoms: chest pain, difficulty breathing, sweating, nausea?\"      No   10. OTHER SYMPTOMS: \"Do you have any other symptoms?\" (e.g., fever, vomiting, diarrhea)        No  11. PREGNANCY: \"Is there any chance you are pregnant?\" \"When was your last menstrual period?\"        No, 10 years ago    Protocols used: ABDOMINAL PAIN - UPPER-A-OH    Sarah Van RN, BSN  Message handled by Nurse Triage.    "

## 2019-11-15 ENCOUNTER — APPOINTMENT (OUTPATIENT)
Dept: CT IMAGING | Facility: CLINIC | Age: 54
End: 2019-11-15
Attending: EMERGENCY MEDICINE
Payer: COMMERCIAL

## 2019-11-15 ENCOUNTER — APPOINTMENT (OUTPATIENT)
Dept: ULTRASOUND IMAGING | Facility: CLINIC | Age: 54
End: 2019-11-15
Attending: EMERGENCY MEDICINE
Payer: COMMERCIAL

## 2019-11-15 ENCOUNTER — HOSPITAL ENCOUNTER (EMERGENCY)
Facility: CLINIC | Age: 54
Discharge: HOME | End: 2019-11-15
Attending: EMERGENCY MEDICINE | Admitting: EMERGENCY MEDICINE
Payer: COMMERCIAL

## 2019-11-15 ENCOUNTER — OFFICE VISIT (OUTPATIENT)
Dept: URGENT CARE | Facility: URGENT CARE | Age: 54
End: 2019-11-15
Payer: COMMERCIAL

## 2019-11-15 VITALS
OXYGEN SATURATION: 99 % | SYSTOLIC BLOOD PRESSURE: 102 MMHG | WEIGHT: 110.6 LBS | DIASTOLIC BLOOD PRESSURE: 60 MMHG | HEART RATE: 52 BPM | BODY MASS INDEX: 20.88 KG/M2 | TEMPERATURE: 98 F | HEIGHT: 61 IN

## 2019-11-15 VITALS
WEIGHT: 110.67 LBS | OXYGEN SATURATION: 98 % | SYSTOLIC BLOOD PRESSURE: 121 MMHG | HEART RATE: 50 BPM | DIASTOLIC BLOOD PRESSURE: 61 MMHG | BODY MASS INDEX: 20.91 KG/M2 | RESPIRATION RATE: 18 BRPM | TEMPERATURE: 98.1 F

## 2019-11-15 DIAGNOSIS — R10.2 PELVIC PAIN: Primary | ICD-10-CM

## 2019-11-15 DIAGNOSIS — N83.202 OVARIAN CYST, LEFT: ICD-10-CM

## 2019-11-15 LAB
ALBUMIN SERPL-MCNC: 3.7 G/DL (ref 3.4–5)
ALBUMIN UR-MCNC: NEGATIVE MG/DL
ALP SERPL-CCNC: 133 U/L (ref 40–150)
ALT SERPL W P-5'-P-CCNC: 36 U/L (ref 0–50)
ANION GAP SERPL CALCULATED.3IONS-SCNC: 5 MMOL/L (ref 3–14)
APPEARANCE UR: CLEAR
AST SERPL W P-5'-P-CCNC: 35 U/L (ref 0–45)
BASOPHILS # BLD AUTO: 0.1 10E9/L (ref 0–0.2)
BASOPHILS NFR BLD AUTO: 1.1 %
BILIRUB SERPL-MCNC: 0.3 MG/DL (ref 0.2–1.3)
BILIRUB UR QL STRIP: NEGATIVE
BUN SERPL-MCNC: 13 MG/DL (ref 7–30)
CALCIUM SERPL-MCNC: 9.3 MG/DL (ref 8.5–10.1)
CHLORIDE SERPL-SCNC: 108 MMOL/L (ref 94–109)
CO2 SERPL-SCNC: 27 MMOL/L (ref 20–32)
COLOR UR AUTO: YELLOW
CREAT SERPL-MCNC: 0.48 MG/DL (ref 0.52–1.04)
DIFFERENTIAL METHOD BLD: NORMAL
EOSINOPHIL # BLD AUTO: 0.1 10E9/L (ref 0–0.7)
EOSINOPHIL NFR BLD AUTO: 0.8 %
ERYTHROCYTE [DISTWIDTH] IN BLOOD BY AUTOMATED COUNT: 12.6 % (ref 10–15)
GFR SERPL CREATININE-BSD FRML MDRD: >90 ML/MIN/{1.73_M2}
GLUCOSE SERPL-MCNC: 83 MG/DL (ref 70–99)
GLUCOSE UR STRIP-MCNC: NEGATIVE MG/DL
HCG SERPL QL: NEGATIVE
HCT VFR BLD AUTO: 42.8 % (ref 35–47)
HGB BLD-MCNC: 14.4 G/DL (ref 11.7–15.7)
HGB UR QL STRIP: ABNORMAL
IMM GRANULOCYTES # BLD: 0 10E9/L (ref 0–0.4)
IMM GRANULOCYTES NFR BLD: 0.2 %
KETONES UR STRIP-MCNC: NEGATIVE MG/DL
LEUKOCYTE ESTERASE UR QL STRIP: NEGATIVE
LYMPHOCYTES # BLD AUTO: 2.1 10E9/L (ref 0.8–5.3)
LYMPHOCYTES NFR BLD AUTO: 35.1 %
MCH RBC QN AUTO: 30.4 PG (ref 26.5–33)
MCHC RBC AUTO-ENTMCNC: 33.6 G/DL (ref 31.5–36.5)
MCV RBC AUTO: 91 FL (ref 78–100)
MONOCYTES # BLD AUTO: 0.5 10E9/L (ref 0–1.3)
MONOCYTES NFR BLD AUTO: 8.4 %
NEUTROPHILS # BLD AUTO: 3.3 10E9/L (ref 1.6–8.3)
NEUTROPHILS NFR BLD AUTO: 54.4 %
NITRATE UR QL: NEGATIVE
NRBC # BLD AUTO: 0 10*3/UL
NRBC BLD AUTO-RTO: 0 /100
PH UR STRIP: 5 PH (ref 5–7)
PLATELET # BLD AUTO: 233 10E9/L (ref 150–450)
POTASSIUM SERPL-SCNC: 3.8 MMOL/L (ref 3.4–5.3)
PROT SERPL-MCNC: 7.5 G/DL (ref 6.8–8.8)
RBC # BLD AUTO: 4.73 10E12/L (ref 3.8–5.2)
RBC #/AREA URNS AUTO: NORMAL /HPF
SODIUM SERPL-SCNC: 140 MMOL/L (ref 133–144)
SOURCE: ABNORMAL
SP GR UR STRIP: >1.03 (ref 1–1.03)
UROBILINOGEN UR STRIP-ACNC: 0.2 EU/DL (ref 0.2–1)
WBC # BLD AUTO: 6.1 10E9/L (ref 4–11)
WBC #/AREA URNS AUTO: NORMAL /HPF

## 2019-11-15 PROCEDURE — 84703 CHORIONIC GONADOTROPIN ASSAY: CPT | Performed by: EMERGENCY MEDICINE

## 2019-11-15 PROCEDURE — 93976 VASCULAR STUDY: CPT | Mod: XS

## 2019-11-15 PROCEDURE — 99214 OFFICE O/P EST MOD 30 MIN: CPT | Performed by: FAMILY MEDICINE

## 2019-11-15 PROCEDURE — 99285 EMERGENCY DEPT VISIT HI MDM: CPT | Mod: 25

## 2019-11-15 PROCEDURE — 81001 URINALYSIS AUTO W/SCOPE: CPT | Performed by: PHYSICIAN ASSISTANT

## 2019-11-15 PROCEDURE — 74177 CT ABD & PELVIS W/CONTRAST: CPT

## 2019-11-15 PROCEDURE — 25000125 ZZHC RX 250: Performed by: EMERGENCY MEDICINE

## 2019-11-15 PROCEDURE — 85025 COMPLETE CBC W/AUTO DIFF WBC: CPT | Performed by: EMERGENCY MEDICINE

## 2019-11-15 PROCEDURE — 25000128 H RX IP 250 OP 636: Performed by: EMERGENCY MEDICINE

## 2019-11-15 PROCEDURE — 80053 COMPREHEN METABOLIC PANEL: CPT | Performed by: EMERGENCY MEDICINE

## 2019-11-15 RX ORDER — IOPAMIDOL 755 MG/ML
500 INJECTION, SOLUTION INTRAVASCULAR ONCE
Status: COMPLETED | OUTPATIENT
Start: 2019-11-15 | End: 2019-11-15

## 2019-11-15 RX ADMIN — IOPAMIDOL 55 ML: 755 INJECTION, SOLUTION INTRAVENOUS at 15:46

## 2019-11-15 RX ADMIN — SODIUM CHLORIDE 55 ML: 9 INJECTION, SOLUTION INTRAVENOUS at 15:46

## 2019-11-15 ASSESSMENT — ENCOUNTER SYMPTOMS
DIARRHEA: 0
FEVER: 0
CONSTIPATION: 0
BACK PAIN: 1

## 2019-11-15 ASSESSMENT — PAIN SCALES - GENERAL: PAINLEVEL: MODERATE PAIN (5)

## 2019-11-15 ASSESSMENT — MIFFLIN-ST. JEOR: SCORE: 1044.06

## 2019-11-15 NOTE — PROGRESS NOTES
"SUBJECTIVE:    Bhavani Calderon is a 54 year old postmenopausal female with a history of osteoporosis and compression vertebral fracture.  She presents today for constant pain at the suprapubic region, at the bilateral pelvis, and at the lumbar spine and at the bilateral lower back for the past three weeks The pain at the bilateral pelvis has become severe, especially at night.  She also has noticed malodorous urine.  . No dysuria.  No blood in the urine.  no fevers.  No vomiting.  She has pain when twisting the torso to the left.    No recent injury.   Last menstrual period was around       Past Medical History:   Diagnosis Date     Acquired hypothyroidism 1/21/2016     Grave's disease     High TSI titer, s/p I-131May 2009     Hypothyroidism due to Hashimoto's thyroiditis 3/21/2017     Menopausal state      Rotator cuff syndrome 7/16/2014     Seasonal allergic rhinitis 4/11/2014   Osteoporosis  Compression fracture at the T12 vertebra.      Current Outpatient Medications   Medication Sig Dispense Refill     fluticasone (FLONASE) 50 MCG/ACT nasal spray Spray 2 sprays into both nostrils daily 16 g 1     levothyroxine (SYNTHROID/LEVOTHROID) 100 MCG tablet Take 1 tablet (100 mcg) by mouth daily 90 tablet 1     Social History     Tobacco Use     Smoking status: Never Smoker     Smokeless tobacco: Never Used   Substance Use Topics     Alcohol use: No     Alcohol/week: 0.0 standard drinks       ROS:   CONSTITUTIONAL:NEGATIVE for fever, chills, change in weight  INTEGUMENTARY/SKIN: NEGATIVE for worrisome rashes, moles or lesions  GI: NEGATIVE for nausea, abdominal pain, heartburn, or change in bowel habits  : positive for pelvic pain.  MUSCULOSKELETAL: positive for lumbar spine pain.    NEURO: NEGATIVE for weakness.  No numbness.      OBJECTIVE:  /60   Pulse 52   Temp 98  F (36.7  C) (Tympanic)   Ht 1.549 m (5' 1\")   Wt 50.2 kg (110 lb 9.6 oz)   LMP 03/31/2006   SpO2 99%   BMI 20.90 kg/m    GENERAL " APPEARANCE: healthy, alert and in pain.   ABDOMEN: soft, normal bowel sounds, tenderness at the bilateral pelvis and over the suprapubic region.    BACK: No CVA tenderness.  There is pain with palpation over the L5 vertebral region and over the areas of the bilateral lower back near the spine of the posterior bilateral pelvis.  There is some pain with twisting the torso to the left.    SKIN: no suspicious lesions or rashes    LAB:    Results for orders placed or performed in visit on 11/15/19   *UA reflex to Microscopic and Culture ([facility] (except [place] and [place])     Status: Abnormal   Result Value Ref Range    Color Urine Yellow     Appearance Urine Clear     Glucose Urine Negative NEG^Negative mg/dL    Bilirubin Urine Negative NEG^Negative    Ketones Urine Negative NEG^Negative mg/dL    Specific Gravity Urine >1.030 1.003 - 1.035    Blood Urine Trace (A) NEG^Negative    pH Urine 5.0 5.0 - 7.0 pH    Protein Albumin Urine Negative NEG^Negative mg/dL    Urobilinogen Urine 0.2 0.2 - 1.0 EU/dL    Nitrite Urine Negative NEG^Negative    Leukocyte Esterase Urine Negative NEG^Negative    Source Midstream Urine    Urine Microscopic     Status: None   Result Value Ref Range    WBC Urine 0 - 5 OTO5^0 - 5 /HPF    RBC Urine O - 2 OTO2^O - 2 /HPF       ASSESSMENT:   Pelvic Pain.  No evidence of hematuria nor of cystitis nor of pyelonephritis.      PLAN:  Patient will go to [facility] emergency room for further evaluation.  Possible need for Pelvic Ultrasound and/or CT of the pelvis.      Zachariah Altamirano MD

## 2019-11-15 NOTE — ED PROVIDER NOTES
"  History     Chief Complaint:  Pelvic pain    HPI supplemented with electronic chart review.    HPI   Bhavani Calderon is a 54 year old postmenopausal female with a history of osteoporosis and spinal compression fractures who presents with pelvic pain. She reports she developed intermittent \"pinch-like\" pelvic pain 3 weeks ago. She reports that this pain radiates to her back and is exacerbated by walking around. She reports that her pain has been constant throughout the last week. She reports that she was seen at [facility] in [place] this afternoon and that they referred her here for imaging and further workup. Per her chart, she received urinalysis testing at Urgent Care (see results below). The patient denies fever, diarrhea, constipation, vaginal bleeding, or vaginal discharge. She denies taking tylenol and ibuprofen to treat her pain.    From [facility] [place] on 11/15/19:   UA with Microscopic: blood: trace, o/w WNL    Allergies:  Penicillins    Medications:    Levothyroxine    Past Medical History:    Lumbar DDD  Osteoporosis  Lumbar and Thoracic compression fractures  Grave's disease  Hypothyroidism due to Hashimoto's thyroiditis    Past Surgical History:    The patient does not have any pertinent past surgical history.    Family History:    Glaucoma x2  Diabetes    Social History:  Smoking status: Never  Alcohol use: No  Patient presents to the emergency department with her daughter.  Marital Status:       Review of Systems   Constitutional: Negative for fever.   Gastrointestinal: Negative for constipation and diarrhea.   Genitourinary: Positive for pelvic pain. Negative for vaginal bleeding and vaginal discharge.   Musculoskeletal: Positive for back pain.   All other systems reviewed and are negative.      Physical Exam     Patient Vitals for the past 24 hrs:   BP Temp Temp src Pulse Heart Rate Resp SpO2 Weight   11/15/19 1815 119/71 -- -- (!) 49 -- -- 98 % --   11/15/19 1800 94/66 " -- -- (!) 49 -- -- 99 % --   11/15/19 1449 (!) 140/62 98.1  F (36.7  C) Oral -- 54 18 97 % 50.2 kg (110 lb 10.7 oz)       Physical Exam  Nursing note and vitals reviewed.  Constitutional: Cooperative.   HENT:   Mouth/Throat: Moist mucous membranes.   Eyes: EOMI, nonicteric sclera  Cardiovascular: Normal rate, regular rhythm, no murmurs, rubs, or gallops  Pulmonary/Chest: Effort normal and breath sounds normal. No respiratory distress. No wheezes. No rales.   Abdominal: Soft. Minimal LLQ TTP without guarding/rigidity, nondistended. BS present.   Musculoskeletal: Normal range of motion.   Neurological: Alert. Moves all extremities spontaneously.   Skin: Skin is warm and dry. No rash noted.   Psychiatric: Normal mood and affect.     Emergency Department Course     Imaging:  Radiographic findings were communicated with the patient who voiced understanding of the findings.  Imaging independently reviewed and agree with radiologist interpretation.     CT Abdomen pelvis with IV contrast only TRAUMA / AAA:   1.8 cm left adnexal cyst. If patient is postmenopausal, follow-up  evaluation with pelvic ultrasound should be performed.  Colonic diverticulosis without evidence of diverticulitis.    Read as per radiology.    US Pelvic, Complete w Transvaginal & Abd/Pel Duplex Limited:  Left exophytic or less likely para adnexal cyst. See guidelines below.    Read as per radiology.     Laboratory:  CMP: Glucose 83, o/w WNL (Creatinine: 0.48 (L))    CBC: WBC: 6.1, HGB: 14.4, PLT: 233    HCG qualitative: negative    Emergency Department Course:  Nursing notes and vitals reviewed. (1511) I performed an exam of the patient as documented above.     Blood drawn. This was sent to the lab for further testing, results above.    The patient was sent for a CT abdomen pelvis while in the emergency department, findings above.     1831 I rechecked the patient and discussed the results of her workup thus far.     Findings and plan explained to the  Patient. Patient discharged home with instructions regarding supportive care and reasons to return. The importance of close follow-up was reviewed.    I personally reviewed the laboratory results with the Patient and answered all related questions prior to discharge.    Impression & Plan    Medical Decision Making:  Pt presents with pelvic pain.  They look overall well.  A broad differential diagnosis was considered including colitis, appendicitis, intestinal cramping, pyelonephritis, UTI, kidney stone, constipation, diverticulitis, volvulus, ileus, obstruction, pregnancy (ectopic or intrauterine), ovarian cyst (enlarged or ruptured), ovarian torsion, PID, etc as possibilities.    The workup in the ED shows ovarian cyst.  She understands this may or not be the source of pain. Doubt PID or TOA given clear findings of cyst without signs of abscess.  No other etiology for the patients pain is found at this point and my suspicion of an intraabdominal catastrophe or other worrisome etiology is very low.  I will not therefore admit for serial exams and further workup.  Patient is hemodynamically stable in ED. Plan is home with recheck by obgyn.  Return for fevers greater than 102, increasing pain, other new symptoms develop.  Abdominal pain handout given.   She is in stable condition at the time of discharge, indications for return to the ED were discussed as well as follow up. All questions were answered and she is in agreement with the plan.    Diagnosis:    ICD-10-CM    1. Ovarian cyst, left N83.202        Disposition:  discharged to home    Janes Juany  11/15/2019   [facility] EMERGENCY DEPARTMENT  Scribe Disclosure:  I, Janes Harrington, am serving as a scribe on 11/15/2019 at 3:11 PM to personally document services performed by Vu Perez MD based on my observations and the provider's statements to me.      Vu Perez MD  11/16/19 1930

## 2019-11-15 NOTE — ED AVS SNAPSHOT
[facility]  [address]  [facility] [address]  Phone:  [phone]  Fax:  [phone]                                    Bhavani Calderon   MRN: [id number]    Department:  [facility]   Date of Visit:  11/15/2019           After Visit Summary Signature Page    I have received my discharge instructions, and my questions have been answered. I have discussed any challenges I see with this plan with the nurse or doctor.    ..........................................................................................................................................  Patient/Patient Representative Signature      ..........................................................................................................................................  Patient Representative Print Name and Relationship to Patient    ..................................................               ................................................  Date                                   Time    ..........................................................................................................................................  Reviewed by Signature/Title    ...................................................              ..............................................  Date                                               Time          22EPIC Rev 08/18

## 2019-11-20 ENCOUNTER — TELEPHONE (OUTPATIENT)
Dept: FAMILY MEDICINE | Facility: CLINIC | Age: 54
End: 2019-11-20

## 2019-11-20 NOTE — TELEPHONE ENCOUNTER
Panel Management Review      Patient has the following on her problem list: None      Composite cancer screening  Chart review shows that this patient is due/due soon for the following Mammogram  Summary:    Patient is due/failing the following:   MAMMOGRAM    Action needed:   Patient needs office visit for mammogram.    Type of outreach:    Phone, left message for patient to call back.     Questions for provider review:    None                                                                                                                                    Dunia Brown MA on 11/20/2019 at 3:44 PM       Chart routed to Care Team .

## 2019-11-25 ENCOUNTER — ANCILLARY PROCEDURE (OUTPATIENT)
Dept: MAMMOGRAPHY | Facility: CLINIC | Age: 54
End: 2019-11-25
Payer: COMMERCIAL

## 2019-11-25 DIAGNOSIS — Z12.31 VISIT FOR SCREENING MAMMOGRAM: ICD-10-CM

## 2019-11-25 PROCEDURE — 77067 SCR MAMMO BI INCL CAD: CPT | Mod: TC

## 2019-11-27 ENCOUNTER — OFFICE VISIT (OUTPATIENT)
Dept: FAMILY MEDICINE | Facility: CLINIC | Age: 54
End: 2019-11-27
Payer: COMMERCIAL

## 2019-11-27 VITALS
SYSTOLIC BLOOD PRESSURE: 119 MMHG | BODY MASS INDEX: 19.39 KG/M2 | DIASTOLIC BLOOD PRESSURE: 78 MMHG | RESPIRATION RATE: 16 BRPM | WEIGHT: 102.6 LBS | HEART RATE: 66 BPM | OXYGEN SATURATION: 95 % | TEMPERATURE: 98.3 F

## 2019-11-27 DIAGNOSIS — J06.9 VIRAL UPPER RESPIRATORY TRACT INFECTION: Primary | ICD-10-CM

## 2019-11-27 DIAGNOSIS — G89.29 CHRONIC PELVIC PAIN IN FEMALE: ICD-10-CM

## 2019-11-27 DIAGNOSIS — M80.80XS LOCALIZED OSTEOPOROSIS WITH CURRENT PATHOLOGICAL FRACTURE, SEQUELA: ICD-10-CM

## 2019-11-27 DIAGNOSIS — R10.2 CHRONIC PELVIC PAIN IN FEMALE: ICD-10-CM

## 2019-11-27 PROCEDURE — 99214 OFFICE O/P EST MOD 30 MIN: CPT | Performed by: FAMILY MEDICINE

## 2019-11-27 RX ORDER — IPRATROPIUM BROMIDE 42 UG/1
2 SPRAY, METERED NASAL 4 TIMES DAILY
Qty: 15 ML | Refills: 1 | Status: SHIPPED | OUTPATIENT
Start: 2019-11-27 | End: 2021-01-29

## 2019-11-27 RX ORDER — DEXTROMETHORPHAN POLISTIREX 30 MG/5ML
60 SUSPENSION ORAL 2 TIMES DAILY
Qty: 148 ML | Refills: 0 | Status: SHIPPED | OUTPATIENT
Start: 2019-11-27 | End: 2020-02-13

## 2019-11-27 RX ORDER — ALENDRONATE SODIUM 70 MG/1
70 TABLET ORAL
Qty: 12 TABLET | Refills: 3 | Status: SHIPPED | OUTPATIENT
Start: 2019-11-27 | End: 2021-01-05

## 2019-11-27 NOTE — PROGRESS NOTES
Subjective     Bhavani Calderon is a 54 year old female who presents to clinic today for the following health issues:    HPI   Concern - Cold  Onset: Saturday     Description:   Started off with cough, vomiting, and now green stuff when she blows her nise    Intensity: mild    Progression of Symptoms:  worsening    Accompanying Signs & Symptoms:  Has sore throat when she swallows   Taking tylenol, and motrin but it is not working.  Pt did have fever for 2 days. 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    Pt has been having chronic pelvic pain, pt went to  and had CT abdomen/pelvis with US of the abdomen, and showed cyst of the adnexa, the pain is bothersome and goes down to lower back.          Patient Active Problem List   Diagnosis     CARDIOVASCULAR SCREENING; LDL GOAL LESS THAN 100     Seasonal allergic rhinitis     Rotator cuff syndrome     Pain in joint, shoulder region     Hypothyroidism due to Hashimoto's thyroiditis     Closed compression fracture of first lumbar vertebra, initial encounter (H)     Compression fracture of T12 vertebra (H)     Localized osteoporosis with current pathological fracture, sequela     Lumbar degenerative disc disease     Past Surgical History:   Procedure Laterality Date     COLONOSCOPY N/A 4/7/2017    Procedure: COLONOSCOPY;  Surgeon: Christos Pena MD;  Location:  GI       Social History     Tobacco Use     Smoking status: Never Smoker     Smokeless tobacco: Never Used   Substance Use Topics     Alcohol use: No     Alcohol/week: 0.0 standard drinks     Family History   Problem Relation Age of Onset     Gastrointestinal Disease Father         hepatitis, unclear etiology, may be autoimmune     Gastrointestinal Disease Brother         hepatitis, unclear etiology     Diabetes Other            Reviewed and updated as needed this visit by Provider         Review of Systems   ROS COMP: INTEGUMENTARY/SKIN: NEGATIVE for worrisome rashes, moles or lesions  CV: NEGATIVE for chest pain,  "palpitations or peripheral edema      Objective    /78 (BP Location: Right arm, Patient Position: Sitting, Cuff Size: Adult Regular)   Pulse 66   Temp 98.3  F (36.8  C) (Oral)   Resp 16   Wt 46.5 kg (102 lb 9.6 oz)   LMP 03/31/2006   SpO2 95%   Breastfeeding No   BMI 19.39 kg/m    Body mass index is 19.39 kg/m .  Physical Exam   Head: Normocephalic, atraumatic.  Eyes: Conjunctiva clear, non icteric. PERRLA.  Ears: External ears nl, TM is nl   Nose: Septum midline, nasal mucosa congested. No discharge.  There is no tenderness over the maxillary sinuses, there is no tenderness over the frontal sinuses  Mouth / Throat: Normal dentition.  No oral lesions. Pharynx mild erythematous, tonsils no exudate/hypertrophy.  Neck: Supple, no enlarged LN, trachea midline.  LUNGS:  CTA B/L, no wheezing or crackles.  CVS : RRR, no murmur, no rub.                Assessment & Plan     1. Viral upper respiratory tract infection  Advised about rest, OTC medications for symptoms, drink warm liquids, and may use humidifier at night time to improve the cough.    - ipratropium (ATROVENT) 0.06 % nasal spray; Spray 2 sprays into both nostrils 4 times daily  Dispense: 15 mL; Refill: 1  - dextromethorphan (DELSYM) 30 MG/5ML liquid; Take 10 mLs (60 mg) by mouth 2 times daily  Dispense: 148 mL; Refill: 0    2. Chronic pelvic pain in female  Pt to follow up with GYN in 4 months, continue on NSAID\"s as needed.    3. Localized osteoporosis with current pathological fracture, sequela  Continue on   - alendronate (FOSAMAX) 70 MG tablet; Take 1 tablet (70 mg) by mouth every 7 days  Dispense: 12 tablet; Refill: 3           Return in about 5 days (around 12/2/2019) for if symptoms does not improve..    Manny Martínez MD  [facility]"

## 2019-12-19 ENCOUNTER — TELEPHONE (OUTPATIENT)
Dept: FAMILY MEDICINE | Facility: CLINIC | Age: 54
End: 2019-12-19

## 2019-12-19 DIAGNOSIS — E06.3 HYPOTHYROIDISM DUE TO HASHIMOTO'S THYROIDITIS: ICD-10-CM

## 2019-12-19 PROCEDURE — 84443 ASSAY THYROID STIM HORMONE: CPT | Performed by: FAMILY MEDICINE

## 2019-12-19 PROCEDURE — 36415 COLL VENOUS BLD VENIPUNCTURE: CPT | Performed by: FAMILY MEDICINE

## 2019-12-19 NOTE — TELEPHONE ENCOUNTER
Pt calling into clinic  C/o rt knee pain, not swollen  Has upcoming appt  Is on her feet much of the day at work  Advised rest, elevation, ice 20 min, good supportive shoes    Kinza vIy RN, BS  Clinical Nurse Triage.

## 2019-12-20 ENCOUNTER — TELEPHONE (OUTPATIENT)
Dept: FAMILY MEDICINE | Facility: CLINIC | Age: 54
End: 2019-12-20

## 2019-12-20 DIAGNOSIS — E06.3 HYPOTHYROIDISM DUE TO HASHIMOTO'S THYROIDITIS: ICD-10-CM

## 2019-12-20 LAB — TSH SERPL DL<=0.005 MIU/L-ACNC: 0.06 MU/L (ref 0.4–4)

## 2019-12-20 RX ORDER — LEVOTHYROXINE SODIUM 75 UG/1
75 TABLET ORAL DAILY
Qty: 90 TABLET | Refills: 1 | Status: SHIPPED | OUTPATIENT
Start: 2019-12-20 | End: 2020-10-13

## 2019-12-20 NOTE — TELEPHONE ENCOUNTER
Please decrease thyroid medicine to 75 mcg daily, and recheck with labs only in 3 months (plsease schedule an appointment for the patient)  Manny Martínez MD  [facility]  [phone]

## 2020-01-15 NOTE — PROGRESS NOTES
Pre-Visit Planning     Future Appointments   Date Time Provider Department Center   1/16/2020  4:50 PM Manny Martínez MD [facility]     Arrival Time for this Appointment:  4:40 PM     Appointment Notes for this encounter:   Right Knee Pains    Questionnaires Reviewed/Assigned  Additional questionnaires assigned      HM due pended   Health Maintenance Due   Topic Date Due     HIV SCREENING  11/14/1981     PREVENTIVE CARE VISIT  06/24/2016     ZOSTER IMMUNIZATION (1 of 2) 11/14/2016     INFLUENZA VACCINE (1) 09/01/2019     PHQ-2  01/01/2020     HPV  06/24/2020     PAP  06/24/2020     Patient preferred phone number: [phone]    Unable to reach. Left voicemail. Using  - time/date of appt left on VM and asked patient to return call to reschedule if unable to attend visit     Anabella Tucker Registered Nurse   [facility]

## 2020-01-16 ENCOUNTER — ANCILLARY PROCEDURE (OUTPATIENT)
Dept: GENERAL RADIOLOGY | Facility: CLINIC | Age: 55
End: 2020-01-16
Attending: FAMILY MEDICINE
Payer: COMMERCIAL

## 2020-01-16 ENCOUNTER — OFFICE VISIT (OUTPATIENT)
Dept: FAMILY MEDICINE | Facility: CLINIC | Age: 55
End: 2020-01-16
Payer: COMMERCIAL

## 2020-01-16 VITALS
HEART RATE: 52 BPM | WEIGHT: 115 LBS | OXYGEN SATURATION: 97 % | DIASTOLIC BLOOD PRESSURE: 67 MMHG | BODY MASS INDEX: 21.71 KG/M2 | HEIGHT: 61 IN | RESPIRATION RATE: 16 BRPM | SYSTOLIC BLOOD PRESSURE: 124 MMHG | TEMPERATURE: 98.3 F

## 2020-01-16 DIAGNOSIS — M25.561 ACUTE PAIN OF RIGHT KNEE: ICD-10-CM

## 2020-01-16 DIAGNOSIS — Z11.4 SCREENING FOR HIV (HUMAN IMMUNODEFICIENCY VIRUS): ICD-10-CM

## 2020-01-16 DIAGNOSIS — Z12.4 SCREENING FOR MALIGNANT NEOPLASM OF CERVIX: Primary | ICD-10-CM

## 2020-01-16 PROCEDURE — 99214 OFFICE O/P EST MOD 30 MIN: CPT | Performed by: FAMILY MEDICINE

## 2020-01-16 PROCEDURE — 73560 X-RAY EXAM OF KNEE 1 OR 2: CPT | Mod: RT

## 2020-01-16 ASSESSMENT — MIFFLIN-ST. JEOR: SCORE: 1064.02

## 2020-01-16 NOTE — PROGRESS NOTES
Subjective     Bhavani Calderon is a 54 year old female who presents to clinic today for the following health issues:    HPI   Joint Pain    Onset: X3 weeks     Description:   Location: right knee  Character: Sharp and the bone hurts. Knee hurts when patient is pushing the gas pedal also    Intensity: mild in the morning, severe at night    Progression of Symptoms: better, Starting to get better    Accompanying Signs & Symptoms:  Other symptoms: Patient gets pain on the left side of her hip    History:   Previous similar pain: no       Precipitating factors:   Trauma or overuse: no     Alleviating factors:  Improved by: Ibuprofen, Patient states that has helped a little  Also when s he walks slowly, it hurts less.       Patient Active Problem List   Diagnosis     CARDIOVASCULAR SCREENING; LDL GOAL LESS THAN 100     Seasonal allergic rhinitis     Rotator cuff syndrome     Pain in joint, shoulder region     Hypothyroidism due to Hashimoto's thyroiditis     Closed compression fracture of first lumbar vertebra, initial encounter (H)     Compression fracture of T12 vertebra (H)     Localized osteoporosis with current pathological fracture, sequela     Lumbar degenerative disc disease     Past Surgical History:   Procedure Laterality Date     COLONOSCOPY N/A 4/7/2017    Procedure: COLONOSCOPY;  Surgeon: Christos Pena MD;  Location:  GI       Social History     Tobacco Use     Smoking status: Never Smoker     Smokeless tobacco: Never Used   Substance Use Topics     Alcohol use: No     Alcohol/week: 0.0 standard drinks     Family History   Problem Relation Age of Onset     Gastrointestinal Disease Father         hepatitis, unclear etiology, may be autoimmune     Gastrointestinal Disease Brother         hepatitis, unclear etiology     Diabetes Other          Current Outpatient Medications   Medication Sig Dispense Refill     naproxen (NAPROSYN) 375 MG tablet Take 1 tablet (375 mg) by mouth 2 times daily (with meals) 60  "tablet 1     order for DME Equipment being ordered: knee sleeve. 1 Device 0     alendronate (FOSAMAX) 70 MG tablet Take 1 tablet (70 mg) by mouth every 7 days 12 tablet 3     dextromethorphan (DELSYM) 30 MG/5ML liquid Take 10 mLs (60 mg) by mouth 2 times daily 148 mL 0     fluticasone (FLONASE) 50 MCG/ACT nasal spray Spray 2 sprays into both nostrils daily 16 g 1     ipratropium (ATROVENT) 0.06 % nasal spray Spray 2 sprays into both nostrils 4 times daily 15 mL 1     levothyroxine (SYNTHROID/LEVOTHROID) 75 MCG tablet Take 1 tablet (75 mcg) by mouth daily 90 tablet 1     Allergies   Allergen Reactions     Penicillins      Recent Labs   Lab Test 12/19/19  1510 11/15/19  1538 09/17/19  1606  03/17/17  1523  06/24/15  0911 04/11/14  0850  07/23/12  1134   LDL  --   --   --   --  108*  --  137* 153*  --   --    HDL  --   --   --   --  58  --  62 61  --   --    TRIG  --   --   --   --  83  --  105 84  --   --    ALT  --  36  --   --   --   --   --   --   --  14   CR  --  0.48*  --   --   --   --   --   --   --   --    GFRESTIMATED  --  >90  --   --   --   --   --   --   --   --    GFRESTBLACK  --  >90  --   --   --   --   --   --   --   --    POTASSIUM  --  3.8  --   --   --   --   --   --   --   --    TSH 0.06*  --  <0.01*   < > 6.53*   < > 0.07* 0.02*   < > 0.40    < > = values in this interval not displayed.        Reviewed and updated as needed this visit by Provider         Review of Systems   ROS COMP: CONSTITUTIONAL: NEGATIVE for fever, chills, change in weight  NEURO: NEGATIVE for weakness, dizziness or paresthesias      Objective    /67 (BP Location: Right arm, Patient Position: Sitting, Cuff Size: Adult Regular)   Pulse 52   Temp 98.3  F (36.8  C) (Oral)   Resp 16   Ht 1.549 m (5' 1\")   Wt 52.2 kg (115 lb)   LMP 03/31/2006   SpO2 97%   Breastfeeding No   BMI 21.73 kg/m    Body mass index is 21.73 kg/m .  Physical Exam   Knee exam:  Inspection: normal   no antalgic gait,positive soft tissue " tenderness over medial part, effusion negative,  range of motion of the knee is normal, - anterior and posterior drawer sign, - pivot-shift, collateral ligaments intact, positive Norbert sign, positive Apley's sign, negative Lachmann sign, no tenderness over tibial tubercle.  X-ray:  I reviewed the X-ray myself and it showed normal joint, no OA changes.  .                Assessment & Plan     1. Screening for malignant neoplasm of cervix  Pt to come back in  1month for pap smear.    2. Screening for HIV (human immunodeficiency virus)  Low risk.    3. Acute pain of right knee  Mostly meniscus injury on the Rt side, I dicussed options for treatment with patient, she opted going with physical therapy to and take it from there.   - XR Knee Standing Right 2 Views; Future  - [facility]; Future  - naproxen (NAPROSYN) 375 MG tablet; Take 1 tablet (375 mg) by mouth 2 times daily (with meals)  Dispense: 60 tablet; Refill: 1  - order for DME; Equipment being ordered: knee sleeve.  Dispense: 1 Device; Refill: 0           Return in about 4 weeks (around 2/13/2020) for follow up on knee pain, and also do pap smear..    Manny Martínez MD  [facility]

## 2020-01-22 ENCOUNTER — THERAPY VISIT (OUTPATIENT)
Dept: PHYSICAL THERAPY | Facility: CLINIC | Age: 55
End: 2020-01-22
Attending: FAMILY MEDICINE
Payer: COMMERCIAL

## 2020-01-22 DIAGNOSIS — M25.561 ACUTE PAIN OF RIGHT KNEE: ICD-10-CM

## 2020-01-22 PROCEDURE — 97110 THERAPEUTIC EXERCISES: CPT | Mod: GP | Performed by: PHYSICAL THERAPIST

## 2020-01-22 PROCEDURE — 97161 PT EVAL LOW COMPLEX 20 MIN: CPT | Mod: GP | Performed by: PHYSICAL THERAPIST

## 2020-01-22 ASSESSMENT — ACTIVITIES OF DAILY LIVING (ADL)
RISE FROM A CHAIR: ACTIVITY IS SOMEWHAT DIFFICULT
KNEE_ACTIVITY_OF_DAILY_LIVING_SCORE: 51.43
GIVING WAY, BUCKLING OR SHIFTING OF KNEE: I DO NOT HAVE THE SYMPTOM
KNEE_ACTIVITY_OF_DAILY_LIVING_SUM: 36
SQUAT: ACTIVITY IS SOMEWHAT DIFFICULT
PAIN: THE SYMPTOM AFFECTS MY ACTIVITY MODERATELY
GO UP STAIRS: ACTIVITY IS FAIRLY DIFFICULT
SWELLING: I DO NOT HAVE THE SYMPTOM
KNEEL ON THE FRONT OF YOUR KNEE: ACTIVITY IS FAIRLY DIFFICULT
WALK: ACTIVITY IS VERY DIFFICULT
GO DOWN STAIRS: ACTIVITY IS VERY DIFFICULT
AS_A_RESULT_OF_YOUR_KNEE_INJURY,_HOW_WOULD_YOU_RATE_YOUR_CURRENT_LEVEL_OF_DAILY_ACTIVITY?: ABNORMAL
HOW_WOULD_YOU_RATE_THE_CURRENT_FUNCTION_OF_YOUR_KNEE_DURING_YOUR_USUAL_DAILY_ACTIVITIES_ON_A_SCALE_FROM_0_TO_100_WITH_100_BEING_YOUR_LEVEL_OF_KNEE_FUNCTION_PRIOR_TO_YOUR_INJURY_AND_0_BEING_THE_INABILITY_TO_PERFORM_ANY_OF_YOUR_USUAL_DAILY_ACTIVITIES?: 100
LIMPING: THE SYMPTOM AFFECTS MY ACTIVITY MODERATELY
SIT WITH YOUR KNEE BENT: ACTIVITY IS FAIRLY DIFFICULT
WEAKNESS: I DO NOT HAVE THE SYMPTOM
RAW_SCORE: 36
STIFFNESS: THE SYMPTOM AFFECTS MY ACTIVITY MODERATELY
STAND: ACTIVITY IS VERY DIFFICULT

## 2020-01-22 NOTE — PROGRESS NOTES
[facility] Initial Evaluation  Subjective:  Onset of right knee pain 4 weeks ago of unknown etiology. Pt reports symptoms are decreasing with time but still notes pain at the end of the day. Pt referred by MD for physical therapy on 1-16-20    The history is provided by the patient. No  was used.   Type of problem:  Right knee   Condition occurred with:  Insidious onset. This is a new condition   Problem details: Pt rates pain as a 6/10.   Patient reports pain:  Medial.  Associated symptoms:  Loss of motion/stiffness and loss of strength. Symptoms are exacerbated by standing, walking, ascending stairs, descending stairs, bending/squatting and kneeling (driving) and relieved by NSAID's and analgesics.                      Objective:  System                                                Knee Evaluation:  ROM:  Strength wnl knee: weak pelvic stabilizers.  AROM    Hyperextension: Left:     Right: 0  Extension: Left:    Right:  5  Flexion: Left:   Right: 130  PROM    Hyperextension: Left:   Right:  0  Extension: Left:   Right:  0  Flexion: Left:   Right:  135      Strength:     Extension:  Right: 4/5   Pain:  Flexion:  Right: 5/5   Pain:          Special Tests:     Right knee positive for the following tests:  Meniscal  Palpation:      Right knee tenderness present at:  Medial Joint Line            General     ROS    Assessment/Plan:    Patient is a 54 year old female with right side knee complaints.    Patient has the following significant findings with corresponding treatment plan.                Diagnosis 1:  Right knee pain  Pain -  hot/cold therapy, manual therapy, self management, education and home program  Decreased ROM/flexibility - manual therapy, therapeutic exercise, therapeutic activity and home program  Decreased strength - therapeutic exercise, therapeutic activities and home program    Therapy Evaluation Codes:   1) History comprised of:   Personal factors that  impact the plan of care:      None.    Comorbidity factors that impact the plan of care are:      Weakness and osteoporosis.     Medications impacting care: Anti-inflammatory, Pain and thyroid.  2) Examination of Body Systems comprised of:   Body structures and functions that impact the plan of care:      Knee.   Activity limitations that impact the plan of care are:      Bathing, Bending, Dressing, Lifting, Squatting/kneeling, Stairs, Standing, Walking and Sleeping.  3) Clinical presentation characteristics are:   Stable/Uncomplicated.  4) Decision-Making    Low complexity using standardized patient assessment instrument and/or measureable assessment of functional outcome.  Cumulative Therapy Evaluation is: Low complexity.    Previous and current functional limitations:  (See Goal Flow Sheet for this information)    Short term and Long term goals: (See Goal Flow Sheet for this information)     Communication ability:  Patient appears to be able to clearly communicate and understand verbal and written communication and follow directions correctly.  Treatment Explanation - The following has been discussed with the patient:   RX ordered/plan of care  Anticipated outcomes  Possible risks and side effects  This patient would benefit from PT intervention to resume normal activities.   Rehab potential is good.    Frequency:  1 X week, once daily  Duration:  for 6 weeks  Discharge Plan:  Achieve all LTG.  Independent in home treatment program.  Reach maximal therapeutic benefit.    Please refer to the daily flowsheet for treatment today, total treatment time and time spent performing 1:1 timed codes.

## 2020-01-30 ENCOUNTER — THERAPY VISIT (OUTPATIENT)
Dept: PHYSICAL THERAPY | Facility: CLINIC | Age: 55
End: 2020-01-30
Payer: COMMERCIAL

## 2020-01-30 DIAGNOSIS — M25.561 ACUTE PAIN OF RIGHT KNEE: ICD-10-CM

## 2020-01-30 PROCEDURE — 97110 THERAPEUTIC EXERCISES: CPT | Mod: GP | Performed by: PHYSICAL THERAPY ASSISTANT

## 2020-02-05 ENCOUNTER — THERAPY VISIT (OUTPATIENT)
Dept: PHYSICAL THERAPY | Facility: CLINIC | Age: 55
End: 2020-02-05
Payer: COMMERCIAL

## 2020-02-05 DIAGNOSIS — M25.561 ACUTE PAIN OF RIGHT KNEE: ICD-10-CM

## 2020-02-05 PROCEDURE — 97110 THERAPEUTIC EXERCISES: CPT | Mod: GP | Performed by: PHYSICAL THERAPIST

## 2020-02-05 PROCEDURE — 97530 THERAPEUTIC ACTIVITIES: CPT | Mod: GP | Performed by: PHYSICAL THERAPIST

## 2020-02-12 ENCOUNTER — THERAPY VISIT (OUTPATIENT)
Dept: PHYSICAL THERAPY | Facility: CLINIC | Age: 55
End: 2020-02-12
Payer: COMMERCIAL

## 2020-02-12 DIAGNOSIS — M25.561 ACUTE PAIN OF RIGHT KNEE: ICD-10-CM

## 2020-02-12 PROCEDURE — 97110 THERAPEUTIC EXERCISES: CPT | Mod: GP | Performed by: PHYSICAL THERAPIST

## 2020-02-12 PROCEDURE — 97530 THERAPEUTIC ACTIVITIES: CPT | Mod: GP | Performed by: PHYSICAL THERAPIST

## 2020-02-12 NOTE — PROGRESS NOTES
Subjective:  HPI  Physical Exam                    Objective:  System    Physical Exam    General     ROS    Assessment/Plan:    PROGRESS  REPORT    Progress reporting period is from 1-22-20 to 2-12-20.       SUBJECTIVE  Subjective changes noted by patient:  Pt notes overall improvement. Pt notes decreased pain and is tolerating increased activity.       Current pain level is 0/10 to 5/10.     Previous pain level was  6/10  .   Changes in function:  Yes,   Pt notes increased ambulatory endurance. Pt notes increased ease rising from a chair. Pt still notes pain with stairs.   Adverse reaction to treatment or activity: None    OBJECTIVE  Changes noted in objective findings:  Pt notes decreased pain with Ying taping of the knee. Right knee AROM 0-0-120 PROM 2-0-132. Pt demonstrates improved quad strength and pelvic stability . Pt remains tender to palpation superior medial corner of the right patella        ASSESSMENT/PLAN  Updated problem list and treatment plan: Diagnosis 1:  Right knee pain  Pain -  hot/cold therapy, manual therapy, splint/taping/bracing/orthotics, self management, education and home program  Decreased ROM/flexibility - manual therapy, therapeutic exercise, therapeutic activity and home program  Decreased strength - therapeutic exercise, therapeutic activities and home program  STG/LTGs have been met or progress has been made towards goals:  Yes,   Assessment of Progress: The patient's condition is improving.  Self Management Plans:  Patient has been instructed in a home treatment program.  I have re-evaluated this patient and find that the nature, scope, duration and intensity of the therapy is appropriate for the medical condition of the patient.  Bhavani continues to require the following intervention to meet STG and LTG's:  PT    Recommendations:  This patient would benefit from continued therapy.     Frequency:  1 X week, once daily  Duration:  for 3   weeks        Please refer to the  daily flowsheet for treatment today, total treatment time and time spent performing 1:1 timed codes.

## 2020-02-13 NOTE — PROGRESS NOTES
"Pre-Visit Planning     Future Appointments   Date Time Provider Department Center   2/14/2020  9:00 AM Manny Martínez MD [facility]   2/19/2020  4:00 PM Vishal Bruner, PT [facility]     Arrival Time for this Appointment:  8:50 AM     Appointment Notes for this encounter:   follow up knee pain, pap smear    Questionnaires Reviewed/Assigned  No additional questionnaires are needed     Patient preferred phone number: [phone]    Spoke to patient via phone. Patient does not have additional questions or concerns.        Visit is not preventive.    Health Maintenance Due   Topic Date Due     HIV SCREENING  11[birth year]14[birth year]1981     PREVENTIVE CARE VISIT  06/24/2016     ZOSTER IMMUNIZATION (1 of 2) 11/14/2016     HPV TEST  06/24/2020     PAP  06/24/2020     Patient is due for:  preventive care visit and pap  will discuss at visit tomorrow, pap pended     MyChart  Patient is active on MyChart.    Questionnaire Review   none needed     Call Summary  \"Thank you for your time today.  If anything comes up before your appointment, please feel free to contact us at [phone].\"    Musa Farrar Nurse   [facility]       "

## 2020-02-14 ENCOUNTER — OFFICE VISIT (OUTPATIENT)
Dept: FAMILY MEDICINE | Facility: CLINIC | Age: 55
End: 2020-02-14
Payer: COMMERCIAL

## 2020-02-14 VITALS
SYSTOLIC BLOOD PRESSURE: 114 MMHG | BODY MASS INDEX: 21.14 KG/M2 | HEIGHT: 61 IN | WEIGHT: 112 LBS | RESPIRATION RATE: 14 BRPM | DIASTOLIC BLOOD PRESSURE: 60 MMHG | TEMPERATURE: 98.1 F | HEART RATE: 56 BPM

## 2020-02-14 DIAGNOSIS — Z12.4 SCREENING FOR MALIGNANT NEOPLASM OF CERVIX: Primary | ICD-10-CM

## 2020-02-14 DIAGNOSIS — E06.3 HYPOTHYROIDISM DUE TO HASHIMOTO'S THYROIDITIS: ICD-10-CM

## 2020-02-14 DIAGNOSIS — Z11.4 SCREENING FOR HIV (HUMAN IMMUNODEFICIENCY VIRUS): ICD-10-CM

## 2020-02-14 DIAGNOSIS — M25.561 ACUTE PAIN OF RIGHT KNEE: ICD-10-CM

## 2020-02-14 LAB — TSH SERPL DL<=0.005 MIU/L-ACNC: 1.52 MU/L (ref 0.4–4)

## 2020-02-14 PROCEDURE — 99214 OFFICE O/P EST MOD 30 MIN: CPT | Performed by: FAMILY MEDICINE

## 2020-02-14 PROCEDURE — 36415 COLL VENOUS BLD VENIPUNCTURE: CPT | Performed by: FAMILY MEDICINE

## 2020-02-14 PROCEDURE — 87624 HPV HI-RISK TYP POOLED RSLT: CPT | Performed by: FAMILY MEDICINE

## 2020-02-14 PROCEDURE — 84443 ASSAY THYROID STIM HORMONE: CPT | Performed by: FAMILY MEDICINE

## 2020-02-14 PROCEDURE — G0145 SCR C/V CYTO,THINLAYER,RESCR: HCPCS | Performed by: FAMILY MEDICINE

## 2020-02-14 ASSESSMENT — MIFFLIN-ST. JEOR: SCORE: 1050.41

## 2020-02-14 NOTE — PROGRESS NOTES
Subjective     Bhavani Calderon is a 54 year old female who presents to clinic today for the following health issues:    HPI   Joint Pain    Onset: Follow up     Description:   Location: right knee  Character: Sharp and Burning    Intensity: mild    Progression of Symptoms: better    Accompanying Signs & Symptoms:  Other symptoms: she I still having pain in the knee when she walks a lot.    History:   Previous similar pain: no       Precipitating factors:   Trauma or overuse: no     Alleviating factors:  Improved by:  physical therapy    Therapies Tried and outcome: naprosyn mainly at night.      Hypothyroidism Follow-up      Since last visit, patient describes the following symptoms: fatigue and hair loss      How many servings of fruits and vegetables do you eat daily?  4 or more    On average, how many sweetened beverages do you drink each day (Examples: soda, juice, sweet tea, etc.  Do NOT count diet or artificially sweetened beverages)?   2    How many days per week do you exercise enough to make your heart beat faster? 3 or less    How many minutes a day do you exercise enough to make your heart beat faster? 9 or less    How many days per week do you miss taking your medication? 0             Patient Active Problem List   Diagnosis     CARDIOVASCULAR SCREENING; LDL GOAL LESS THAN 100     Seasonal allergic rhinitis     Rotator cuff syndrome     Pain in joint, shoulder region     Hypothyroidism due to Hashimoto's thyroiditis     Closed compression fracture of first lumbar vertebra, initial encounter (H)     Compression fracture of T12 vertebra (H)     Localized osteoporosis with current pathological fracture, sequela     Lumbar degenerative disc disease     Acute pain of right knee     Past Surgical History:   Procedure Laterality Date     COLONOSCOPY N/A 4/7/2017    Procedure: COLONOSCOPY;  Surgeon: Christos Pena MD;  Location:  GI       Social History     Tobacco Use     Smoking status: Never Smoker      Smokeless tobacco: Never Used   Substance Use Topics     Alcohol use: No     Alcohol/week: 0.0 standard drinks     Family History   Problem Relation Age of Onset     Gastrointestinal Disease Father         hepatitis, unclear etiology, may be autoimmune     Gastrointestinal Disease Brother         hepatitis, unclear etiology     Diabetes Other          Current Outpatient Medications   Medication Sig Dispense Refill     alendronate (FOSAMAX) 70 MG tablet Take 1 tablet (70 mg) by mouth every 7 days 12 tablet 3     ipratropium (ATROVENT) 0.06 % nasal spray Spray 2 sprays into both nostrils 4 times daily 15 mL 1     levothyroxine (SYNTHROID/LEVOTHROID) 75 MCG tablet Take 1 tablet (75 mcg) by mouth daily 90 tablet 1     naproxen (NAPROSYN) 375 MG tablet Take 1 tablet (375 mg) by mouth 2 times daily (with meals) 60 tablet 1     order for DME Equipment being ordered: knee sleeve. 1 Device 0     Allergies   Allergen Reactions     Penicillins        Reviewed and updated as needed this visit by Provider         Review of Systems   ROS COMP: CONSTITUTIONAL: NEGATIVE for fever, chills, change in weight  RESP: NEGATIVE for significant cough or SOB  CV: NEGATIVE for chest pain, palpitations or peripheral edema      Objective    LMP 03/31/2006   There is no height or weight on file to calculate BMI.  Physical Exam   GENERAL: healthy, alert and no distress  Knee exam:  Inspection:normal   no antalgic gait,no soft tissue tenderness over knee, effusion negative,  range of motion of the knee is nl, - anterior and posterior drawer sign, - pivot-shift, collateral ligaments intact.       (female): normal female external genitalia, normal urethral meatus, vaginal mucosa, normal cervix/adnexa/uterus without masses or discharge            Assessment & Plan     1. Screening for malignant neoplasm of cervix  Done today.  - Pap imaged thin layer screen with HPV - recommended age 30 - 65 years (select HPV order below)    2. Screening for HIV  (human immunodeficiency virus)  No need for screen, low risk.    3. Hypothyroidism due to Hashimoto's thyroiditis  Check   - TSH    4. Acute pain of right knee  Continue on PT.             Return in about 6 months (around 8/14/2020) for multiple problems follow up.    Manny Martínez MD  [facility]

## 2020-02-19 ENCOUNTER — THERAPY VISIT (OUTPATIENT)
Dept: PHYSICAL THERAPY | Facility: CLINIC | Age: 55
End: 2020-02-19
Payer: COMMERCIAL

## 2020-02-19 DIAGNOSIS — M25.561 ACUTE PAIN OF RIGHT KNEE: ICD-10-CM

## 2020-02-19 LAB
COPATH REPORT: NORMAL
PAP: NORMAL

## 2020-02-19 PROCEDURE — 97530 THERAPEUTIC ACTIVITIES: CPT | Mod: GP | Performed by: PHYSICAL THERAPIST

## 2020-02-19 PROCEDURE — 97110 THERAPEUTIC EXERCISES: CPT | Mod: GP | Performed by: PHYSICAL THERAPIST

## 2020-02-20 LAB
FINAL DIAGNOSIS: NORMAL
HPV HR 12 DNA CVX QL NAA+PROBE: NEGATIVE
HPV16 DNA SPEC QL NAA+PROBE: NEGATIVE
HPV18 DNA SPEC QL NAA+PROBE: NEGATIVE
SPECIMEN DESCRIPTION: NORMAL
SPECIMEN SOURCE CVX/VAG CYTO: NORMAL

## 2020-03-09 ENCOUNTER — THERAPY VISIT (OUTPATIENT)
Dept: PHYSICAL THERAPY | Facility: CLINIC | Age: 55
End: 2020-03-09
Payer: COMMERCIAL

## 2020-03-09 DIAGNOSIS — M25.561 ACUTE PAIN OF RIGHT KNEE: ICD-10-CM

## 2020-03-09 PROCEDURE — 97530 THERAPEUTIC ACTIVITIES: CPT | Mod: GP | Performed by: PHYSICAL THERAPIST

## 2020-03-09 PROCEDURE — 97110 THERAPEUTIC EXERCISES: CPT | Mod: GP | Performed by: PHYSICAL THERAPIST

## 2020-03-09 NOTE — PROGRESS NOTES
Subjective:  HPI  Physical Exam                    Objective:  System    Physical Exam    General     ROS    Assessment/Plan:    SUBJECTIVE  Subjective changes as noted by pt:  Pt reports increased pain over the week end after doing the step climber machine at the gym for 15 minutes. Pain has decreased by today     Current pain level: 3/10     Changes in function:  Pt still notes pain with steps     Adverse reaction to treatment or activity:  None    OBJECTIVE  Changes in objective findings:  Pt demonstrates improved eccentric quad control on leg press and steps.       ASSESSMENT  Bhavani continues to require intervention to meet STG and LTG's: PT  Patient's symptoms are resolving.  Response to therapy has shown an improvement in  strength  Progress made towards STG/LTG?  Yes,     PLAN  Current treatment program is being advanced to more complex exercises.    PTA/ATC plan:  N/A    Please refer to the daily flowsheet for treatment today, total treatment time and time spent performing 1:1 timed codes.

## 2020-04-06 ENCOUNTER — VIRTUAL VISIT (OUTPATIENT)
Dept: PHYSICAL THERAPY | Facility: CLINIC | Age: 55
End: 2020-04-06
Payer: COMMERCIAL

## 2020-04-06 DIAGNOSIS — M25.561 ACUTE PAIN OF RIGHT KNEE: ICD-10-CM

## 2020-04-06 PROCEDURE — 97110 THERAPEUTIC EXERCISES: CPT | Mod: GT | Performed by: PHYSICAL THERAPIST

## 2020-04-06 PROCEDURE — 97112 NEUROMUSCULAR REEDUCATION: CPT | Mod: GT | Performed by: PHYSICAL THERAPIST

## 2020-04-06 NOTE — PROGRESS NOTES
"Physical Therapy Virtual Follow Up Visit      The patient has been notified of following:     \"This virtual visit will be conducted between you and your provider. We have found that certain health care needs can be provided without the need for physical presence.  This service lets us provide the care you need with a virtual visit.\"    Due to external, as well as internal [facility] management of the COVID-19 Virus, Bhavani Calderon was not seen in our clinic.  As a substitution, we implemented a virtual visit to manage this patient's condition utilizing the [employer] virtual visit platform via the patient s existing code.  The provider, Julio Molina, reviewed the patient's chart, PTRx prescription, and spoke with the patient to determine the following telemedicine visit is appropriate and effective for the patient's care.    The following type of visit was completed:   Video Visit:  The [employer] platform uses a synchronous HIPAA compliant video stream for this patient encounter.        S: Bhavani Calderon is a 54 year old female. Connected virtually on the PTRx platform to discuss their condition/progress. They noted improvements in exercises and funciton.  They noted ongoing pain or limitations with work tasks such as carrying heavy bags, stairs, and prolonged walking.     Current pain level: 8/10  See flowsheet    O: Patient demonstrated improvements with exercises, control of lower extremity rotation with cuing.      PTRx Content from today's visit:    Review:  Exercise Name: Balance Single Leg Stance Supported and Unsupported, Sets: 1 - Reps: 2x30 seconds - Sessions: 1-2x/day, Notes: eyes open  Exercise Name: Knee Bends, Sets: 1 - Reps: 15-30x - Sessions: 1-2x/day, Notes: chair behind you  Exercise Name: Hip Flexion Straight Leg Raise, Sets: 1 - Reps: 10-30x - Sessions: 1-2x/day, Notes: Add weight to the ankle.   Exercise Name: Clamshell Feet together, Sets: 1 - Reps: 15-30x - Sessions: optional    New:  Exercise " Name: Education Sheet Knee, Notes: Arch and hip activation: while standing, activate the muscles in the hip and in the arches of the feet by creating the sensation of pushing the knees outward. This will help you use the hip and foot muscles to support the knee.   Exercise Name: Bridging #2A Weight Shift, Sets: 1 - Reps: work up to 30 on each side - Sessions: 1  Exercise Name: Standing Fire Hydrant, Sets: 1 - Reps: work up to 30 - Sessions: 1        A:   Patient is ready to progress to more complex exercises.  Response to therapy has shown an improvement in  strength      P: Patient will continue with the exercise program assigned on their PTRx code and will add the following measures to manage their pain/condition: no new measures, simple advancement of exercises.      Current treatment program is being advanced to more complex exercises.      Virtual visit contact time    Time of service began: 8:10 AM  Time of service ended: 8:40 AM  Total Time for set up, visit, and documentation: 15 minutes    Payor: [employer] / Plan: [employer] HMO / Product Type: PPO /   .  Procedure Code/s   Therapeutic Exercise (28248): 19 minutes  Neuromuscular Re-education (37446): 9 minutes    I have reviewed the note as documented above.  This accurately captures the substance of my conversation with the patient.  Provider location: [place], [place] ([facility] [place]State)  Patient location: [place], [place]    ___________________________________________________    Any subjective info about the quality of the visit, ease of use: Needed guidance to reach the virtual appointment, was easy once it was there  Patient's opinion about reasonable cost for this visit did not inquire

## 2020-04-20 ENCOUNTER — VIRTUAL VISIT (OUTPATIENT)
Dept: PHYSICAL THERAPY | Facility: CLINIC | Age: 55
End: 2020-04-20
Payer: COMMERCIAL

## 2020-04-20 DIAGNOSIS — M25.561 ACUTE PAIN OF RIGHT KNEE: Primary | ICD-10-CM

## 2020-04-20 PROCEDURE — 97110 THERAPEUTIC EXERCISES: CPT | Mod: GT | Performed by: PHYSICAL THERAPIST

## 2020-04-20 NOTE — PROGRESS NOTES
"Physical Therapy Virtual Follow Up Visit      The patient has been notified of following:     \"This virtual visit will be conducted between you and your provider. We have found that certain health care needs can be provided without the need for physical presence.  This service lets us provide the care you need with a virtual visit.\"    Due to external, as well as internal [facility] management of the COVID-19 Virus, Bhavani Calderon was not seen in our clinic.  As a substitution, we implemented a virtual visit to manage this patient's condition utilizing the PTRx virtual visit platform via the patient s existing code.  The provider, Liset Contreras, reviewed the patient's chart, PTRx prescription, and spoke with the patient to determine the following telemedicine visit is appropriate and effective for the patient's care.    The following type of visit was completed:   Video Visit:  The PTRx platform uses a synchronous HIPAA compliant video stream for this patient encounter.        S: Bhavani Calderon is a 54 year old female. Connected virtually on the PTRx platform to discuss their condition/progress. They noted improvements in ***.  They noted ongoing pain or limitations with ***.     Current pain level: {PAIN LEVEL (SCALE OF 10):247905}  ***    O: Patient demonstrated ***     PTRx Content from today's visit:  ***    A:   {ASSESSMENT THERAPY:223852}  {Response to PT:241886}  ***    P: Patient will continue with the exercise program assigned on their PTRx code and will add the following measures to manage their pain/condition: ***     {Response to PT:169928}      Virtual visit contact time    Time of service began: *** {AM/PM:033296}  Time of service ended: *** {AM/PM:967817}  Total Time for set up, visit, and documentation: *** minutes    Payor: PREFERREDONE / Plan: PREFERREDONE HMO / Product Type: PPO /   .  Procedure Code/s   {ROLANDA VIRTUAL CPT CHARGES:407539}    I have reviewed the note as documented above.  " This accurately captures the substance of my conversation with the patient.  Provider location: *** (City/State)  Patient location: ***

## 2020-04-20 NOTE — PROGRESS NOTES
"Physical Therapy Virtual Follow Up Visit      The patient has been notified of following:     \"This virtual visit will be conducted between you and your provider. We have found that certain health care needs can be provided without the need for physical presence.  This service lets us provide the care you need with a virtual visit.\"    Due to external, as well as internal [facility] management of the COVID-19 Virus, Bhavani Calderon was not seen in our clinic.  As a substitution, we implemented a virtual visit to manage this patient's condition utilizing the [employer] virtual visit platform via the patient s existing code.  The provider, Liset Contreras, reviewed the patient's chart, PTRx prescription, and spoke with the patient to determine the following telemedicine visit is appropriate and effective for the patient's care.    The following type of visit was completed:   Video Visit:  The [employer] platform uses a synchronous HIPAA compliant video stream for this patient encounter.        S: Bhavani Calderon is a 54 year old female. Connected virtually on the [employer] platform to discuss their condition/progress. They noted improvements in pain and exercises. Stairs are much better. They noted ongoing pain or limitations with walking prolonged periods (over 30 min). 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  Pt also notes anytime she twists her lower leg, she feels the knee pain (sharp).   Current pain level: 8/10      O: Patient demonstrated     AROM: pt subjectively noted full motion bilaterally    Pt demonstrated good understanding and technique with PT exercises (needed cuing for no knees over toes for knee bends, and more hip extension for bridging with march). Added SLR abduction for gluteus medius strengthening. Pt educated to continue to work on strength at home. Due to still having pain in the knee and trying PT for 3 months, I recommended contacting her referring provider to " get further evaluation of the knee (possible MRI, pt also notes she has not had a cortisone injection).         PTRx Content from today's visit:  Exercise Name: Icing, Sets: 1 - Reps: 10-15 minutes - Sessions: 1-2x/day  Exercise Name: Repeated Knee Extension with Overpressure Neutral/IR/ER, Sets: 1 - Reps: 10 reps - Sessions: 4 times a day  Exercise Name: Balance Single Leg Stance Supported and Unsupported, Sets: 1 - Reps: 2x30 seconds - Sessions: 1-2x/day, Notes: eyes open  Exercise Name: Stepdown Backward, Sets: 1 - Reps: 10-30x 6 inch step - Sessions: 1-2x/day  Exercise Name: Knee Bends, Sets: 1 - Reps: 15-30x - Sessions: 1-2x/day, Notes: chair behind you- sit butt backward, no knees over toes  Exercise Name: Hip Abduction Straight Leg Raise, Sets: 1 set - Reps: to fatigue each side - Sessions: 3-5X/week, Notes: - key is to keep hip pinned forward, leg goes back and up  Exercise Name: Clamshell Feet together, Sets: 1 - Reps: 15-30x - Sessions: optional  Exercise Name: Bridging #2A Weight Shift, Sets: 1 - Reps: work up to 30 on each side - Sessions: 1, Notes: - hip up as far as you can- keep core engaged and pelvis level    A:   Patient is becoming more independent in home exercise program  Patient is not progressing as expected.  Response to therapy has shown lack of progress in  pain level  Recommended following up with referring provider.    P: Patient will continue with the exercise program assigned on their PTRx code and will add the following measures to manage their pain/condition: continue icing daily, and only strengthen 3-4X/week. Will contact MD for further recommendation.    Continue current treatment until MD allows progression of the treatment plan.      Virtual visit contact time    Time of service began: 2:40 PM  Time of service ended: 3:10 PM  Total Time for set up, visit, and documentation: 30 minutes    Payor: PREFERREDONE / Plan: PREFERREDONE HMO / Product Type: PPO /   .  Procedure Code/s    Therapeutic Exercise (71296): 25 minutes    I have reviewed the note as documented above.  This accurately captures the substance of my conversation with the patient.  Provider location: [place] ([facility]State)  Patient location: home

## 2020-06-22 PROBLEM — M25.561 ACUTE PAIN OF RIGHT KNEE: Status: RESOLVED | Noted: 2020-01-22 | Resolved: 2020-06-22

## 2020-06-22 NOTE — PROGRESS NOTES
Patient did not return for further treatment and no additional progress was noted.  Please refer to the progress note and goal flowsheet completed on 02/12/20 for discharge information.

## 2020-10-13 DIAGNOSIS — E06.3 HYPOTHYROIDISM DUE TO HASHIMOTO'S THYROIDITIS: ICD-10-CM

## 2020-10-13 RX ORDER — LEVOTHYROXINE SODIUM 75 UG/1
75 TABLET ORAL DAILY
Qty: 90 TABLET | Refills: 0 | Status: SHIPPED | OUTPATIENT
Start: 2020-10-13 | End: 2021-01-11

## 2020-10-13 RX ORDER — LEVOTHYROXINE SODIUM 100 UG/1
100 TABLET ORAL DAILY
Qty: 90 TABLET | Refills: 0 | OUTPATIENT
Start: 2020-10-13

## 2020-10-13 NOTE — TELEPHONE ENCOUNTER
Prescription approved per [facility] Refill Protocol.    Jeanine Enamorado RN

## 2020-10-13 NOTE — TELEPHONE ENCOUNTER
Dose changed to 75 mcg 12/20/19.  Jeanine Enamorado, RN    Manny Martínez MD   2/15/2020 10:26 AM      Po Calderon,  All your results are within normal including thyroid test.  No need to change your thyroid medicine dosage.  Manny Martínez MD  [facility]  [phone]          Melanie Tello CMA         12/20/19 1:47 PM  Note     LMOM with  services  Melanie Tello CMA                  12/20/19 1:35 PM  Manny Martínez MD routed this conversation to  Sb2/3/4 [facility] Team ([facility])   Manny Martínez MD         12/20/19 1:35 PM  Note     Please decrease thyroid medicine to 75 mcg daily, and recheck with labs only in 3 months (plsease schedule an appointment for the patient)  Manny Martínez MD  [facility]  [phone]

## 2020-11-16 ENCOUNTER — HEALTH MAINTENANCE LETTER (OUTPATIENT)
Age: 55
End: 2020-11-16

## 2020-12-31 ENCOUNTER — APPOINTMENT (OUTPATIENT)
Dept: GENERAL RADIOLOGY | Facility: CLINIC | Age: 55
End: 2020-12-31
Attending: EMERGENCY MEDICINE
Payer: COMMERCIAL

## 2020-12-31 ENCOUNTER — VIRTUAL VISIT (OUTPATIENT)
Dept: URGENT CARE | Facility: CLINIC | Age: 55
End: 2020-12-31
Payer: COMMERCIAL

## 2020-12-31 ENCOUNTER — HOSPITAL ENCOUNTER (EMERGENCY)
Facility: CLINIC | Age: 55
Discharge: HOME | End: 2020-12-31
Attending: EMERGENCY MEDICINE | Admitting: EMERGENCY MEDICINE
Payer: COMMERCIAL

## 2020-12-31 VITALS
RESPIRATION RATE: 18 BRPM | OXYGEN SATURATION: 98 % | TEMPERATURE: 98.5 F | DIASTOLIC BLOOD PRESSURE: 73 MMHG | SYSTOLIC BLOOD PRESSURE: 146 MMHG | HEART RATE: 62 BPM

## 2020-12-31 DIAGNOSIS — R06.02 SHORTNESS OF BREATH: Primary | ICD-10-CM

## 2020-12-31 DIAGNOSIS — Z20.822 SUSPECTED COVID-19 VIRUS INFECTION: ICD-10-CM

## 2020-12-31 DIAGNOSIS — U07.1 INFECTION DUE TO 2019 NOVEL CORONAVIRUS: ICD-10-CM

## 2020-12-31 LAB
ANION GAP SERPL CALCULATED.3IONS-SCNC: 2 MMOL/L (ref 3–14)
BASOPHILS # BLD AUTO: 0 10E9/L (ref 0–0.2)
BASOPHILS NFR BLD AUTO: 0.6 %
BUN SERPL-MCNC: 10 MG/DL (ref 7–30)
CALCIUM SERPL-MCNC: 8.8 MG/DL (ref 8.5–10.1)
CHLORIDE SERPL-SCNC: 111 MMOL/L (ref 94–109)
CO2 SERPL-SCNC: 30 MMOL/L (ref 20–32)
CREAT SERPL-MCNC: 0.56 MG/DL (ref 0.52–1.04)
D DIMER PPP FEU-MCNC: 0.3 UG/ML FEU (ref 0–0.5)
DIFFERENTIAL METHOD BLD: NORMAL
EOSINOPHIL # BLD AUTO: 0 10E9/L (ref 0–0.7)
EOSINOPHIL NFR BLD AUTO: 0.8 %
ERYTHROCYTE [DISTWIDTH] IN BLOOD BY AUTOMATED COUNT: 13.2 % (ref 10–15)
GFR SERPL CREATININE-BSD FRML MDRD: >90 ML/MIN/{1.73_M2}
GLUCOSE SERPL-MCNC: 91 MG/DL (ref 70–99)
HCT VFR BLD AUTO: 42 % (ref 35–47)
HGB BLD-MCNC: 13.7 G/DL (ref 11.7–15.7)
IMM GRANULOCYTES # BLD: 0 10E9/L (ref 0–0.4)
IMM GRANULOCYTES NFR BLD: 0.2 %
LYMPHOCYTES # BLD AUTO: 1.2 10E9/L (ref 0.8–5.3)
LYMPHOCYTES NFR BLD AUTO: 23.1 %
MCH RBC QN AUTO: 31.2 PG (ref 26.5–33)
MCHC RBC AUTO-ENTMCNC: 32.6 G/DL (ref 31.5–36.5)
MCV RBC AUTO: 96 FL (ref 78–100)
MONOCYTES # BLD AUTO: 0.6 10E9/L (ref 0–1.3)
MONOCYTES NFR BLD AUTO: 12.9 %
NEUTROPHILS # BLD AUTO: 3.1 10E9/L (ref 1.6–8.3)
NEUTROPHILS NFR BLD AUTO: 62.4 %
NRBC # BLD AUTO: 0 10*3/UL
NRBC BLD AUTO-RTO: 0 /100
PLATELET # BLD AUTO: 201 10E9/L (ref 150–450)
POTASSIUM SERPL-SCNC: 3.8 MMOL/L (ref 3.4–5.3)
RBC # BLD AUTO: 4.39 10E12/L (ref 3.8–5.2)
SODIUM SERPL-SCNC: 143 MMOL/L (ref 133–144)
WBC # BLD AUTO: 5 10E9/L (ref 4–11)

## 2020-12-31 PROCEDURE — 99284 EMERGENCY DEPT VISIT MOD MDM: CPT | Mod: 25

## 2020-12-31 PROCEDURE — 80048 BASIC METABOLIC PNL TOTAL CA: CPT | Performed by: EMERGENCY MEDICINE

## 2020-12-31 PROCEDURE — 99207 PR NO CHARGE LOS: CPT | Performed by: FAMILY MEDICINE

## 2020-12-31 PROCEDURE — 71045 X-RAY EXAM CHEST 1 VIEW: CPT

## 2020-12-31 PROCEDURE — 85025 COMPLETE CBC W/AUTO DIFF WBC: CPT | Performed by: EMERGENCY MEDICINE

## 2020-12-31 PROCEDURE — 85379 FIBRIN DEGRADATION QUANT: CPT | Performed by: EMERGENCY MEDICINE

## 2020-12-31 PROCEDURE — 250N000013 HC RX MED GY IP 250 OP 250 PS 637: Performed by: EMERGENCY MEDICINE

## 2020-12-31 RX ORDER — IBUPROFEN 600 MG/1
600 TABLET, FILM COATED ORAL ONCE
Status: COMPLETED | OUTPATIENT
Start: 2020-12-31 | End: 2020-12-31

## 2020-12-31 RX ADMIN — IBUPROFEN 600 MG: 600 TABLET, FILM COATED ORAL at 16:53

## 2020-12-31 ASSESSMENT — ENCOUNTER SYMPTOMS
SORE THROAT: 1
COUGH: 1
RHINORRHEA: 1
ABDOMINAL PAIN: 1
DIARRHEA: 1
MYALGIAS: 1
SHORTNESS OF BREATH: 1
CHILLS: 1

## 2020-12-31 NOTE — ED TRIAGE NOTES
Patient presents to the ED following a virtual visit. Patient states that for 2 days has had body aches, chills, diarrhea and loss of smell. Additionally reports coughing and SOB last night. Tested for COVID yesterday, results pending.

## 2020-12-31 NOTE — ED AVS SNAPSHOT
[facility] Emergency Dept  [address]  [facility] [place] [address]  Phone: [phone]  Fax: [phone]                                    Bhavani Calderon   MRN: [id number]    Department: [facility] Emergency Dept   Date of Visit: 12/31/2020           After Visit Summary Signature Page    I have received my discharge instructions, and my questions have been answered. I have discussed any challenges I see with this plan with the nurse or doctor.    ..........................................................................................................................................  Patient/Patient Representative Signature      ..........................................................................................................................................  Patient Representative Print Name and Relationship to Patient    ..................................................               ................................................  Date                                   Time    ..........................................................................................................................................  Reviewed by Signature/Title    ...................................................              ..............................................  Date                                               Time          22EPIC Rev 08/18

## 2020-12-31 NOTE — ED PROVIDER NOTES
History   Chief Complaint  Suspected Covid    A  was used.      Bhavani Calderon is a 55 year old female with a history of hyperlipidemia who presents for evaluation of several complaints, including a cough, shortness of breath, myalgias, rhinorrhea, sore throat, nausea, abdominal pain, chills, diarrhea, and loss of smell. The patient reports that the cough and shortness of breath started yesterday, while the other symptoms have been ongoing for the past two days. She was tested for COVID yesterday and these results are currently pending. Following a virtual urgency care visit, she was recommended to come to the ED for evaluation.    Review of Systems   Constitutional: Positive for chills.   HENT: Positive for rhinorrhea and sore throat.    Respiratory: Positive for cough and shortness of breath.    Gastrointestinal: Positive for abdominal pain and diarrhea.   Musculoskeletal: Positive for myalgias.   All other systems reviewed and are negative.    Allergies  Penicillins    Medications  Fosamax  Atrovent  Synthroid  Naproxen    Past Medical History  Hypothyroidism  Grave's disease  Menopausal state  Rotator cuff syndrome  Hyperlipidemia  Closed compression fracture of the first lumbar vertebra  Compression fracture of the T12 vertebra  Osteoporosis  Lumbar DDD    Past Surgical History  Tubal ligation    Family History  Hepatitis  Diabetes  Glaucoma    Social History  Presents to the ED alone.     Physical Exam     Patient Vitals for the past 24 hrs:   BP Temp Pulse Resp SpO2   12/31/20 1946 -- -- -- 18 --   12/31/20 1552 (!) 146/73 98.5  F (36.9  C) 62 18 98 %     Physical Exam  Constitutional: Vital signs reviewed as above.   Head: No external signs of trauma noted.  Eyes: Pupils are equal, round, and reactive to light.   Neck: No JVD noted  Cardiovascular: Normal rate, regular rhythm and normal heart sounds.  No murmur heard. Equal B/L peripheral pulses.  Pulmonary/Chest: Effort normal and  breath sounds normal. No respiratory distress. Patient has no wheezes. Patient has no rales.   Gastrointestinal: Soft. There is no tenderness.   Musculoskeletal/Extremities: No edema noted. Normal tone.  Neurological: Patient is alert and oriented to person, place, and time.   Skin: Skin is warm and dry. There is no diaphoresis noted.   Psychiatric: The patient appears calm.      Emergency Department Course   Imaging:  XR Chest Portable 1 View:   Negative chest. As per radiology.     Laboratory:  CBC: WBC: 5.0, HGB: 13.7, PLT: 201  BMP: Chloride 111 (H), Anion gap 2 (L), o/w WNL (Creatinine: 0.56)  D dimer: 0.3    Emergency Department Course:  Reviewed:  I reviewed nursing notes, vitals and past medical history    Assessments:  1631 I physically examined the patient as documented above.    ED Course as of Dec 31 2103   Thu Dec 31, 2020   1924 Rechecked via iPad.  Patient is stable.  She states that she found out she is positive for Covid.  We discussed discharge with pulse ox and reasons to return.        Interventions:  1653 Advil 600 mg PO    Disposition:  The patient was discharged to home.     Impression & Plan   Medical Decision Making:  This 55-year-old female patient presents to the ED due to concerns for COVID-19.  Please see the HPI and exam for specifics.  The patient said that she was tested yesterday and the results are pending.  During her time in the ED she informed me that she found out her result is positive.  She remained otherwise well with a normal pulse ox.  Her work-up did include a D-dimer which, fortunately, was negative.  Chest x-ray does not show pneumonia or concerns for pneumothorax.  At this time, I believe she can be discharged.  Patient should follow closely in the outpatient setting and we will also send her home with a get well Loop as well as a pulse ox for monitoring.  She should return with any new or worsening symptoms.  Anticipatory guidance given prior to  discharge.    Diagnosis:    ICD-10-CM    1. Infection due to 2019 novel coronavirus  U07.1 COVID-19 [facility] Referral     Care Coordination Referral     Scribe Disclosure:  I, Tashi Gipson, am serving as a scribe at 3:55 PM on 12/31/2020 to document services personally performed by Jaspal Ivey DO based on my observations and the provider's statements to me.      Jaspal Ivey DO  12/31/20 2107

## 2020-12-31 NOTE — PROGRESS NOTES
"The patient has been notified of following:     \"This telephone or video visit will be conducted via a call between you and your physician/provider. We have found that certain health care needs can be provided without the need for a physical exam.  This service lets us provide the care you need with a short phone conversation.  If a prescription is necessary we can send it directly to your pharmacy.  If lab work is needed we can place an order for that and you can then stop by our lab to have the test done at a later time.    Telephone or video visits are billed at different rates depending on your insurance coverage. During this emergency period, for some insurers they may be billed the same as an in-person visit.  Please reach out to your insurance provider with any questions.    Patient has given verbal consent for Telephone or video visit?  Yes  Subjective   HPI    History obtained with the help of Julissa Staton  Patient is worried about symptoms of covid     2 days started with a lot of cough congestion body aches   Feeling cold.   Pinching in the back by the lungs - described as back hurts when she is laying down - and also by her ribs both sides - ribs she thinks is from coughing  Back pain - intermediate not sure because she's been laying down for a while or excess coughing - worse when on the side   The last couple of days feeling congested in her chest  Last night couldn't breathe and had to sleep sitting up.   Diarrhea  And loss of smell    Patient Active Problem List   Diagnosis     CARDIOVASCULAR SCREENING; LDL GOAL LESS THAN 100     Seasonal allergic rhinitis     Rotator cuff syndrome     Hypothyroidism due to Hashimoto's thyroiditis     Closed compression fracture of first lumbar vertebra, initial encounter     Compression fracture of T12 vertebra (H)     Localized osteoporosis with current pathological fracture, sequela     Lumbar degenerative disc disease     Past Surgical History: "   Procedure Laterality Date     COLONOSCOPY N/A 4/7/2017    Procedure: COLONOSCOPY;  Surgeon: Christos Pena MD;  Location:  GI       Social History     Tobacco Use     Smoking status: Never Smoker     Smokeless tobacco: Never Used   Substance Use Topics     Alcohol use: No     Alcohol/week: 0.0 standard drinks     Family History   Problem Relation Age of Onset     Gastrointestinal Disease Father         hepatitis, unclear etiology, may be autoimmune     Gastrointestinal Disease Brother         hepatitis, unclear etiology     Diabetes Other            Reviewed and updated as needed this visit by Provider   Allergies               Review of Systems   Constitutional, HEENT, cardiovascular, pulmonary, GI, , musculoskeletal, neuro, skin, endocrine and psych systems are negative, except as otherwise noted.       Objective   Reported vitals:  There were no vitals taken for this visit.   healthy, alert and no distress  PSYCH: Alert and oriented times 3; coherent speech, normal   rate and volume, able to articulate logical thoughts, able   to abstract reason, no tangential thoughts, no hallucinations   or delusions  Her affect is normal  RESP: No cough, no audible wheezing, able to talk in full sentences  Additional exam:  none  Remainder of exam unable to be completed due to telephone visits    Diagnostic Test Results:  Labs reviewed in Epic  none         Assessment/Plan:      ICD-10-CM    1. Shortness of breath  R06.02    2. Suspected COVID-19 virus infection  Z20.828      Vague concerns about rib and back pain  Shortness of breath episodes  Last night had episode of severe shortness of breath she could not sleep and had to sleep sitting up.  Recommend ASAP ER evaluation for rule out acute cardiorespiratory concerns, concern for PE, CHF, acute cardiac event.   She declined ambulance   Concern for covid - she had a test done yesterday still waiting on result   Risks discussed       call duration:  18 minutes  Video:  no    Ena Cohn MD

## 2021-01-01 NOTE — DISCHARGE INSTRUCTIONS
What do you do next:   Continue your home medications unless we have specifically changed them  Use the pulse oximeter as instructed.  You may take Tylenol and ibuprofen over-the-counter as directed for fever and pain.  Follow up as indicated below    When do you return: If you have uncontrollable fevers, intractable vomiting, severe shortness of breath, chest pain, lightheadedness or fainting, or any other symptoms that concern you, please return to the ED for reevaluation.    Thank you for allowing us to care for you today.

## 2021-01-02 ENCOUNTER — PATIENT OUTREACH (OUTPATIENT)
Dept: CARE COORDINATION | Facility: CLINIC | Age: 56
End: 2021-01-02

## 2021-01-02 NOTE — PROGRESS NOTES
Care Coordination ED Discharge Follow up Note  Patient on Home Virtual COVID-19 Monitoring Program, following ER visit 12/31/20.  Call made to do assessment, verify follow up appt and offer care coordination, no answer.  Left message reminding pt to schedule a virtual follow up appt with her PCP and RN will try back again tomorrow.       Yesenia Panda RN, [facility]

## 2021-01-03 NOTE — PROGRESS NOTES
"Care Coordination ED Discharge Follow up Note    ED Discharge date: 12/31/2020      Reason/Diagnosis for ED visit: COVID-19 (+ on 12/30/20), SOB, cough, myalgias, rhinorrhea, sore throat, nausea, abdominal pain, chills, diarrhea and loss of smell.      Are you feeling better, the same, or worse since your ED visit:  Pt states she's better.  Breathing better, SpO2 \"still 93-94%,\" she is checking every 4 hours.  States she has pain in her lower back when walking and coughing, but it's less than what she was experiencing when in the ER.  States she's coughing a lot, but not coughing anything up.  She has a headache and some nausea, but no vomiting or diarrhea.  Denies fevers, abdominal pain.  She is taking Robitussin Maximum Strength every 8 hours with some relief.      Were you sent home with oxygen and a pulse oximeter: YesPulse oximeter, no oxygen     Are you currently utilizing the oxygen?  N/A    Are you currently utilizing the pulse oximeter: Yes    Do you understand how to utilize both: Yes, pulse oximeter; she was not prescribed O2    What liter flow were you instructed to use? N/A  What liter flow are you using to maintain your oxygen saturation: N/A    What type of home oxygen system do you have (*ask about portability and ability to manage a portable oxygen delivery)?  N/A    Who is your supply company? N/A    What are your current oxygen saturations:  94%     If red flag o2 sat, escalated to:    Activity:    How much activity can you do before you are SOB?  Pt states she's not doing much activity right now, primarily lying down.  Some dizziness when getting out of bed.    Have you had to reduce your activities because of dyspnea or other symptoms? Yes, primarily lying down         Follow up:    Do you have any follow up appointments scheduled with your PCP or a specialist? No    Who are you seeing and when is it scheduled: virtual visit scheduled with Dr. Eben Mitchell on 1/4/20 at 9:30am.    Do you feel like " you have a plan in place in the event of an emergency? Yes, adult children live with her    Provided patient with .[id number] via MyChart or email:  No      RN Notes:  Reviewed home care and precautions from ER discharge summary AVS.  Push fluids.  For coughing spasms warm fluids, OTC Mucinex to help thin/loosen secretions, and honey for cough as well as thinning/loosening secretions.  Tylenol/ibuprofen for fevers and body aches.  Offered CCC, pt declined.  RN called backdoor scheduling to assist pt in scheduling a virtual follow up appt; scheduled virtual visit with Dr. Eben Mitchell on 1/4/21 at 9:30am for follow up.  Reviewed reasons to return to ER.  Pt verbaliized understanding and agrees with plan.       Yesenia Panda RN, [facility]

## 2021-01-04 ENCOUNTER — VIRTUAL VISIT (OUTPATIENT)
Dept: FAMILY MEDICINE | Facility: CLINIC | Age: 56
End: 2021-01-04
Payer: COMMERCIAL

## 2021-01-04 DIAGNOSIS — U07.1 2019 NOVEL CORONAVIRUS DISEASE (COVID-19): Primary | ICD-10-CM

## 2021-01-04 PROCEDURE — 99214 OFFICE O/P EST MOD 30 MIN: CPT | Mod: TEL | Performed by: FAMILY MEDICINE

## 2021-01-04 RX ORDER — BENZONATATE 200 MG/1
200 CAPSULE ORAL 3 TIMES DAILY PRN
Qty: 30 CAPSULE | Refills: 1 | Status: SHIPPED | OUTPATIENT
Start: 2021-01-04 | End: 2021-01-29

## 2021-01-04 NOTE — PROGRESS NOTES
Bhavani Calderon is a 55 year old female who is being evaluated via a billable telephone visit.      What phone number would you like to be contacted at? [phone]  How would you like to obtain your AVS? [facility]  Assessment & Plan   Problem List Items Addressed This Visit     2019 novel coronavirus disease (COVID-19) - Primary     Recent ED visit with reported positive Covid test not in system.  No pneumonia.  Nonproductive  cough is prominent.  No history of inhaler use.  Prescribe antitussive as well as Respimat.  O2 sats 92% and above         Relevant Medications    benzonatate (TESSALON) 200 MG capsule    ipratropium-albuterol (COMBIVENT RESPIMAT)  MCG/ACT inhaler           Review of prior external note(s) from - ED EXT COVID test reported                               Return in about 1 week (around 1/11/2021) for virtual visit.    Eben Mitchell MD  [facility]    Subjective     Bhavani Calderon is a 55 year old female who presents to clinic today for the following health issues     HPI       ED/UC Followup:    Facility: [facility] Emergency Dept  Date of visit: 12/31/20  Reason for visit: covid-19   Current Status: pt states she is still feeling sick        O2 sats remained satisfactory per patient report.      Review of Systems   No fevers.  Chest aches with prolonged coughing    Objective           Vitals:  No vitals were obtained today due to virtual visit.    Physical Exam     PSYCH: Alert and oriented times 3; coherent speech, normal   rate and volume, able to articulate logical thoughts, able   to abstract reason, no tangential thoughts, no hallucinations   or delusions  Her affect is normal  RESP: No cough, no audible wheezing, able to talk in full sentences  Remainder of exam unable to be completed due to telephone visits                Phone call duration: 8  minutes

## 2021-01-05 PROBLEM — U07.1 2019 NOVEL CORONAVIRUS DISEASE (COVID-19): Status: ACTIVE | Noted: 2021-01-05

## 2021-01-10 DIAGNOSIS — E06.3 HYPOTHYROIDISM DUE TO HASHIMOTO'S THYROIDITIS: ICD-10-CM

## 2021-01-11 RX ORDER — LEVOTHYROXINE SODIUM 75 UG/1
75 TABLET ORAL DAILY
Qty: 90 TABLET | Refills: 0 | Status: SHIPPED | OUTPATIENT
Start: 2021-01-11 | End: 2021-04-20

## 2021-01-11 NOTE — TELEPHONE ENCOUNTER
Prescription approved per [facility] Refill Protocol.    Jesse Barajas RN

## 2021-01-29 ENCOUNTER — ANCILLARY PROCEDURE (OUTPATIENT)
Dept: GENERAL RADIOLOGY | Facility: CLINIC | Age: 56
End: 2021-01-29
Attending: PHYSICIAN ASSISTANT
Payer: COMMERCIAL

## 2021-01-29 ENCOUNTER — OFFICE VISIT (OUTPATIENT)
Dept: FAMILY MEDICINE | Facility: CLINIC | Age: 56
End: 2021-01-29
Payer: COMMERCIAL

## 2021-01-29 VITALS
DIASTOLIC BLOOD PRESSURE: 80 MMHG | SYSTOLIC BLOOD PRESSURE: 126 MMHG | BODY MASS INDEX: 20.41 KG/M2 | OXYGEN SATURATION: 99 % | WEIGHT: 108 LBS | HEART RATE: 53 BPM | RESPIRATION RATE: 16 BRPM | TEMPERATURE: 97.9 F

## 2021-01-29 DIAGNOSIS — M80.80XS LOCALIZED OSTEOPOROSIS WITH CURRENT PATHOLOGICAL FRACTURE, SEQUELA: ICD-10-CM

## 2021-01-29 DIAGNOSIS — U07.1 2019 NOVEL CORONAVIRUS DISEASE (COVID-19): Primary | ICD-10-CM

## 2021-01-29 DIAGNOSIS — R05.8 POST-VIRAL COUGH SYNDROME: ICD-10-CM

## 2021-01-29 DIAGNOSIS — M54.6 ACUTE MIDLINE THORACIC BACK PAIN: ICD-10-CM

## 2021-01-29 PROCEDURE — 72070 X-RAY EXAM THORAC SPINE 2VWS: CPT | Performed by: RADIOLOGY

## 2021-01-29 PROCEDURE — 99214 OFFICE O/P EST MOD 30 MIN: CPT | Performed by: PHYSICIAN ASSISTANT

## 2021-01-29 PROCEDURE — 71046 X-RAY EXAM CHEST 2 VIEWS: CPT | Performed by: RADIOLOGY

## 2021-01-29 RX ORDER — FLUTICASONE PROPIONATE 50 MCG
SPRAY, SUSPENSION (ML) NASAL
COMMUNITY
Start: 2020-05-21 | End: 2022-11-21

## 2021-01-29 RX ORDER — PREDNISONE 20 MG/1
40 TABLET ORAL DAILY
Qty: 10 TABLET | Refills: 0 | Status: SHIPPED | OUTPATIENT
Start: 2021-01-29 | End: 2021-02-03

## 2021-01-29 NOTE — PATIENT INSTRUCTIONS
Take prednisone as prescribed.    Can use Mucinex as needed for cough.     Follow up in 1 month if fatigue not improving.

## 2021-01-29 NOTE — PROGRESS NOTES
Assessment & Plan     2019 novel coronavirus disease (COVID-19)  Patient was diagnosed with COVID about a month ago. She is feeling improved but continues to have a cough and pain in the thoracic back.  X-ray obtained for further evaluation. Negative for pneumonia.  Most likely cough is post viral. Discussed options including prednisone vs inhaled steroids. She preferred oral prednisone which was prescribed. Common side effects discussed with patient.  - XR Chest 2 Views    Post-viral cough syndrome  As noted above.  - XR Chest 2 Views  - predniSONE (DELTASONE) 20 MG tablet; Take 2 tablets (40 mg) by mouth daily for 5 days    Acute midline thoracic back pain  Patient has history of osteoporosis. X-ray obtained to rule out compression fracture. This was negative for fracture.  - XR Thoracic Spine 2 Views    Localized osteoporosis with current pathological fracture, sequela  As noted above  - XR Thoracic Spine 2 Views    Patient Instructions   Take prednisone as prescribed.    Can use Mucinex as needed for cough.     Follow up in 1 month if fatigue not improving.      Return if symptoms worsen or fail to improve.    Cara Perkins PA-C  [facility] is a 55 year old who presents to clinic today for the following health issues  History of Present Illness       Back Pain:  She presents for follow up of back pain. Patient's back pain is a recurring problem.  Location of back pain:  Right middle of back and left middle of back  Description of back pain: sharp  Back pain spreads: right buttocks    Since patient first noticed back pain, pain is: always present, but gets better and worse  Does back pain interfere with her job:  No      She eats 2-3 servings of fruits and vegetables daily.She consumes 1 sweetened beverage(s) daily.She exercises with enough effort to increase her heart rate 20 to 29 minutes per day.  She exercises with enough effort to increase her  heart rate 4 days per week.   She is taking medications regularly.       Concern for COVID-19  About how many days ago did these symptoms start? 1 month ago   Is this your first visit for this illness? No   How would you describe your symptoms since your last visit? My symptoms have improved from last time but still experiencing fatigue, cough and back pain    In the 14 days before your symptoms started, have you had close contact with someone with COVID-19 (Coronavirus)? No  Do you have a fever or chills? No  Are you having new or worsening difficulty breathing? No  Do you have new or worsening cough? Yes, it's a dry cough.  Cough present with talking and sometimes after eating.  Have you had any new or unexplained body aches? No    Have you experienced any of the following NEW symptoms?    Headache: YES- when she is coughing    Sore throat: No    Loss of taste or smell: No    Chest pain: YES pressure in the chest     Diarrhea: No    Rash: No  What treatments have you tried? Tessalon, INHALER    Who do you live with? DAUGHTER AND SON   Are you, or a household member, a healthcare worker or a ? No  Do you live in a nursing home, group home, or shelter? No  Do you have a way to get food/medications if quarantined? Yes     Very painful in back still. Patient has history of compression fracture and osteoporosis.              Review of Systems   GENERAL:  No fevers  RESP:  No shortness of breath  MUSCULOSKELETAL: As noted in HPI        Objective    /80 (BP Location: Right arm, Patient Position: Chair, Cuff Size: Adult Regular)   Pulse 53   Temp 97.9  F (36.6  C) (Oral)   Resp 16   Wt 49 kg (108 lb)   LMP 03/31/2006   SpO2 99%   BMI 20.41 kg/m    Body mass index is 20.41 kg/m .  Physical Exam   GENERAL: No acute distress  HEENT: Normocephalic  CARDIAC: Regular rate and rhythm. No murmurs.  PULMONARY: Faint crackles in the lower lobes bilaterally.  MUSCULOSKELETAL: No significant midline  tenderness.  NEURO: Alert and non-focal        Results for orders placed or performed in visit on 01/29/21 (from the past 24 hour(s))   XR Chest 2 Views    Narrative    XR CHEST 2 VW 1/29/2021 8:39 AM    HISTORY: 2019 novel coronavirus disease (COVID-19); Post-viral cough  syndrome    COMPARISON: 12/31/2020      Impression    IMPRESSION: No focal infiltrate, pleural effusion or pneumothorax. The  cardiac and mediastinal silhouettes are normal.    DOYLE DAMON MD   XR Thoracic Spine 2 Views    Narrative    XR THORACIC SPINE 2 VW 1/29/2021 9:03 AM     COMPARISON: None    HISTORY: History of T12 compression fracture recently worsening pain;  Acute midline thoracic back pain; Localized osteoporosis with current  pathological fracture, sequela    FINDINGS: Normal vertebral body heights and alignment. Normal  interbody spaces. Normal visualized ribs and lungs.    IRINA ORONA MD

## 2021-02-07 ENCOUNTER — HEALTH MAINTENANCE LETTER (OUTPATIENT)
Age: 56
End: 2021-02-07

## 2021-04-19 DIAGNOSIS — E06.3 HYPOTHYROIDISM DUE TO HASHIMOTO'S THYROIDITIS: ICD-10-CM

## 2021-04-20 RX ORDER — LEVOTHYROXINE SODIUM 75 UG/1
75 TABLET ORAL DAILY
Qty: 90 TABLET | Refills: 0 | Status: SHIPPED | OUTPATIENT
Start: 2021-04-20 | End: 2021-08-17

## 2021-04-20 NOTE — TELEPHONE ENCOUNTER
Routing refill request to provider for review/approval because:  Labs not current:  TSH    Visit is up to date. RN will issue 90 day renetta. Please advise on labs.     Kasie Rubio RN   [facility] -- Triage Nurse

## 2021-05-04 ENCOUNTER — OFFICE VISIT (OUTPATIENT)
Dept: FAMILY MEDICINE | Facility: CLINIC | Age: 56
End: 2021-05-04
Payer: COMMERCIAL

## 2021-05-04 VITALS
HEIGHT: 58 IN | RESPIRATION RATE: 18 BRPM | WEIGHT: 111.6 LBS | HEART RATE: 48 BPM | SYSTOLIC BLOOD PRESSURE: 106 MMHG | OXYGEN SATURATION: 96 % | BODY MASS INDEX: 23.43 KG/M2 | TEMPERATURE: 98.5 F | DIASTOLIC BLOOD PRESSURE: 68 MMHG

## 2021-05-04 DIAGNOSIS — Z11.4 SCREENING FOR HIV (HUMAN IMMUNODEFICIENCY VIRUS): ICD-10-CM

## 2021-05-04 DIAGNOSIS — M62.89 PELVIC FLOOR INSTABILITY: ICD-10-CM

## 2021-05-04 DIAGNOSIS — L65.0 TELOGEN EFFLUVIUM: ICD-10-CM

## 2021-05-04 DIAGNOSIS — Z12.31 VISIT FOR SCREENING MAMMOGRAM: ICD-10-CM

## 2021-05-04 DIAGNOSIS — B35.1 ONYCHOMYCOSIS: ICD-10-CM

## 2021-05-04 DIAGNOSIS — E06.3 HYPOTHYROIDISM DUE TO HASHIMOTO'S THYROIDITIS: Primary | ICD-10-CM

## 2021-05-04 PROBLEM — H15.102 EPISCLERITIS OF LEFT EYE: Status: ACTIVE | Noted: 2019-06-11

## 2021-05-04 PROBLEM — H11.002 PTERYGIUM OF LEFT EYE: Status: ACTIVE | Noted: 2019-06-11

## 2021-05-04 PROCEDURE — 84443 ASSAY THYROID STIM HORMONE: CPT | Performed by: FAMILY MEDICINE

## 2021-05-04 PROCEDURE — 99214 OFFICE O/P EST MOD 30 MIN: CPT | Performed by: FAMILY MEDICINE

## 2021-05-04 PROCEDURE — 87389 HIV-1 AG W/HIV-1&-2 AB AG IA: CPT | Performed by: FAMILY MEDICINE

## 2021-05-04 PROCEDURE — 36415 COLL VENOUS BLD VENIPUNCTURE: CPT | Performed by: FAMILY MEDICINE

## 2021-05-04 PROCEDURE — 82728 ASSAY OF FERRITIN: CPT | Performed by: FAMILY MEDICINE

## 2021-05-04 ASSESSMENT — MIFFLIN-ST. JEOR: SCORE: 995.96

## 2021-05-04 NOTE — PROGRESS NOTES
Assessment & Plan     Screening for HIV (human immunodeficiency virus)    - HIV Antigen Antibody Combo    Visit for screening mammogram  Schedule for mammogram.  - MA SCREENING DIGITAL BILAT - Future  (s+30); Future    Hypothyroidism due to Hashimoto's thyroiditis  Check TSH and refill accordingly  - TSH with free T4 reflex    Telogen effluvium  Mostly related to her recent COVID sever infection, reassurance given and advise to call back in 2 to 3 months if no improvement.   Meanwhile, check TSH and ferritin.  - Ferritin    Onychomycosis  Of the toes, discussed options, pt prefer to wait at this time, no oral medicine prescribed.     Pelvic floor instability  Discussed Kegel exercises to strengthen the pelvis floor, with I think is the main reason  For her symptoms after her chronic cough.  Follow up in 30 days if symptoms persist, sooner if symptoms worsen or new ones develops, pt may contact us over the phone for any questions or concerns.                   Return in about 1 year (around 5/4/2022) for Routine physical..    Manny Martínez MD  [facility] is a 55 year old who presents for the following health issues     HPI     Requesting Thyroid Tests    Hypothyroidism Follow-up      Since last visit, patient describes the following symptoms: constipation, fatigue and hair loss      How many servings of fruits and vegetables do you eat daily?  2-3    On average, how many sweetened beverages do you drink each day (Examples: soda, juice, sweet tea, etc.  Do NOT count diet or artificially sweetened beverages)?   0    How many days per week do you exercise enough to make your heart beat faster? 7    How many minutes a day do you exercise enough to make your heart beat faster? 20 - 29    How many days per week do you miss taking your medication? 0    After her COVID infection, she is still feeling tired, cough has gone.     Pt has been loosing a lot of hair all around  the head, noticed when she is the shower, or brush the hair, and it started 2 months ago. (pt did have covid few months ago)      Pt has been having pain in the lower abdomen, that radiate into the upper legs, and the pain worse when walking, that sometimes she has to stop walking.  This started after COVID infection, as she was coughing significantly and that pain started to get worse after that.  Admits to some constipation, with hard stool.    Review of Systems   CONSTITUTIONAL: NEGATIVE for fever, chills, change in weight  : urinary frequency and urgency.      Objective    LMP 03/31/2006   There is no height or weight on file to calculate BMI.  Physical Exam   GENERAL: healthy, alert and no distress  NECK: no adenopathy, no asymmetry, masses, or scars and thyroid normal to palpation  RESP: lungs clear to auscultation - no rales, rhonchi or wheezes  CV: regular rate and rhythm, normal S1 S2, no S3 or S4, no murmur, click or rub, no peripheral edema and peripheral pulses strong  ABDOMEN: tenderness suprapubic and bowel sounds normal  MS: no gross musculoskeletal defects noted, no edema  SKIN: onychomycosis of the big toenails bilaterally.

## 2021-05-05 LAB
FERRITIN SERPL-MCNC: 19 NG/ML (ref 8–252)
HIV 1+2 AB+HIV1 P24 AG SERPL QL IA: NONREACTIVE
TSH SERPL DL<=0.005 MIU/L-ACNC: 3.83 MU/L (ref 0.4–4)

## 2021-05-10 ENCOUNTER — ANCILLARY PROCEDURE (OUTPATIENT)
Dept: MAMMOGRAPHY | Facility: CLINIC | Age: 56
End: 2021-05-10
Payer: COMMERCIAL

## 2021-05-10 DIAGNOSIS — Z12.31 VISIT FOR SCREENING MAMMOGRAM: ICD-10-CM

## 2021-05-10 PROCEDURE — 77067 SCR MAMMO BI INCL CAD: CPT | Mod: TC | Performed by: RADIOLOGY

## 2021-05-22 DIAGNOSIS — J30.2 SEASONAL ALLERGIC RHINITIS, UNSPECIFIED TRIGGER: Primary | ICD-10-CM

## 2021-05-24 RX ORDER — CETIRIZINE HYDROCHLORIDE 10 MG/1
TABLET ORAL
Qty: 90 TABLET | Refills: 0 | Status: SHIPPED | OUTPATIENT
Start: 2021-05-24 | End: 2022-11-21

## 2021-05-24 NOTE — TELEPHONE ENCOUNTER
Routing refill request to provider for review/approval because:  Drug not active on patient's medication list    Adolfo DAVALOS RN

## 2021-08-16 DIAGNOSIS — E06.3 HYPOTHYROIDISM DUE TO HASHIMOTO'S THYROIDITIS: ICD-10-CM

## 2021-08-17 RX ORDER — LEVOTHYROXINE SODIUM 75 UG/1
TABLET ORAL
Qty: 90 TABLET | Refills: 2 | Status: SHIPPED | OUTPATIENT
Start: 2021-08-17 | End: 2021-08-17

## 2021-08-17 RX ORDER — LEVOTHYROXINE SODIUM 75 UG/1
TABLET ORAL
Qty: 90 TABLET | Refills: 2 | Status: SHIPPED | OUTPATIENT
Start: 2021-08-17 | End: 2022-10-13

## 2021-08-17 NOTE — TELEPHONE ENCOUNTER
Prescription approved per [facility] Refill Protocol, pt had TSH lab  Sarah Van RN, BSN  Message handled by CLINIC NURSE.

## 2021-09-18 ENCOUNTER — HEALTH MAINTENANCE LETTER (OUTPATIENT)
Age: 56
End: 2021-09-18

## 2021-10-04 NOTE — PROGRESS NOTES
ASSESSMENT & PLAN  Patient Instructions     1. Acute pain of left knee      -Patient has acute left knee pain likely due to cartilage and possibly medial meniscus tears  -Patient tolerated left knee intra-articular cortisone injection today without complications.  Patient was given postprocedure instructions  -Patient will start home exercise program.  Handouts were given today  -Patient will call us if she would like formal physical therapy  -Patient will follow up when pain returns  -Call direct clinic number [[phone]] at any time with questions or concerns.    Albert Yeo MD [facility] and Sports Medicine  [facility]          -----    SUBJECTIVE  Bhavani Calderon is a/an 55 year old female who is seen as a self referral for evaluation of left knee pain. The patient is seen by themselves. Patient states 1 month ago she woke up one morning and noticed increased stiffness, popping, and pain in her left knee. Pain with walking, sleeping at night, and trying to bend her knee.     Onset: 4 week(s) ago. Reports insidious onset without acute precipitating event.  Location of Pain: left knee, medial knee occasionally radiating down medial lower leg.   Rating of Pain at worst: 10/10  Rating of Pain Currently: 8/10  Worsened by: walking, pivoting  Better with: rest, activity avoidance  Treatments tried: Advil, ice  Associated symptoms: popping and clicking, swelling, stiffness  Orthopedic history: NO  Relevant surgical history: NO  Social history: social history: works at [employer]     Past Medical History:   Diagnosis Date     Acquired hypothyroidism 1/21/2016     Grave's disease     High TSI titer, s/p I-131May 2009     Hypothyroidism due to Hashimoto's thyroiditis 3/21/2017     Menopausal state      Rotator cuff syndrome 7/16/2014     Seasonal allergic rhinitis 4/11/2014     Social History     Socioeconomic History     Marital status: Single     Spouse name: Not on file      "Number of children: Not on file     Years of education: Not on file     Highest education level: Not on file   Occupational History     Not on file   Tobacco Use     Smoking status: Never Smoker     Smokeless tobacco: Never Used   Substance and Sexual Activity     Alcohol use: No     Alcohol/week: 0.0 standard drinks     Drug use: No     Sexual activity: Not Currently   Other Topics Concern     Parent/sibling w/ CABG, MI or angioplasty before 65F 55M? Not Asked   Social History Narrative     Not on file     Social Determinants of Health     Financial Resource Strain:      Difficulty of Paying Living Expenses:    Food Insecurity:      Worried About Running Out of Food in the Last Year:      Ran Out of Food in the Last Year:    Transportation Needs:      Lack of Transportation (Medical):      Lack of Transportation (Non-Medical):    Physical Activity:      Days of Exercise per Week:      Minutes of Exercise per Session:    Stress:      Feeling of Stress :    Social Connections:      Frequency of Communication with Friends and Family:      Frequency of Social Gatherings with Friends and Family:      Attends Yazidi Services:      Active Member of Clubs or Organizations:      Attends Club or Organization Meetings:      Marital Status:    Intimate Partner Violence:      Fear of Current or Ex-Partner:      Emotionally Abused:      Physically Abused:      Sexually Abused:          Patient's past medical, surgical, social, and family histories were reviewed today and no changes are noted.    REVIEW OF SYSTEMS:  10 point ROS is negative other than symptoms noted above in HPI, Past Medical History or as stated below  Constitutional: NEGATIVE for fever, chills, change in weight  Skin: NEGATIVE for worrisome rashes, moles or lesions  GI/: NEGATIVE for bowel or bladder changes  Neuro: NEGATIVE for weakness, dizziness or paresthesias    OBJECTIVE:  /74   Ht 1.473 m (4' 10\")   Wt 50.4 kg (111 lb 3.2 oz)   LMP " 03/31/2006   BMI 23.24 kg/m     General: healthy, alert and in no distress  HEENT: no scleral icterus or conjunctival erythema  Skin: no suspicious lesions or rash. No jaundice.  CV: no pedal edema  Resp: normal respiratory effort without conversational dyspnea   Psych: normal mood and affect  Gait: normal steady gait with appropriate coordination and balance  Neuro: Normal light sensory exam of lower extremity  MSK:  LEFT KNEE  Inspection:    normal alignment  Palpation:    Tender about the medial joint line. Remainder of bony and ligamentous landmarks are nontender.    Trace effusion is present    Patellofemoral crepitus is Absent  Range of Motion:     50 extension to 1200 flexion  Strength:    Quadriceps grossly intact    Extensor mechanism intact  Special Tests:    Positive: Norbert's    Negative: MCL/valgus stress (0 & 30 deg), LCL/varus stress (0 & 30 deg), Lachman's, anterior drawer, posterior drawer    Independent visualization of the below image:  Recent Results (from the past 24 hour(s))   XR Knee Standing AP Bilat [place] Bilat Lat Left    Narrative    No acute fracture, dislocation or osseous abnormalities.     Large Joint Injection/Arthocentesis: L knee joint    Date/Time: 10/8/2021 10:03 AM  Performed by: Yeo, Albert, MD  Authorized by: Yeo, Albert, MD     Indications:  Pain and osteoarthritis  Needle Size:  25 G  Guidance: landmark guided    Approach:  Lateral  Location:  Knee      Medications:  40 mg methylPREDNISolone 40 MG/ML  Outcome:  Tolerated well, no immediate complications  Procedure discussed: discussed risks, benefits, and alternatives    Consent Given by:  Patient  Timeout: timeout called immediately prior to procedure    Prep: patient was prepped and draped in usual sterile fashion            Albert Yeo MD [facility] Sports and Orthopedic Care

## 2021-10-08 ENCOUNTER — ANCILLARY PROCEDURE (OUTPATIENT)
Dept: GENERAL RADIOLOGY | Facility: CLINIC | Age: 56
End: 2021-10-08
Attending: FAMILY MEDICINE
Payer: COMMERCIAL

## 2021-10-08 ENCOUNTER — OFFICE VISIT (OUTPATIENT)
Dept: ORTHOPEDICS | Facility: CLINIC | Age: 56
End: 2021-10-08
Payer: COMMERCIAL

## 2021-10-08 VITALS
WEIGHT: 111.2 LBS | SYSTOLIC BLOOD PRESSURE: 110 MMHG | BODY MASS INDEX: 23.34 KG/M2 | HEIGHT: 58 IN | DIASTOLIC BLOOD PRESSURE: 74 MMHG

## 2021-10-08 DIAGNOSIS — M25.562 LEFT KNEE PAIN: ICD-10-CM

## 2021-10-08 DIAGNOSIS — M25.562 ACUTE PAIN OF LEFT KNEE: Primary | ICD-10-CM

## 2021-10-08 PROCEDURE — 20610 DRAIN/INJ JOINT/BURSA W/O US: CPT | Mod: LT | Performed by: FAMILY MEDICINE

## 2021-10-08 PROCEDURE — 99203 OFFICE O/P NEW LOW 30 MIN: CPT | Mod: 25 | Performed by: FAMILY MEDICINE

## 2021-10-08 PROCEDURE — 73562 X-RAY EXAM OF KNEE 3: CPT | Performed by: FAMILY MEDICINE

## 2021-10-08 RX ORDER — METHYLPREDNISOLONE ACETATE 40 MG/ML
40 INJECTION, SUSPENSION INTRA-ARTICULAR; INTRALESIONAL; INTRAMUSCULAR; SOFT TISSUE
Status: DISCONTINUED | OUTPATIENT
Start: 2021-10-08 | End: 2022-11-21

## 2021-10-08 RX ADMIN — METHYLPREDNISOLONE ACETATE 40 MG: 40 INJECTION, SUSPENSION INTRA-ARTICULAR; INTRALESIONAL; INTRAMUSCULAR; SOFT TISSUE at 10:03

## 2021-10-08 ASSESSMENT — MIFFLIN-ST. JEOR: SCORE: 994.15

## 2021-10-08 NOTE — PATIENT INSTRUCTIONS
1. Acute pain of left knee      -Patient has acute left knee pain likely due to cartilage and possibly medial meniscus tears  -Patient tolerated left knee intra-articular cortisone injection today without complications.  Patient was given postprocedure instructions  -Patient will start home exercise program.  Handouts were given today  -Patient will call us if she would like formal physical therapy  -Patient will follow up when pain returns  -Call direct clinic number [[phone]] at any time with questions or concerns.    Albert Yeo MD CAM  [place] [facility]  [facility] [place]

## 2021-10-08 NOTE — LETTER
10/8/2021         RE: Bhavani Calderon  [address] [address]        Dear Colleague,    Thank you for referring your patient, Bhavani Calderon, to the [facility] [place]. Please see a copy of my visit note below.    ASSESSMENT & PLAN  Patient Instructions     1. Acute pain of left knee      -Patient has acute left knee pain likely due to cartilage and possibly medial meniscus tears  -Patient tolerated left knee intra-articular cortisone injection today without complications.  Patient was given postprocedure instructions  -Patient will start home exercise program.  Handouts were given today  -Patient will call us if she would like formal physical therapy  -Patient will follow up when pain returns  -Call direct clinic number [[phone]] at any time with questions or concerns.    Albert Yeo MD [facility] Orthopedics and Sports Medicine  [facility]          -----    SUBJECTIVE  Bhavani Calderon is a/an 55 year old female who is seen as a self referral for evaluation of left knee pain. The patient is seen by themselves. Patient states 1 month ago she woke up one morning and noticed increased stiffness, popping, and pain in her left knee. Pain with walking, sleeping at night, and trying to bend her knee.     Onset: 4 week(s) ago. Reports insidious onset without acute precipitating event.  Location of Pain: left knee, medial knee occasionally radiating down medial lower leg.   Rating of Pain at worst: 10/10  Rating of Pain Currently: 8/10  Worsened by: walking, pivoting  Better with: rest, activity avoidance  Treatments tried: Advil, ice  Associated symptoms: popping and clicking, swelling, stiffness  Orthopedic history: NO  Relevant surgical history: NO  Social history: social history: works at [employer]     Past Medical History:   Diagnosis Date     Acquired hypothyroidism 1/21/2016     Grave's disease     High TSI titer,  s/p I-131May 2009     Hypothyroidism due to Hashimoto's thyroiditis 3/21/2017     Menopausal state      Rotator cuff syndrome 7/16/2014     Seasonal allergic rhinitis 4/11/2014     Social History     Socioeconomic History     Marital status: Single     Spouse name: Not on file     Number of children: Not on file     Years of education: Not on file     Highest education level: Not on file   Occupational History     Not on file   Tobacco Use     Smoking status: Never Smoker     Smokeless tobacco: Never Used   Substance and Sexual Activity     Alcohol use: No     Alcohol/week: 0.0 standard drinks     Drug use: No     Sexual activity: Not Currently   Other Topics Concern     Parent/sibling w/ CABG, MI or angioplasty before 65F 55M? Not Asked   Social History Narrative     Not on file     Social Determinants of Health     Financial Resource Strain:      Difficulty of Paying Living Expenses:    Food Insecurity:      Worried About Running Out of Food in the Last Year:      Ran Out of Food in the Last Year:    Transportation Needs:      Lack of Transportation (Medical):      Lack of Transportation (Non-Medical):    Physical Activity:      Days of Exercise per Week:      Minutes of Exercise per Session:    Stress:      Feeling of Stress :    Social Connections:      Frequency of Communication with Friends and Family:      Frequency of Social Gatherings with Friends and Family:      Attends Rastafari Services:      Active Member of Clubs or Organizations:      Attends Club or Organization Meetings:      Marital Status:    Intimate Partner Violence:      Fear of Current or Ex-Partner:      Emotionally Abused:      Physically Abused:      Sexually Abused:          Patient's past medical, surgical, social, and family histories were reviewed today and no changes are noted.    REVIEW OF SYSTEMS:  10 point ROS is negative other than symptoms noted above in HPI, Past Medical History or as stated below  Constitutional: NEGATIVE for  "fever, chills, change in weight  Skin: NEGATIVE for worrisome rashes, moles or lesions  GI/: NEGATIVE for bowel or bladder changes  Neuro: NEGATIVE for weakness, dizziness or paresthesias    OBJECTIVE:  /74   Ht 1.473 m (4' 10\")   Wt 50.4 kg (111 lb 3.2 oz)   LMP 03/31/2006   BMI 23.24 kg/m     General: healthy, alert and in no distress  HEENT: no scleral icterus or conjunctival erythema  Skin: no suspicious lesions or rash. No jaundice.  CV: no pedal edema  Resp: normal respiratory effort without conversational dyspnea   Psych: normal mood and affect  Gait: normal steady gait with appropriate coordination and balance  Neuro: Normal light sensory exam of lower extremity  MSK:  LEFT KNEE  Inspection:    normal alignment  Palpation:    Tender about the medial joint line. Remainder of bony and ligamentous landmarks are nontender.    Trace effusion is present    Patellofemoral crepitus is Absent  Range of Motion:     50 extension to 1200 flexion  Strength:    Quadriceps grossly intact    Extensor mechanism intact  Special Tests:    Positive: Norbert's    Negative: MCL/valgus stress (0 & 30 deg), LCL/varus stress (0 & 30 deg), Lachman's, anterior drawer, posterior drawer    Independent visualization of the below image:  Recent Results (from the past 24 hour(s))   XR Knee Standing AP Bilat [place] Bilat Lat Left    Narrative    No acute fracture, dislocation or osseous abnormalities.     Large Joint Injection/Arthocentesis: L knee joint    Date/Time: 10/8/2021 10:03 AM  Performed by: Yeo, Albert, MD  Authorized by: Yeo, Albert, MD     Indications:  Pain and osteoarthritis  Needle Size:  25 G  Guidance: landmark guided    Approach:  Lateral  Location:  Knee      Medications:  40 mg methylPREDNISolone 40 MG/ML  Outcome:  Tolerated well, no immediate complications  Procedure discussed: discussed risks, benefits, and alternatives    Consent Given by:  Patient  Timeout: timeout called immediately prior to " procedure    Prep: patient was prepped and draped in usual sterile fashion            Albert Yeo MD [facility] and Orthopedic Care        Again, thank you for allowing me to participate in the care of your patient.        Sincerely,        Albert Yeo, MD

## 2022-01-07 NOTE — PROGRESS NOTES
Progress Note - Pulmonary   Edyth Hunger 80 y o  male MRN: [id number]  Unit/Bed#: -Dylan Encounter: [id number]    Assessment & Plan:  Acute hypoxic respiratory failure  COVID-19 pneumonia with possible superimposed bacterial infection  Volume overload with likely RV dysfunction and diastolic dysfunction  [place]      · O2 requirements slowly improving from 15 to 13 L w/o NRB  Continue to monitor SpO2 and titrate as able to maintain saturations greater than 89%  · Continue treatment per moderate COVID-19 pathway  · Atorvastatin  · Dexamethasone - 0 1mg/kg Q 12H day 10  · Remdesivir x5 days completed  · Baricitinib day 9/14  · Anticoagulation:  Lovenox prophylaxis (D-dimer < 2)  · Antibiotics: cefepime completed 8 days  · Encourage self-proning, activity as tolerated, incentive spirometry  · Nephrology following  Given 1 dose of IV Lasix yesterday  Creatinine up  Continue monitor  Discussed with Dr Nan Jasmine    Please call if there are any questions over the weekend  Otherwise, we will re-evaluate patient 1/10/22    Subjective:   Patient says that he is feeling better today  Less short of breath  Objective:     Vitals: Blood pressure 126/80, pulse 80, temperature 97 5 °F (36 4 °C), resp  rate 20, weight 89 2 kg (196 lb 10 4 oz), SpO2 (!) 89 %  ,Body mass index is 29 04 kg/m²  Intake/Output Summary (Last 24 hours) at 1/7/2022 1053  Last data filed at 1/7/2022 4698  Gross per 24 hour   Intake --   Output 825 ml   Net -825 ml       Invasive Devices  Report    Peripheral Intravenous Line            Peripheral IV 01/04/22 Right;Upper;Ventral (anterior) Arm 3 days                Physical Exam:  General appearance: Alert and oriented, in no acute distress  Head: Normocephalic, without obvious abnormality, atraumatic  Eyes: EOMI  No discharge bilaterally  No scleral icterus     Neck: Supple, symmetrical, trachea midline  Lungs: Decreased breath sounds  Heart: Regular rate and rhythm, S1, S2 normal, no [facility]  Neurosurgery followup:    HPI: Ms Calderon is a 52 year old female who was seen at our clinic initially on 8/6/18 for low back pain resulting after a fall on 7/9/18.  MRI showed T12 and L1 fracture and she was ordered bracing.  She is here today for f/u.  She states she has minimal pain, on occasion experiencing low back pain when her brace first comes off.  She denies lumbar radicular pain.  She denies BLE weakness or falls since her last visit on 9/10/18.    Exam:  Constitutional:  Alert, well nourished, NAD.  HEENT: Normocephalic, atraumatic.   Pulm:  Without shortness of breath   CV:  No pitting edema of BLE.      Neurological:  Awake  Alert  Oriented x 3  Motor exam:        IP Q DF PF EHL  R   5  5   5   5    5  L   5  5   5   5    5     Able to spontaneously move L/E bilaterally  Sensation intact throughout all L/E dermatomes     Imaging:  Lumbar XR shows no new fractures, improved T12 and L1 fractures    A/P:   T12 and L1 vertebral body fractures, healed/mimimal      Ms Calderon is a 52 year old female who was seen at our clinic initially on 8/6/18 for low back pain resulting after a fall on 7/9/18.  MRI showed T12 and L1 fracture and she was ordered bracing.  She is here today for f/u.  She states she has minimal pain, on occasion experiencing low back pain when her brace first comes off.  She denies lumbar radicular pain.  She denies BLE weakness or falls since her last visit on 9/10/18.  Reviewed XR with patient and recommendation to discontinue brace, may return to work without restrictions and ok for PT.      Patient Instructions   -Schedule physical therapy  -Ok to remove brace  -Please contact the clinic if pain persists at [phone].      This visit was a visit with Dr. Pedro Nye, and I, Sugar Sanz CNP acted as a scribe.    Sugar Sanz CNP  [facility]  [address]  [address] [place] [id number]    Tel  [phone]  Pager [phone]          Addendum:  Agree with above note  Patient seen and examined  May remove TLSO brace and increase activity  He may return to work with no restrictions  We will also recommend physical therapy    Pedro Nye MD, MS, FAANS     murmur  Abdomen:  No appreciable distension or tenderness  Extremities:  No edema or tenderness  Skin: Warm and dry  Neurologic: No acute focal deficits are noted    Labs: I have personally reviewed pertinent lab results  Imaging and other studies: I have personally reviewed pertinent reports

## 2022-01-08 ENCOUNTER — HEALTH MAINTENANCE LETTER (OUTPATIENT)
Age: 57
End: 2022-01-08

## 2022-02-17 PROBLEM — S32.010A CLOSED COMPRESSION FRACTURE OF L1 VERTEBRA, INITIAL ENCOUNTER (H): Status: ACTIVE | Noted: 2018-07-25

## 2022-04-16 ENCOUNTER — HOSPITAL ENCOUNTER (EMERGENCY)
Facility: CLINIC | Age: 57
Discharge: HOME | End: 2022-04-16
Attending: EMERGENCY MEDICINE | Admitting: EMERGENCY MEDICINE
Payer: OTHER MISCELLANEOUS

## 2022-04-16 VITALS
DIASTOLIC BLOOD PRESSURE: 71 MMHG | BODY MASS INDEX: 22.78 KG/M2 | SYSTOLIC BLOOD PRESSURE: 130 MMHG | HEART RATE: 60 BPM | WEIGHT: 109 LBS | OXYGEN SATURATION: 99 % | RESPIRATION RATE: 14 BRPM | TEMPERATURE: 97.5 F

## 2022-04-16 DIAGNOSIS — H57.11 ACUTE RIGHT EYE PAIN: ICD-10-CM

## 2022-04-16 PROCEDURE — 99282 EMERGENCY DEPT VISIT SF MDM: CPT

## 2022-04-16 RX ORDER — TETRACAINE HYDROCHLORIDE 5 MG/ML
2 SOLUTION OPHTHALMIC ONCE
Status: DISCONTINUED | OUTPATIENT
Start: 2022-04-16 | End: 2022-04-16 | Stop reason: HOSPADM

## 2022-04-16 RX ORDER — PROPARACAINE HYDROCHLORIDE 5 MG/ML
2 SOLUTION/ DROPS OPHTHALMIC ONCE
Status: DISCONTINUED | OUTPATIENT
Start: 2022-04-16 | End: 2022-04-16 | Stop reason: CLARIF

## 2022-04-16 ASSESSMENT — ENCOUNTER SYMPTOMS: EYE PAIN: 1

## 2022-04-16 ASSESSMENT — VISUAL ACUITY
OS: 20/20
OD: 20/25

## 2022-04-16 NOTE — ED TRIAGE NOTES
Pt comes in for injury to rt eye. Pt was pushing a garbage can that bumped a cardboard box and the corner of the box poked her in the eye. Eye was watery at first and now feels hot and painful, pt denies vision changes.

## 2022-04-16 NOTE — ED PROVIDER NOTES
History   Chief Complaint:  No chief complaint on file.       The history is provided by the patient.      Bhavani Calderon is a 56 year old female with history of Grave's disease who presents with a family member for evaluation of a right eye injury that occurred while at work today. Earlier today, the patient was pushing a garbage can that bumped into a cardboard box. The corner of the cardboard box poked her in the right eye. Initially, her vision was blurry and her eye watered, but now feels hot and painful. The blurriness also subsided and she denies any other vision changes. She also normally wears reading glasses, but no contacts.     Of note, the patient's visual acuity is 20/25 for her right eye and 20/20 for her left.     Review of Systems   Eyes: Positive for pain (right eye) and visual disturbance (blurry vision in right eye, now resolved).     Allergies:  Pcn [Penicillins]    Medications:  cetirizine (ZYRTEC) 10 MG tablet  fluticasone (FLONASE) 50 MCG/ACT nasal spray  levothyroxine (SYNTHROID/LEVOTHROID) 75 MCG tablet    Past Medical History:     Grave's disease   Hypothyroidism due to Hashimoto's thyroiditis   Menopausal state    Seasonal allergic rhinitis  Rotator cuff syndrome  Closed compression fracture of first lumbar vertebra, initial encounter  Compression fracture of T12 vertebra   Localized osteoporosis with current pathological fracture, sequela  Lumbar degenerative disc disease  2019 novel coronavirus disease (COVID-19)  Episcleritis of left eye  Pterygium of left eye  Onychomycosis    Conjunctival hemorrhage, left  Dry eyes, bilateral     Past Surgical History:    Colonoscopy   Tubal ligation      Family History:    Gastrointestinal disease (father, brother)    Social History:  She presents to the emergency department with a family member.     Physical Exam     Patient Vitals for the past 24 hrs:   BP Temp Temp src Pulse Resp SpO2 Weight   04/16/22 1145 134/75 97.5  F (36.4  C) Temporal  57 18 99 % 49.4 kg (109 lb)       Physical Exam  General/Appearance: appears stated age, well-groomed, appears comfortable  Eyes: EOMI, no scleral injection, no icterus, PERRL, R 20/25, L 20/20, no fluorescein uptake  ENT: MMM  Neck: supple, nl ROM, no stiffness  Cardiovascular: RRR, nl S1S2, no m/r/g, 2+ pulses in all 4 extremities, cap refill <2sec  Respiratory: CTAB, good air movement throughout, no wheezes/rhonchi/rales, no increased WOB, no retractions  Skin: warm and well-perfused, no rash, no edema, no ecchymosis, nl turgor  Neuro: GCS 15, alert and oriented, no gross focal neuro deficits      Emergency Department Course     Procedures  none    Emergency Department Course:             Reviewed:  I reviewed nursing notes, vitals, past medical history and Care Everywhere    Assessments:  1215 I obtained history and examined the patient as noted above.     Interventions:  1200 proparacaine (ALCAINE) 0.5 % ophthalmic solution 2 drop, right eye    Disposition:  The patient was discharged to home.     Impression & Plan     Medical Decision Making:  This patient is a pleasant 56-year-old female who presents after she was poked in her right eye with a cardboard box while at work.  Visual acuity is relatively unremarkable.  She has no visual changes although she does have slight blurriness at the very beginning after the injury.  She denies any current blurriness, photophobia, floaters.  Fluorescein exam was unremarkable.  Her pain resolved after the tetracaine drops.  I suspect she has a superficial abrasion to her eye but as I am not able to appreciate uptake I do not think she needs antibiotics.  She does not wear contact lenses.  She will be discharged home with supportive care.    Diagnosis:    ICD-10-CM    1. Acute right eye pain  H57.11        Discharge Medications:  New Prescriptions    No medications on file       Scribe Disclosure:  Anaya ADAN, am serving as a scribe at 11:54 AM on 4/16/2022 to  document services personally performed by Sarai Edwards MD based on my observations and the provider's statements to me.        Sarai Edwards MD  04/16/22 1228       Sarai Edwards MD  04/16/22 0767

## 2022-10-12 DIAGNOSIS — E06.3 HYPOTHYROIDISM DUE TO HASHIMOTO'S THYROIDITIS: ICD-10-CM

## 2022-10-13 RX ORDER — LEVOTHYROXINE SODIUM 75 UG/1
TABLET ORAL
Qty: 60 TABLET | Refills: 0 | Status: SHIPPED | OUTPATIENT
Start: 2022-10-13 | End: 2022-11-21

## 2022-10-13 NOTE — TELEPHONE ENCOUNTER
Please call, pt needs to be seen.  Manny Martínez MD  [facility]  [phone]

## 2022-10-13 NOTE — TELEPHONE ENCOUNTER
Routing refill request to provider for review/approval because:  Labs not current:  TSH  Patient needs to be seen because it has been more than 1 year since last office visit.    Adolfo DAVALOS RN 10/13/2022 at 2:51 PM

## 2022-11-20 ENCOUNTER — HEALTH MAINTENANCE LETTER (OUTPATIENT)
Age: 57
End: 2022-11-20

## 2022-11-21 ENCOUNTER — OFFICE VISIT (OUTPATIENT)
Dept: FAMILY MEDICINE | Facility: CLINIC | Age: 57
End: 2022-11-21
Payer: COMMERCIAL

## 2022-11-21 VITALS
SYSTOLIC BLOOD PRESSURE: 134 MMHG | WEIGHT: 109.8 LBS | OXYGEN SATURATION: 97 % | HEART RATE: 50 BPM | DIASTOLIC BLOOD PRESSURE: 70 MMHG | HEIGHT: 58 IN | BODY MASS INDEX: 23.05 KG/M2

## 2022-11-21 DIAGNOSIS — E06.3 HYPOTHYROIDISM DUE TO HASHIMOTO'S THYROIDITIS: ICD-10-CM

## 2022-11-21 DIAGNOSIS — B35.3 TINEA PEDIS OF BOTH FEET: ICD-10-CM

## 2022-11-21 DIAGNOSIS — M80.80XS LOCALIZED OSTEOPOROSIS WITH CURRENT PATHOLOGICAL FRACTURE, SEQUELA: ICD-10-CM

## 2022-11-21 DIAGNOSIS — B35.1 ONYCHOMYCOSIS: ICD-10-CM

## 2022-11-21 DIAGNOSIS — Z23 HIGH PRIORITY FOR 2019-NCOV VACCINE: Primary | ICD-10-CM

## 2022-11-21 DIAGNOSIS — Z13.220 SCREENING FOR HYPERLIPIDEMIA: ICD-10-CM

## 2022-11-21 LAB
CHOLEST SERPL-MCNC: 236 MG/DL
HDLC SERPL-MCNC: 78 MG/DL
LDLC SERPL CALC-MCNC: 139 MG/DL
NONHDLC SERPL-MCNC: 158 MG/DL
TRIGL SERPL-MCNC: 94 MG/DL
TSH SERPL DL<=0.005 MIU/L-ACNC: 1.92 UIU/ML (ref 0.3–4.2)

## 2022-11-21 PROCEDURE — 99214 OFFICE O/P EST MOD 30 MIN: CPT | Mod: 25 | Performed by: FAMILY MEDICINE

## 2022-11-21 PROCEDURE — 36415 COLL VENOUS BLD VENIPUNCTURE: CPT | Performed by: FAMILY MEDICINE

## 2022-11-21 PROCEDURE — 91313 COVID-19,PF,MODERNA BIVALENT: CPT | Performed by: FAMILY MEDICINE

## 2022-11-21 PROCEDURE — 90750 HZV VACC RECOMBINANT IM: CPT | Performed by: FAMILY MEDICINE

## 2022-11-21 PROCEDURE — 0134A COVID-19,PF,MODERNA BIVALENT: CPT | Performed by: FAMILY MEDICINE

## 2022-11-21 PROCEDURE — 80061 LIPID PANEL: CPT | Performed by: FAMILY MEDICINE

## 2022-11-21 PROCEDURE — 84443 ASSAY THYROID STIM HORMONE: CPT | Performed by: FAMILY MEDICINE

## 2022-11-21 PROCEDURE — 90471 IMMUNIZATION ADMIN: CPT | Performed by: FAMILY MEDICINE

## 2022-11-21 RX ORDER — CLOTRIMAZOLE 1 %
CREAM (GRAM) TOPICAL 2 TIMES DAILY
Qty: 60 G | Refills: 0 | Status: SHIPPED | OUTPATIENT
Start: 2022-11-21 | End: 2023-10-19

## 2022-11-21 RX ORDER — LEVOTHYROXINE SODIUM 75 UG/1
75 TABLET ORAL DAILY
Qty: 60 TABLET | Refills: 0 | Status: CANCELLED | OUTPATIENT
Start: 2022-11-21

## 2022-11-21 RX ORDER — LEVOTHYROXINE SODIUM 75 UG/1
75 TABLET ORAL DAILY
Qty: 90 TABLET | Refills: 3 | Status: SHIPPED | OUTPATIENT
Start: 2022-11-21 | End: 2024-01-24

## 2022-11-21 NOTE — PROGRESS NOTES
Assessment & Plan     Screening for hyperlipidemia    - Lipid panel reflex to direct LDL Non-fasting; Future    Hypothyroidism due to Hashimoto's thyroiditis  Check TSH and refill accordingly.   - TSH WITH FREE T4 REFLEX; Future    Localized osteoporosis with current pathological fracture, sequela  Will order Dexa scan of the bone, may require longer treatment given her hx of Vertebral fracture.   - DX Hip/Pelvis/Spine; Future    Tinea pedis of both feet  Start on clotrimazole 1% twice daily.    Onychomycosis  Discussed side effects of medications, opted not to treat given the high risk of side effects and adverse events.                   No follow-ups on file.   Follow-up Visit   Expected date:  Nov 21, 2023 (Approximate)      Follow Up Appointment Details:     Follow-up with whom?: Me    Follow-Up for what?: Adult Preventive    How?: In Person    Is this an as-needed follow-up?: Netta Martínez MD  [facility] [place]    Subjective   Bhavani is a 57 year old, presenting for the following health issues:  Thyroid Disease      History of Present Illness       Hypothyroidism:     Since last visit, patient describes the following symptoms::  Fatigue and Hair loss    She eats 2-3 servings of fruits and vegetables daily.She consumes 3 sweetened beverage(s) daily.She exercises with enough effort to increase her heart rate 10 to 19 minutes per day.  She exercises with enough effort to increase her heart rate 3 or less days per week. She is missing 1 dose(s) of medications per week.     PT said that she has been feeling cold after she eats, that occurred after she had covid in January of 2022.           Rash      Duration: 2 years ago.    Description  Location: on the feet, and along with all  toenails involved.  Itching: no    Intensity:  moderate    Accompanying signs and symptoms: None    History (similar episodes/previous evaluation): yes, seen previously and treatment was held  "due to meds side effects.    Precipitating or alleviating factors:  New exposures:  None  Recent travel: no      Therapies tried and outcome: none         Osteoporosis:  Started in 2018, after pt had compression fracture of T12, and she was started on alendronate, and took it for 3 years then she stopped it in early 2021 according to patient, since then pt is still complain    Review of Systems   CONSTITUTIONAL: NEGATIVE for fever, chills, change in weight      Objective    /70 (BP Location: Right arm, Patient Position: Sitting, Cuff Size: Adult Regular)   Pulse 50   Ht 1.473 m (4' 10\")   Wt 49.8 kg (109 lb 12.8 oz)   LMP 03/31/2006   SpO2 97%   BMI 22.95 kg/m    Body mass index is 22.95 kg/m .  Physical Exam   GENERAL: healthy, alert and no distress  NECK: no adenopathy, no asymmetry, masses, or scars and thyroid normal to palpation  RESP: lungs clear to auscultation - no rales, rhonchi or wheezes  CV: regular rate and rhythm, normal S1 S2, no S3 or S4, no murmur, click or rub, no peripheral edema and peripheral pulses strong  SKIN: rough skin on the bottom of the feet, along with hyperkeratosis and mild ertyeham on the anterior soles, there is onychomycosis (thickening and discoloration ) of all the toenails.                     "

## 2022-12-14 ENCOUNTER — ANCILLARY PROCEDURE (OUTPATIENT)
Dept: BONE DENSITY | Facility: CLINIC | Age: 57
End: 2022-12-14
Attending: FAMILY MEDICINE
Payer: COMMERCIAL

## 2022-12-14 PROCEDURE — 77080 DXA BONE DENSITY AXIAL: CPT | Performed by: INTERNAL MEDICINE

## 2022-12-17 ENCOUNTER — TELEPHONE (OUTPATIENT)
Dept: FAMILY MEDICINE | Facility: CLINIC | Age: 57
End: 2022-12-17

## 2022-12-17 DIAGNOSIS — M80.80XS LOCALIZED OSTEOPOROSIS WITH CURRENT PATHOLOGICAL FRACTURE, SEQUELA: Primary | ICD-10-CM

## 2022-12-17 NOTE — TELEPHONE ENCOUNTER
Dexa scan is showing osteoporosis, and given her hx of vertebral fracture, it is considered sever.have the patient been on Fosamax before, it is a pill taken once weekly?

## 2022-12-19 RX ORDER — ALENDRONATE SODIUM 70 MG/1
70 TABLET ORAL
Qty: 12 TABLET | Refills: 3 | Status: SHIPPED | OUTPATIENT
Start: 2022-12-19 | End: 2023-11-06

## 2022-12-19 NOTE — TELEPHONE ENCOUNTER
Pt returns call. Relayed provider message below. Pt informs she took fosamax about 2 or 3 years ago for treatment for osteoporosis (pt reports she was on Fosamax for one year).    Of note, pt informs of pain in lower back due to a fall on Friday. Fall occurred at work and pt has already been seen by a doctor for this (pt's work requires her to be treated at specific clinic as it is a workman's comp case). This is an FYI for provider, pt does not have questions or concerns about this at this time.    Routing to PCP to review and advise.  Carin Murrieta RN

## 2022-12-19 NOTE — TELEPHONE ENCOUNTER
Called patient with aid of  and left voicemail to call back and ask to speak to any triage nurse.    Carin Murrieta RN

## 2022-12-20 NOTE — TELEPHONE ENCOUNTER
Called pt and relayed provider message below. Patient was given an opportunity to ask questions, verbalized understanding of plan, and is agreeable.    Carin Murrieta RN

## 2022-12-20 NOTE — TELEPHONE ENCOUNTER
I really think we should restart on Fosamax once weekly, sit up or stand for 1 hour after you take it (just don't lay down).   Also make sure she is taking calcium and vit D supplements twice daily if possible.  We need to continue on this medicine for at least 5 years.  Manny Martínez MD  [facility]  [phone]

## 2022-12-28 ENCOUNTER — NURSE TRIAGE (OUTPATIENT)
Dept: NURSING | Facility: CLINIC | Age: 57
End: 2022-12-28

## 2022-12-28 NOTE — TELEPHONE ENCOUNTER
Bhavani calls and says that she cannot find her bottle of Fosomax anywhere and says that she needs another bottle of the Fosomax. Pt. Says that she has looked all over her house and in her car for this medicine, but cannot find it. RN then checked Epic and saw that pt. Has 3 refills of the Fosomax. RN told this to pt. And pt. Voiced understanding. RN told pt. That pt. Can call her pharmacy and tell the pharmacist about her lost Fosmax and see if she can get a refill of that medicine. Pt. Says that she will do this now. COVID 19 Nurse Triage Plan/Patient Instructions    Please be aware that novel coronavirus (COVID-19) may be circulating in the community. If you develop symptoms such as fever, cough, or SOB or if you have concerns about the presence of another infection including coronavirus (COVID-19), please contact your health care provider or visit [email or web address].     Disposition/Instructions    Home care recommended. Follow home care protocol based instructions.    Thank you for taking steps to prevent the spread of this virus.  o Limit your contact with others.  o Wear a simple mask to cover your cough.  o Wash your hands well and often.    Resources    [facility]: About COVID-19: [email or web address]    CDC: What to Do If You're Sick: www.cdc.gov/coronavirus/2019-ncov/about/steps-when-sick.html    CDC: Ending Home Isolation: www.cdc.gov/coronavirus/2019-ncov/hcp/disposition-in-home-patients.html     CDC: Caring for Someone: www.cdc.gov/coronavirus/2019-ncov/if-you-are-sick/care-for-someone.html     [facility]: Interim Guidance for Hospital Discharge to Home: www.health.[facility].[place]/diseases/coronavirus/hcp/hospdischarge.pdf    [facility] clinical trials (COVID-19 research studies): clinicalaffairs.[facility]/umn-clinical-trials     Below are the COVID-19 hotlines at the [place] [facility] ([facility]). Interpreters are available.   o For health questions: Call  [phone] or 1-264.148.4487 (7 a.m. to 7 p.m.)  o For questions about schools and childcare: Call [phone] or 1-150.352.2258 (7 a.m. to 7 p.m.)                     Reason for Disposition    General information question, no triage required and triager able to answer question    Additional Information    Negative: [1] Caller is not with the adult (patient) AND [2] reporting urgent symptoms    Negative: Lab result questions    Negative: Medication questions    Negative: Caller can't be reached by phone    Negative: Caller has already spoken to PCP or another triager    Negative: RN needs further essential information from caller in order to complete triage    Negative: Requesting regular office appointment    Negative: [1] Caller requesting NON-URGENT health information AND [2] PCP's office is the best resource    Negative: Health Information question, no triage required and triager able to answer question    Protocols used: INFORMATION ONLY CALL - NO TRIAGE-A-

## 2023-03-13 ENCOUNTER — MEDICAL CORRESPONDENCE (OUTPATIENT)
Dept: FAMILY MEDICINE | Facility: CLINIC | Age: 58
End: 2023-03-13

## 2023-04-03 ENCOUNTER — TRANSFERRED RECORDS (OUTPATIENT)
Dept: HEALTH INFORMATION MANAGEMENT | Facility: CLINIC | Age: 58
End: 2023-04-03

## 2023-04-15 ENCOUNTER — HEALTH MAINTENANCE LETTER (OUTPATIENT)
Age: 58
End: 2023-04-15

## 2023-05-19 ENCOUNTER — TRANSFERRED RECORDS (OUTPATIENT)
Dept: HEALTH INFORMATION MANAGEMENT | Facility: CLINIC | Age: 58
End: 2023-05-19

## 2023-09-10 ENCOUNTER — HEALTH MAINTENANCE LETTER (OUTPATIENT)
Age: 58
End: 2023-09-10

## 2023-09-26 ENCOUNTER — TRANSFERRED RECORDS (OUTPATIENT)
Dept: HEALTH INFORMATION MANAGEMENT | Facility: CLINIC | Age: 58
End: 2023-09-26
Payer: COMMERCIAL

## 2023-10-12 ENCOUNTER — TRANSFERRED RECORDS (OUTPATIENT)
Dept: HEALTH INFORMATION MANAGEMENT | Facility: CLINIC | Age: 58
End: 2023-10-12
Payer: COMMERCIAL

## 2023-10-19 ENCOUNTER — OFFICE VISIT (OUTPATIENT)
Dept: FAMILY MEDICINE | Facility: CLINIC | Age: 58
End: 2023-10-19
Payer: OTHER MISCELLANEOUS

## 2023-10-19 VITALS
SYSTOLIC BLOOD PRESSURE: 145 MMHG | WEIGHT: 116.2 LBS | DIASTOLIC BLOOD PRESSURE: 77 MMHG | HEART RATE: 52 BPM | BODY MASS INDEX: 24.39 KG/M2 | RESPIRATION RATE: 12 BRPM | TEMPERATURE: 97.7 F | HEIGHT: 58 IN | OXYGEN SATURATION: 98 %

## 2023-10-19 DIAGNOSIS — S46.001D INJURY OF RIGHT ROTATOR CUFF, SUBSEQUENT ENCOUNTER: ICD-10-CM

## 2023-10-19 DIAGNOSIS — Z01.818 PREOP GENERAL PHYSICAL EXAM: Primary | ICD-10-CM

## 2023-10-19 PROCEDURE — 99214 OFFICE O/P EST MOD 30 MIN: CPT | Performed by: FAMILY MEDICINE

## 2023-10-19 RX ORDER — ALENDRONATE SODIUM 70 MG/1
70 TABLET ORAL
Qty: 12 TABLET | Refills: 3 | Status: CANCELLED | OUTPATIENT
Start: 2023-10-19

## 2023-10-19 SDOH — HEALTH STABILITY: PHYSICAL HEALTH: ON AVERAGE, HOW MANY MINUTES DO YOU ENGAGE IN EXERCISE AT THIS LEVEL?: 20 MIN

## 2023-10-19 SDOH — HEALTH STABILITY: PHYSICAL HEALTH: ON AVERAGE, HOW MANY DAYS PER WEEK DO YOU ENGAGE IN MODERATE TO STRENUOUS EXERCISE (LIKE A BRISK WALK)?: 2 DAYS

## 2023-10-19 ASSESSMENT — SOCIAL DETERMINANTS OF HEALTH (SDOH)
IN A TYPICAL WEEK, HOW MANY TIMES DO YOU TALK ON THE PHONE WITH FAMILY, FRIENDS, OR NEIGHBORS?: MORE THAN THREE TIMES A WEEK
HOW OFTEN DO YOU ATTEND CHURCH OR RELIGIOUS SERVICES?: 1 TO 4 TIMES PER YEAR
HOW OFTEN DO YOU GET TOGETHER WITH FRIENDS OR RELATIVES?: TWICE A WEEK
DO YOU BELONG TO ANY CLUBS OR ORGANIZATIONS SUCH AS CHURCH GROUPS UNIONS, FRATERNAL OR ATHLETIC GROUPS, OR SCHOOL GROUPS?: NO

## 2023-10-19 ASSESSMENT — LIFESTYLE VARIABLES
HOW MANY STANDARD DRINKS CONTAINING ALCOHOL DO YOU HAVE ON A TYPICAL DAY: PATIENT DOES NOT DRINK
HOW OFTEN DO YOU HAVE SIX OR MORE DRINKS ON ONE OCCASION: NEVER

## 2023-10-19 NOTE — PROGRESS NOTES
[address] [address]  Phone: [phone]  Primary Provider: Dayan Martínez  Pre-op Performing Provider: DAYAN MARTÍNEZ      PREOPERATIVE EVALUATION:  Today's date: 10/19/2023    Bhavani is a 57 year old female who presents for a preoperative evaluation.      10/19/2023    12:50 PM   Additional Questions   Roomed by Manuel LEWIS.         10/19/2023    12:56 PM   Patient Reported Additional Medications   Patient reports taking the following new medications Allegra       Surgical Information:  Surgery/Procedure: Shoulder, rotator cuff surgery  Surgery Location: [facility]  Surgeon: Salvador Murphy  Surgery Date: 10/31/2023  Time of Surgery: 10 am  Where patient plans to recover: At home with family  Fax number for surgical facility: [phone]    Assessment & Plan     The proposed surgical procedure is considered INTERMEDIATE risk.    Preop general physical exam  Check below recommendations.    Injury of right rotator cuff, subsequent encounter  Scheduled for surgery for repair.             - No identified additional risk factors other than previously addressed    Antiplatelet or Anticoagulation Medication Instructions:   - Patient is on no antiplatelet or anticoagulation medications.    Additional Medication Instructions:  Patient is to take all scheduled medications on the day of surgery    RECOMMENDATION:  APPROVAL GIVEN to proceed with proposed procedure, without further diagnostic evaluation.            Subjective       HPI related to upcoming procedure: Right shoulder Arthroscopy, with rotator cuf repair, and subacromial decompression, distal clavicle excision.          10/19/2023    12:45 PM   Preop Questions   1. Have you ever had a heart attack or stroke? No   2. Have you ever had surgery on your heart or blood vessels, such as a stent placement, a coronary artery bypass, or surgery on an artery in your head, neck, heart, or legs? No   3. Do  you have chest pain with activity? No   4. Do you have a history of  heart failure? No   5. Do you currently have a cold, bronchitis or symptoms of other infection? No   6. Do you have a cough, shortness of breath, or wheezing? No   7. Do you or anyone in your family have previous history of blood clots? No   8. Do you or does anyone in your family have a serious bleeding problem such as prolonged bleeding following surgeries or cuts? NO   9. Have you ever had problems with anemia or been told to take iron pills? YES - previously.   10. Have you had any abnormal blood loss such as black, tarry or bloody stools, or abnormal vaginal bleeding? No   11. Have you ever had a blood transfusion? No   12. Are you willing to have a blood transfusion if it is medically needed before, during, or after your surgery? Yes   13. Have you or any of your relatives ever had problems with anesthesia? UNKNOWN - pt had vomiting previously when she had conscious sedation.   14. Do you have sleep apnea, excessive snoring or daytime drowsiness? No   15. Do you have any artifical heart valves or other implanted medical devices like a pacemaker, defibrillator, or continuous glucose monitor? No   16. Do you have artificial joints? No   17. Are you allergic to latex? No   18. Is there any chance that you may be pregnant? No       Health Care Directive:  Patient does not have a Health Care Directive or Living Will: Discussed advance care planning with patient; however, patient declined at this time.    Preoperative Review of :   reviewed - no record of controlled substances prescribed.      Status of Chronic Conditions:  See problem list for active medical problems.  Problems all longstanding and stable, except as noted/documented.  See ROS for pertinent symptoms related to these conditions.    HYPOTHYROIDISM - Patient has a longstanding history of chronic Hypothyroidism. Patient has been doing well, noting no tremor, insomnia, hair loss  or changes in skin texture. Continues to take medications as directed, without adverse reactions or side effects. Last TSH   Lab Results   Component Value Date    TSH 1.92 11/21/2022   .      Review of Systems  CONSTITUTIONAL: NEGATIVE for fever, chills, change in weight  INTEGUMENTARY/SKIN: NEGATIVE for worrisome rashes, moles or lesions  EYES: NEGATIVE for vision changes or irritation  ENT/MOUTH: NEGATIVE for ear, mouth and throat problems  RESP: NEGATIVE for significant cough or SOB  CV: NEGATIVE for chest pain, palpitations or peripheral edema  GI: NEGATIVE for nausea, abdominal pain, heartburn, or change in bowel habits  : NEGATIVE for frequency, dysuria, or hematuria  MUSCULOSKELETAL:right shoulder pain.  NEURO: NEGATIVE for weakness, dizziness or paresthesias  ENDOCRINE: NEGATIVE for temperature intolerance, skin/hair changes  HEME: NEGATIVE for bleeding problems  PSYCHIATRIC: NEGATIVE for changes in mood or affect    Patient Active Problem List    Diagnosis Date Noted    Onychomycosis 05/04/2021     Priority: Medium    2019 novel coronavirus disease (COVID-19) 01/05/2021     Priority: Medium    Episcleritis of left eye 06/11/2019     Priority: Medium    Pterygium of left eye 06/11/2019     Priority: Medium    Lumbar degenerative disc disease 11/05/2018     Priority: Medium    Localized osteoporosis with current pathological fracture, sequela 08/14/2018     Priority: Medium    Closed compression fracture of first lumbar vertebra, initial encounter 07/25/2018     Priority: Medium     IMO Regulatory Load OCT 2020      Compression fracture of T12 vertebra (H) 07/25/2018     Priority: Medium    Hypothyroidism due to Hashimoto's thyroiditis 03/21/2017     Priority: Medium    Rotator cuff syndrome 07/16/2014     Priority: Medium    Seasonal allergic rhinitis 04/11/2014     Priority: Medium    CARDIOVASCULAR SCREENING; LDL GOAL LESS THAN 100 10/31/2010     Priority: Medium      Past Medical History:   Diagnosis  "Date    Acquired hypothyroidism 1/21/2016    Grave's disease     High TSI titer, s/p I-131May 2009    Hypothyroidism due to Hashimoto's thyroiditis 3/21/2017    Menopausal state     Rotator cuff syndrome 7/16/2014    Seasonal allergic rhinitis 4/11/2014     Past Surgical History:   Procedure Laterality Date    COLONOSCOPY N/A 4/7/2017    Procedure: COLONOSCOPY;  Surgeon: Christos Pena MD;  Location:  GI     Current Outpatient Medications   Medication Sig Dispense Refill    alendronate (FOSAMAX) 70 MG tablet Take 1 tablet (70 mg) by mouth every 7 days 12 tablet 3    levothyroxine (SYNTHROID/LEVOTHROID) 75 MCG tablet Take 1 tablet (75 mcg) by mouth daily 90 tablet 3    clotrimazole (LOTRIMIN) 1 % external cream Apply topically 2 times daily For 3 weeks. (Patient not taking: Reported on 10/19/2023) 60 g 0       Allergies   Allergen Reactions    Pcn [Penicillins]         Social History     Tobacco Use    Smoking status: Never    Smokeless tobacco: Never   Substance Use Topics    Alcohol use: No     Alcohol/week: 0.0 standard drinks of alcohol       History   Drug Use No         Objective     BP (!) 145/77 (BP Location: Right arm, Patient Position: Sitting, Cuff Size: Adult Regular)   Pulse 52   Temp 97.7  F (36.5  C) (Oral)   Resp 12   Ht 1.473 m (4' 10\")   Wt 52.7 kg (116 lb 3.2 oz)   LMP 03/31/2006   SpO2 98%   BMI 24.29 kg/m      Physical Exam    GENERAL APPEARANCE: healthy, alert and no distress     EYES: EOMI,  PERRL     HENT: ear canals and TM's normal and nose and mouth without ulcers or lesions     NECK: no adenopathy, no asymmetry, masses, or scars and thyroid normal to palpation     RESP: lungs clear to auscultation - no rales, rhonchi or wheezes     CV: regular rates and rhythm, normal S1 S2, no S3 or S4 and no murmur, click or rub     ABDOMEN:  soft, nontender, no HSM or masses and bowel sounds normal     MS: extremities normal- no gross deformities noted, no evidence of inflammation in joints, " "FROM in all extremities.     SKIN: no suspicious lesions or rashes     NEURO: Normal strength and tone, sensory exam grossly normal, mentation intact and speech normal     PSYCH: mentation appears normal. and affect normal/bright     LYMPHATICS: No cervical adenopathy    No results for input(s): \"HGB\", \"PLT\", \"INR\", \"NA\", \"POTASSIUM\", \"CR\", \"A1C\" in the last [id number] hours.     Diagnostics:  No labs were ordered during this visit.   No EKG required, no history of coronary heart disease, significant arrhythmia, peripheral arterial disease or other structural heart disease.    Revised Cardiac Risk Index (RCRI):  The patient has the following serious cardiovascular risks for perioperative complications:   - No serious cardiac risks = 0 points     RCRI Interpretation: 0 points: Class I (very low risk - 0.4% complication rate)         Signed Electronically by: Manny Martínez MD  Copy of this evaluation report is provided to requesting physician.      "

## 2023-10-19 NOTE — PATIENT INSTRUCTIONS
Preparing for Your Surgery  Getting started  A nurse will call you to review your health history and instructions. They will give you an arrival time based on your scheduled surgery time. Please be ready to share:  Your doctor's clinic name and phone number  Your medical, surgical, and anesthesia history  A list of allergies and sensitivities  A list of medicines, including herbal treatments and over-the-counter drugs  Whether the patient has a legal guardian (ask how to send us the papers in advance)  Please tell us if you're pregnant--or if there's any chance you might be pregnant. Some surgeries may injure a fetus (unborn baby), so they require a pregnancy test. Surgeries that are safe for a fetus don't always need a test, and you can choose whether to have one.   If you have a child who's having surgery, please ask for a copy of Preparing for Your Child's Surgery.    Preparing for surgery  Within 10 to 30 days of surgery: Have a pre-op exam (sometimes called an H&P, or History and Physical). This can be done at a clinic or pre-operative center.  If you're having a , you may not need this exam. Talk to your care team.  At your pre-op exam, talk to your care team about all medicines you take. If you need to stop any medicines before surgery, ask when to start taking them again.  We do this for your safety. Many medicines can make you bleed too much during surgery. Some change how well surgery (anesthesia) drugs work.  Call your insurance company to let them know you're having surgery. (If you don't have insurance, call [phone].)  Call your clinic if there's any change in your health. This includes signs of a cold or flu (sore throat, runny nose, cough, rash, fever). It also includes a scrape or scratch near the surgery site.  If you have questions on the day of surgery, call your hospital or surgery center.  Eating and drinking guidelines  For your safety: Unless your surgeon tells you otherwise,  follow the guidelines below.  Eat and drink as usual until 8 hours before you arrive for surgery. After that, no food or milk.  Drink clear liquids until 2 hours before you arrive. These are liquids you can see through, like water, Gatorade, and Propel Water. They also include plain black coffee and tea (no cream or milk), candy, and breath mints. You can spit out gum when you arrive.  If you drink alcohol: Stop drinking it the night before surgery.  If your care team tells you to take medicine on the morning of surgery, it's okay to take it with a sip of water.  Preventing infection  Shower or bathe the night before and morning of your surgery. Follow the instructions your clinic gave you. (If no instructions, use regular soap.)  Don't shave or clip hair near your surgery site. We'll remove the hair if needed.  Don't smoke or vape the morning of surgery. You may chew nicotine gum up to 2 hours before surgery. A nicotine patch is okay.  Note: Some surgeries require you to completely quit smoking and nicotine. Check with your surgeon.  Your care team will make every effort to keep you safe from infection. We will:  Clean our hands often with soap and water (or an alcohol-based hand rub).  Clean the skin at your surgery site with a special soap that kills germs.  Give you a special gown to keep you warm. (Cold raises the risk of infection.)  Wear special hair covers, masks, gowns and gloves during surgery.  Give antibiotic medicine, if prescribed. Not all surgeries need antibiotics.  What to bring on the day of surgery  Photo ID and insurance card  Copy of your health care directive, if you have one  Glasses and hearing aids (bring cases)  You can't wear contacts during surgery  Inhaler and eye drops, if you use them (tell us about these when you arrive)  CPAP machine or breathing device, if you use them  A few personal items, if spending the night  If you have . . .  A pacemaker, ICD (cardiac defibrillator) or other  implant: Bring the ID card.  An implanted stimulator: Bring the remote control.  A legal guardian: Bring a copy of the certified (court-stamped) guardianship papers.  Please remove any jewelry, including body piercings. Leave jewelry and other valuables at home.  If you're going home the day of surgery  You must have a responsible adult drive you home. They should stay with you overnight as well.  If you don't have someone to stay with you, and you aren't safe to go home alone, we may keep you overnight. Insurance often won't pay for this.  After surgery  If it's hard to control your pain or you need more pain medicine, please call your surgeon's office.  Questions?   If you have any questions for your care team, list them here: _________________________________________________________________________________________________________________________________________________________________________ ____________________________________ ____________________________________ ____________________________________  For informational purposes only. Not to replace the advice of your health care provider. Copyright   2003, 2019 [place] [employer] [facility]. All rights reserved. Clinically reviewed by Sheila Gayle MD. [employer] 382034 - REV 12/22.    How to Take Your Medication Before Surgery  - Take all of your medications before surgery as usual

## 2023-10-31 ENCOUNTER — TRANSFERRED RECORDS (OUTPATIENT)
Dept: HEALTH INFORMATION MANAGEMENT | Facility: CLINIC | Age: 58
End: 2023-10-31
Payer: COMMERCIAL

## 2023-11-04 DIAGNOSIS — M80.80XS LOCALIZED OSTEOPOROSIS WITH CURRENT PATHOLOGICAL FRACTURE, SEQUELA: ICD-10-CM

## 2023-11-06 RX ORDER — ALENDRONATE SODIUM 70 MG/1
70 TABLET ORAL
Qty: 12 TABLET | Refills: 0 | Status: SHIPPED | OUTPATIENT
Start: 2023-11-06 | End: 2024-01-24

## 2023-11-08 ENCOUNTER — TRANSFERRED RECORDS (OUTPATIENT)
Dept: HEALTH INFORMATION MANAGEMENT | Facility: CLINIC | Age: 58
End: 2023-11-08
Payer: COMMERCIAL

## 2023-11-13 ENCOUNTER — TRANSFERRED RECORDS (OUTPATIENT)
Dept: HEALTH INFORMATION MANAGEMENT | Facility: CLINIC | Age: 58
End: 2023-11-13
Payer: COMMERCIAL

## 2023-12-04 ENCOUNTER — TRANSFERRED RECORDS (OUTPATIENT)
Dept: HEALTH INFORMATION MANAGEMENT | Facility: CLINIC | Age: 58
End: 2023-12-04
Payer: COMMERCIAL

## 2023-12-29 ENCOUNTER — TRANSFERRED RECORDS (OUTPATIENT)
Dept: HEALTH INFORMATION MANAGEMENT | Facility: CLINIC | Age: 58
End: 2023-12-29
Payer: COMMERCIAL

## 2024-01-24 ENCOUNTER — OFFICE VISIT (OUTPATIENT)
Dept: FAMILY MEDICINE | Facility: CLINIC | Age: 59
End: 2024-01-24
Attending: FAMILY MEDICINE
Payer: COMMERCIAL

## 2024-01-24 ENCOUNTER — ANCILLARY PROCEDURE (OUTPATIENT)
Dept: GENERAL RADIOLOGY | Facility: CLINIC | Age: 59
End: 2024-01-24
Attending: FAMILY MEDICINE
Payer: COMMERCIAL

## 2024-01-24 VITALS
OXYGEN SATURATION: 96 % | SYSTOLIC BLOOD PRESSURE: 123 MMHG | TEMPERATURE: 98 F | DIASTOLIC BLOOD PRESSURE: 70 MMHG | HEIGHT: 58 IN | BODY MASS INDEX: 24.98 KG/M2 | WEIGHT: 119 LBS | HEART RATE: 49 BPM | RESPIRATION RATE: 14 BRPM

## 2024-01-24 DIAGNOSIS — M54.50 CHRONIC BILATERAL LOW BACK PAIN WITHOUT SCIATICA: ICD-10-CM

## 2024-01-24 DIAGNOSIS — M80.80XS LOCALIZED OSTEOPOROSIS WITH CURRENT PATHOLOGICAL FRACTURE, SEQUELA: ICD-10-CM

## 2024-01-24 DIAGNOSIS — Z00.00 ROUTINE HISTORY AND PHYSICAL EXAMINATION OF ADULT: Primary | ICD-10-CM

## 2024-01-24 DIAGNOSIS — G89.29 CHRONIC BILATERAL LOW BACK PAIN WITHOUT SCIATICA: ICD-10-CM

## 2024-01-24 DIAGNOSIS — E06.3 HYPOTHYROIDISM DUE TO HASHIMOTO'S THYROIDITIS: ICD-10-CM

## 2024-01-24 DIAGNOSIS — M75.01 ADHESIVE CAPSULITIS OF RIGHT SHOULDER: ICD-10-CM

## 2024-01-24 LAB
CALCIUM SERPL-MCNC: 9.6 MG/DL (ref 8.6–10)
PHOSPHATE SERPL-MCNC: 3.5 MG/DL (ref 2.5–4.5)
TSH SERPL DL<=0.005 MIU/L-ACNC: 2.85 UIU/ML (ref 0.3–4.2)
VIT D+METAB SERPL-MCNC: 19 NG/ML (ref 20–50)

## 2024-01-24 PROCEDURE — 90480 ADMN SARSCOV2 VAC 1/ONLY CMP: CPT | Performed by: FAMILY MEDICINE

## 2024-01-24 PROCEDURE — 36415 COLL VENOUS BLD VENIPUNCTURE: CPT | Performed by: FAMILY MEDICINE

## 2024-01-24 PROCEDURE — 90750 HZV VACC RECOMBINANT IM: CPT | Performed by: FAMILY MEDICINE

## 2024-01-24 PROCEDURE — 90472 IMMUNIZATION ADMIN EACH ADD: CPT | Performed by: FAMILY MEDICINE

## 2024-01-24 PROCEDURE — 84100 ASSAY OF PHOSPHORUS: CPT | Performed by: FAMILY MEDICINE

## 2024-01-24 PROCEDURE — 72100 X-RAY EXAM L-S SPINE 2/3 VWS: CPT | Mod: TC | Performed by: RADIOLOGY

## 2024-01-24 PROCEDURE — 84443 ASSAY THYROID STIM HORMONE: CPT | Performed by: FAMILY MEDICINE

## 2024-01-24 PROCEDURE — 90471 IMMUNIZATION ADMIN: CPT | Performed by: FAMILY MEDICINE

## 2024-01-24 PROCEDURE — 99396 PREV VISIT EST AGE 40-64: CPT | Mod: 25 | Performed by: FAMILY MEDICINE

## 2024-01-24 PROCEDURE — 90715 TDAP VACCINE 7 YRS/> IM: CPT | Performed by: FAMILY MEDICINE

## 2024-01-24 PROCEDURE — 82306 VITAMIN D 25 HYDROXY: CPT | Performed by: FAMILY MEDICINE

## 2024-01-24 PROCEDURE — 82310 ASSAY OF CALCIUM: CPT | Performed by: FAMILY MEDICINE

## 2024-01-24 PROCEDURE — 99214 OFFICE O/P EST MOD 30 MIN: CPT | Mod: 25 | Performed by: FAMILY MEDICINE

## 2024-01-24 PROCEDURE — 91320 SARSCV2 VAC 30MCG TRS-SUC IM: CPT | Performed by: FAMILY MEDICINE

## 2024-01-24 RX ORDER — ALENDRONATE SODIUM 70 MG/1
70 TABLET ORAL
Qty: 12 TABLET | Refills: 3 | Status: SHIPPED | OUTPATIENT
Start: 2024-01-24 | End: 2024-04-25

## 2024-01-24 RX ORDER — LEVOTHYROXINE SODIUM 75 UG/1
75 TABLET ORAL DAILY
Qty: 90 TABLET | Refills: 3 | Status: SHIPPED | OUTPATIENT
Start: 2024-01-24

## 2024-01-24 ASSESSMENT — ENCOUNTER SYMPTOMS
BREAST MASS: 0
MYALGIAS: 1

## 2024-01-24 NOTE — PROGRESS NOTES
Preventive Care Visit  [facility]  Manny Martínez MD, Family Medicine  Jan 24, 2024       SUBJECTIVE:   Bhavani is a 58 year old, presenting for the following:  Physical        1/24/2024    10:48 AM   Additional Questions   Roomed by Marley Gutiérrez     Healthy Habits:     Getting at least 3 servings of Calcium per day:  Yes    Bi-annual eye exam:  Yes    Dental care twice a year:  Yes    Sleep apnea or symptoms of sleep apnea:  None    Diet:  Regular (no restrictions)    Frequency of exercise:  1 day/week    Duration of exercise:  15-30 minutes    Taking medications regularly:  Yes    Additional concerns today:  No      Today's PHQ-2 Score:       1/24/2024    10:38 AM   PHQ-2 ( 1999 [employer])   Q1: Little interest or pleasure in doing things 0   Q2: Feeling down, depressed or hopeless 0   PHQ-2 Score 0   Q1: Little interest or pleasure in doing things Not at all   Q2: Feeling down, depressed or hopeless Not at all   PHQ-2 Score 0           Via the Health Maintenance questionnaire, the patient has reported the following services have been completed -Mammogram, this information has been sent to the abstraction team.      Hypothyroidism Follow-up    Since last visit, patient describes the following symptoms: Weight stable, no hair loss, no skin changes, no constipation, no loose stools  Chronic/Recurring Back Pain Follow Up    Where is your back pain located? (Select all that apply) low back bilateral  How would you describe your back pain?  dull ache  Where does your back pain spread? nowhere  Since your last clinic visit for back pain, how has your pain changed? gradually worsening  Pt has been worsening pain during work, or when she is vacuuming, or standing for more than 3 hours, twisting,   Does your back pain interfere with your job? 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  Since your last visit, have you tried any new treatment? No  Have you ever done  Advance Care Planning? (For example, a Health Directive, POLST, or a discussion with a medical provider or your loved ones about your wishes): No, advance care planning information given to patient to review.  Advanced care planning was discussed at today's visit.    Social History     Tobacco Use    Smoking status: Never    Smokeless tobacco: Never   Substance Use Topics    Alcohol use: No     Alcohol/week: 0.0 standard drinks of alcohol             1/24/2024    10:38 AM   Alcohol Use   Prescreen: >3 drinks/day or >7 drinks/week? No     Reviewed orders with patient.  Reviewed health maintenance and updated orders accordingly - Yes  Patient Active Problem List   Diagnosis    CARDIOVASCULAR SCREENING; LDL GOAL LESS THAN 100    Seasonal allergic rhinitis    Rotator cuff syndrome    Hypothyroidism due to Hashimoto's thyroiditis    Closed compression fracture of first lumbar vertebra, initial encounter    Compression fracture of T12 vertebra (H)    Localized osteoporosis with current pathological fracture, sequela    Lumbar degenerative disc disease    2019 novel coronavirus disease (COVID-19)    Episcleritis of left eye    Pterygium of left eye    Onychomycosis    Adhesive capsulitis of right shoulder     Past Surgical History:   Procedure Laterality Date    COLONOSCOPY N/A 4/7/2017    Procedure: COLONOSCOPY;  Surgeon: Christos Pena MD;  Location:  GI       Social History     Tobacco Use    Smoking status: Never    Smokeless tobacco: Never   Substance Use Topics    Alcohol use: No     Alcohol/week: 0.0 standard drinks of alcohol     Family History   Problem Relation Age of Onset    Gastrointestinal Disease Father         hepatitis, unclear etiology, may be autoimmune    Gastrointestinal Disease Brother         hepatitis, unclear etiology    Diabetes Other          Current Outpatient Medications   Medication Sig Dispense Refill    alendronate (FOSAMAX) 70 MG tablet Take 1 tablet (70 mg) by mouth every 7 days 12  tablet 0    levothyroxine (SYNTHROID/LEVOTHROID) 75 MCG tablet Take 1 tablet (75 mcg) by mouth daily 90 tablet 3     Allergies   Allergen Reactions    Pcn [Penicillins]        Breast Cancer Screening:        10/19/2023    12:50 PM   Breast CA Risk Assessment (FHS-7)   Do you have a family history of breast, colon, or ovarian cancer? No / Unknown         Mammogram Screening: Recommended mammography every 1-2 years with patient discussion and risk factor consideration  Pertinent mammograms are reviewed under the imaging tab.    History of abnormal Pap smear: NO - age 30-65 PAP every 5 years with negative HPV co-testing recommended      Latest Ref Rng & Units 2/14/2020     9:19 AM 2/14/2020     9:15 AM 6/24/2015     9:05 AM   PAP / HPV   PAP (Historical)  NIL      HPV 16 DNA NEG^Negative  Negative  Negative    HPV 18 DNA NEG^Negative  Negative  Negative    Other HR HPV NEG^Negative  Negative  Negative      Reviewed and updated as needed this visit by clinical staff   Tobacco  Allergies  Meds              Reviewed and updated as needed this visit by Provider                  Past Medical History:   Diagnosis Date    Acquired hypothyroidism 1/21/2016    Grave's disease     High TSI titer, s/p I-131May 2009    Hypothyroidism due to Hashimoto's thyroiditis 3/21/2017    Menopausal state     Rotator cuff syndrome 7/16/2014    Seasonal allergic rhinitis 4/11/2014      Past Surgical History:   Procedure Laterality Date    COLONOSCOPY N/A 4/7/2017    Procedure: COLONOSCOPY;  Surgeon: Christos Pena MD;  Location:  GI     Review of Systems   Genitourinary:  Negative for pelvic pain, vaginal bleeding and vaginal discharge.   Musculoskeletal:  Positive for myalgias.       Review of Systems  Constitutional, HEENT, cardiovascular, pulmonary, gi and gu systems are negative, except as otherwise noted.    OBJECTIVE:   /70 (BP Location: Left arm, Patient Position: Sitting, Cuff Size: Adult Regular)   Pulse (!) 49   Temp 98  " F (36.7  C) (Oral)   Resp 14   Ht 1.473 m (4' 10\")   Wt 54 kg (119 lb)   LMP 03/31/2006   SpO2 96%   BMI 24.87 kg/m     Estimated body mass index is 24.87 kg/m  as calculated from the following:    Height as of this encounter: 1.473 m (4' 10\").    Weight as of this encounter: 54 kg (119 lb).  Physical Exam  GENERAL: alert and no distress  EYES: Eyes grossly normal to inspection, PERRL and conjunctivae and sclerae normal  HENT: ear canals and TM's normal, nose and mouth without ulcers or lesions  NECK: no adenopathy, no asymmetry, masses, or scars  RESP: lungs clear to auscultation - no rales, rhonchi or wheezes  CV: regular rate and rhythm, normal S1 S2, no S3 or S4, no murmur, click or rub, no peripheral edema  ABDOMEN: soft, nontender, no hepatosplenomegaly, no masses and bowel sounds normal  MS: very limited ROM of the Rt shoulder, mild tenderness on the top of the lumba spine (lower back), no paraspinal muscle tenderness.  SKIN: no suspicious lesions or rashes  NEURO: Normal strength and tone, mentation intact and speech normal  PSYCH: mentation appears normal, affect normal/bright  LYMPH: no cervical, supraclavicular, axillary, or inguinal adenopathy        ASSESSMENT/PLAN:   Hypothyroidism due to Hashimoto's thyroiditis  Check TSH and renew medicine accordingly.   - TSH WITH FREE T4 REFLEX; Future  - levothyroxine (SYNTHROID/LEVOTHROID) 75 MCG tablet; Take 1 tablet (75 mcg) by mouth daily  - TSH WITH FREE T4 REFLEX    Adhesive capsulitis of right shoulder  Follows up with [facility]. Improving gradually after surgery    Chronic bilateral low back pain without sciatica  With hx of chronic fracture, I will check Xray of the lumbar spine, meanwhile, continue on life style modification and strengthening exercises.     - XR Lumbar Spine 2/3 Views    Localized osteoporosis with current pathological fracture, sequela  Continue on Alendronate, and check below labs.   - Calcium; Future  - Phosphorus; " Future  - Vitamin D Deficiency; Future  - alendronate (FOSAMAX) 70 MG tablet; Take 1 tablet (70 mg) by mouth every 7 days  - Calcium  - Phosphorus  - Vitamin D Deficiency    Routine history and physical examination of adult  Today I counseled the patient about diet, regular exercise and weight loss planning.  - MA Screen Bilateral w/Escobar; Future          Counseling  Reviewed preventive health counseling, as reflected in patient instructions       Healthy diet/nutrition        She reports that she has never smoked. She has never used smokeless tobacco.        Signed Electronically by: Manny Martínez MD  Answers submitted by the patient for this visit:  Annual Preventive Visit (Submitted on 1/24/2024)  Chief Complaint: Annual Exam:  tenderness: No  breast mass: No  breast discharge: No

## 2024-01-25 ENCOUNTER — APPOINTMENT (OUTPATIENT)
Dept: INTERPRETER SERVICES | Facility: CLINIC | Age: 59
End: 2024-01-25
Payer: COMMERCIAL

## 2024-01-25 ENCOUNTER — TELEPHONE (OUTPATIENT)
Dept: FAMILY MEDICINE | Facility: CLINIC | Age: 59
End: 2024-01-25
Payer: COMMERCIAL

## 2024-01-25 DIAGNOSIS — M80.80XS LOCALIZED OSTEOPOROSIS WITH CURRENT PATHOLOGICAL FRACTURE, SEQUELA: Primary | ICD-10-CM

## 2024-01-25 RX ORDER — CHOLECALCIFEROL (VITAMIN D3) 50 MCG
1 TABLET ORAL DAILY
Qty: 90 TABLET | Refills: 3 | Status: SHIPPED | OUTPATIENT
Start: 2024-01-25

## 2024-01-25 NOTE — TELEPHONE ENCOUNTER
Labs are all normal except for low vit D, I recommend that we add vit D 2000 unit to her medication regimen.  I sent the prescription to the pharmacy (make sure she keep taking calcium twice daily)

## 2024-01-25 NOTE — TELEPHONE ENCOUNTER
RN called and spoke with patient. Relayed provider message. Relayed xray results comment from Dr Martínez, as requested from patient.    Patient was given an opportunity to ask questions, verbalized understanding of plan, and is agreeable.  Agustina BECK RN on 1/25/2024 at 8:43 AM

## 2024-02-16 ENCOUNTER — TRANSFERRED RECORDS (OUTPATIENT)
Dept: HEALTH INFORMATION MANAGEMENT | Facility: CLINIC | Age: 59
End: 2024-02-16
Payer: COMMERCIAL

## 2024-03-05 ENCOUNTER — TRANSFERRED RECORDS (OUTPATIENT)
Dept: HEALTH INFORMATION MANAGEMENT | Facility: CLINIC | Age: 59
End: 2024-03-05
Payer: COMMERCIAL

## 2024-04-12 ENCOUNTER — OFFICE VISIT (OUTPATIENT)
Dept: FAMILY MEDICINE | Facility: CLINIC | Age: 59
End: 2024-04-12
Payer: COMMERCIAL

## 2024-04-12 VITALS
DIASTOLIC BLOOD PRESSURE: 61 MMHG | SYSTOLIC BLOOD PRESSURE: 128 MMHG | RESPIRATION RATE: 16 BRPM | BODY MASS INDEX: 26.11 KG/M2 | OXYGEN SATURATION: 95 % | HEIGHT: 58 IN | TEMPERATURE: 98.3 F | HEART RATE: 52 BPM | WEIGHT: 124.4 LBS

## 2024-04-12 DIAGNOSIS — L72.3 INFLAMED SEBACEOUS CYST: ICD-10-CM

## 2024-04-12 DIAGNOSIS — L72.3 SEBACEOUS CYST: Primary | ICD-10-CM

## 2024-04-12 PROCEDURE — 99213 OFFICE O/P EST LOW 20 MIN: CPT | Performed by: PHYSICIAN ASSISTANT

## 2024-04-12 RX ORDER — SULFAMETHOXAZOLE/TRIMETHOPRIM 800-160 MG
1 TABLET ORAL 2 TIMES DAILY
Qty: 14 TABLET | Refills: 0 | Status: SHIPPED | OUTPATIENT
Start: 2024-04-12 | End: 2024-04-19

## 2024-04-12 NOTE — PROGRESS NOTES
"  Assessment & Plan     Sebaceous cyst  Not currently inflamed or infected but it sounds like it was recently. If becomes red and hot or more painful she will start the Bactrim. Otherwise follow up with general surgery for reconsidering removal.  - sulfamethoxazole-trimethoprim (BACTRIM DS) 800-160 MG tablet; Take 1 tablet by mouth 2 times daily for 7 days  - Adult General Surg Referral; Future    Inflamed sebaceous cyst    - sulfamethoxazole-trimethoprim (BACTRIM DS) 800-160 MG tablet; Take 1 tablet by mouth 2 times daily for 7 days  - Adult General Surg Referral; Future    Prescription drug management      Misa Beckham is a 58 year old, presenting for the following health issues:  Mass (C/O bump/lump on mid back right on spine with pain at touch X  years-)        4/12/2024     4:03 PM   Additional Questions   Roomed by Soo   Accompanied by Self   Patient declines .    Mass    History of Present Illness       Reason for visit:  I have a bumpon apimple on my back    She eats 4 or more servings of fruits and vegetables daily.She consumes 2 sweetened beverage(s) daily.She exercises with enough effort to increase her heart rate 10 to 19 minutes per day.  She exercises with enough effort to increase her heart rate 3 or less days per week. She is missing 1 dose(s) of medications per week.       Concern - Bump/lump on mid back on spine  Onset: 3-4 years  Description: pain at touch when she squeezes area or lay on back  Intensity: mild  Progression of Symptoms:  same  Accompanying Signs & Symptoms:   Previous history of similar problem:   Precipitating factors:        Worsened by: push on bone or touch or pressure  Alleviating factors:        Improved by:   Therapies tried and outcome: None        Objective    /61 (BP Location: Left arm, Patient Position: Sitting, Cuff Size: Adult Regular)   Pulse 52   Temp 98.3  F (36.8  C) (Oral)   Resp 16   Ht 1.473 m (4' 10\")   Wt 56.4 kg (124 lb 6.4 " oz)   LMP 03/31/2006   SpO2 95%   BMI 26.00 kg/m    Body mass index is 26 kg/m .  Physical Exam   GENERAL: No acute distress  HEENT: Normocephalic  SKIN: Middle thoracic back with cyst like mass with central punctate just right to the midline and another mass just left and superior to the cyst like mass.  NEURO: Alert and non-focal            Signed Electronically by: Cara Perkins PA-C

## 2024-04-25 ENCOUNTER — OFFICE VISIT (OUTPATIENT)
Dept: SURGERY | Facility: CLINIC | Age: 59
End: 2024-04-25
Payer: COMMERCIAL

## 2024-04-25 VITALS
WEIGHT: 124 LBS | RESPIRATION RATE: 16 BRPM | HEART RATE: 61 BPM | DIASTOLIC BLOOD PRESSURE: 68 MMHG | BODY MASS INDEX: 26.03 KG/M2 | HEIGHT: 58 IN | SYSTOLIC BLOOD PRESSURE: 116 MMHG | OXYGEN SATURATION: 96 %

## 2024-04-25 DIAGNOSIS — L72.3 INFLAMED SEBACEOUS CYST: Primary | ICD-10-CM

## 2024-04-25 PROCEDURE — 99203 OFFICE O/P NEW LOW 30 MIN: CPT | Performed by: SURGERY

## 2024-04-25 NOTE — LETTER
Showering Before Surgery      Your surgeon has asked you to take 2 showers before surgery.    Why is this important?  It is normal for bacteria (germs) to be on your skin. The skin protects us from these germs. When you have surgery, we cut the skin. Sometimes germs get into the cuts and cause infection (illness caused by germs). By following the instructions below and using special soap, you will lower the number of germs on your skin. This decreases your chance of infection.  Special soap  Buy or get 8 ounces of antiseptic surgical soap called 4% CHG. Common name brands of this soap are Hibiclens and Exidine.  You can find it at your local pharmacy, clinic or retail store. If you have trouble, ask your pharmacist to help you find the right substitute.  A note about shaving:  Do not shave within 12 inches of your incision (surgical cut) area for at least 3 days before surgery. Shaving can make small cuts in the skin. This puts you at a higher risk of infection.  Items you will need for each shower:  1 newly washed towel  4 ounces of one of the above soaps  Clean pajamas or clothes to change into    Follow these instructions:  Follow these steps the evening before surgery and the morning of surgery.  Wash your hair and body with your regular shampoo and soap. Make sure you rinse the shampoo and soap from your hair and body.  Using clean hands, apply about 2 ounces of soap gently on your skin from your ear lobes to your toes. Use on your groin area last. Do not use this soap on your face or head. If you get any soap in your eyes, ears or mouth, rinse right away.  Repeat step 2. It is very important to let the soap stay on your skin for at least 1 minute.    Rinse well and dry off using a clean towel.    If you feel any tingling, itching or other irritation, rinse right away. It is normal to feel some coolness on the skin after using the antiseptic soap. Your skin may feel a bit dry after the shower, but do not use  any lotions, creams or moisturizers. Do not use hair spray or other products in your hair.  5.  Dress in freshly washed clothes or pajamas. Use fresh pillowcases and sheets on your bed.    Repeat these steps the morning of surgery.  If you have any questions about showering or an allergy to CHG soap, please call your surgery center.    For informational purposes only. Not to replace the advice of your health care provider. Copyright   2012 [facility]. All rights reserved. Clinically reviewed by Infection Prevention and Practice and Education. [employer] [id number] - REV 12

## 2024-04-25 NOTE — PROGRESS NOTES
"HPI:  Bhavani presents today for a subcutaneous mass on her upper mid back for the past 3-4 years. She denies trauma at to the site.  It was stable until she started a new exercise routine a month ago or so; she describes it acutely getting larger then causing a stinging pain. She says that she had her daughter try to express some contents and had some success with this but still has a lump.    PE:  /68   Pulse 61   Resp 16   Ht 1.473 m (4' 10\")   Wt 56.2 kg (124 lb)   LMP 03/31/2006   SpO2 96%   BMI 25.92 kg/m    General appearance: well-nourished, no apparent distress  Skin: There is a 1.5 cm dermal cyst on the upper mid back just to the right of the midline.  The overlying skin shows a punctum which is dark. There is a scab cephalad to the punctum.  It is non-tender.         Plan:  We will schedule excision at her convenience. This can be done in the office under local anesthesia. She was asked not to manipulate the site any further in the interim.    Kory Pringle MD    Please route or send letter to:  Primary Care Provider (PCP)          "

## 2024-04-25 NOTE — LETTER
2024       Bhavani Calderon    RE: [id number]  : 1965    Bhavani Calderon has been scheduled for surgery on 24 at 3:15 pm at [facility] with Dr Kory Pringle.  We are located at [address], [address], [place] [id number]     Please check in at 3:00 pm.      You can eat and drink up until surgery      You should shower before your surgery with Hibiclens or Exidine soap.  This can be found at your local pharmacy or you can pick it up from our office for free.  Please call our office if you have any questions.     It is sometimes necessary to adjust the surgery schedule due to emergencies and additions to the schedule.  If your surgery is affected by this, we greatly appreciate your flexibility and understanding in this matter    It is best if you call regarding post-operative questions between the hours of 8:00 am & 3:00 pm Monday-Friday, so you have access to the daytime care team that know you best.  Prescription refills are accepted during regular office hours only.        If you have questions or concerns, please contact our office at [phone].

## 2024-05-08 ENCOUNTER — TRANSFERRED RECORDS (OUTPATIENT)
Dept: HEALTH INFORMATION MANAGEMENT | Facility: CLINIC | Age: 59
End: 2024-05-08
Payer: COMMERCIAL

## 2024-05-16 ENCOUNTER — OFFICE VISIT (OUTPATIENT)
Dept: SURGERY | Facility: CLINIC | Age: 59
End: 2024-05-16
Payer: COMMERCIAL

## 2024-05-16 VITALS
SYSTOLIC BLOOD PRESSURE: 118 MMHG | DIASTOLIC BLOOD PRESSURE: 68 MMHG | HEART RATE: 60 BPM | OXYGEN SATURATION: 100 % | RESPIRATION RATE: 16 BRPM

## 2024-05-16 DIAGNOSIS — L72.3 INFLAMED SEBACEOUS CYST: Primary | ICD-10-CM

## 2024-05-16 PROCEDURE — 88305 TISSUE EXAM BY PATHOLOGIST: CPT | Performed by: PATHOLOGY

## 2024-05-16 PROCEDURE — 11401 EXC TR-EXT B9+MARG 0.6-1 CM: CPT | Performed by: SURGERY

## 2024-05-16 NOTE — PROGRESS NOTES
General Surgery Operative Note      Pre-operative diagnosis: Right upper back cyst   Post-operative diagnosis: same   Procedure: Excision of right upper back cyst (1.0 cm)    Surgeon: Kory Nuñez MD       Anesthesia: Local    Estimated blood loss:   1 cc     Specimens: * Cannot find log *         The upper back was prepped and draped in standard sterile fashion. The skin overlying the mass was anesthetized with local anesthetic. An ellipse of skin, incorporating the central punctum was made with a length of 1.0 cm. The incision was carried into the subcutaneous tissue and the mass was completely excised from surrounding tissues using sharp dissection. The mass, which measured 1cm but was fragmented, was then passed off the field as specimen (only small representative section and dermis. Hemostasis was maintained throughout. The wound was then irrigated and closed with 4-0 Vicryl subcuticular sutures and SteriStrips. The patient tolerated the procedure well.  Kory Pringle MD

## 2024-05-16 NOTE — LETTER
May 16, 2024        RE:   Bhavani Calderon 1965      Dear Colleague,    Thank you for referring your patient, Bhavani Calderon, to [facility]. Please see a copy of my visit note below.    General Surgery Operative Note      Pre-operative diagnosis: Right upper back cyst   Post-operative diagnosis: same   Procedure: Excision of right upper back cyst (1.0 cm)    Surgeon: Kory Nuñez MD       Anesthesia: Local    Estimated blood loss:   1 cc     Specimens: * Cannot find log *     The upper back was prepped and draped in standard sterile fashion. The skin overlying the mass was anesthetized with local anesthetic. An ellipse of skin, incorporating the central punctum was made with a length of 1.0 cm. The incision was carried into the subcutaneous tissue and the mass was completely excised from surrounding tissues using sharp dissection. The mass, which measured 1cm but was fragmented, was then passed off the field as specimen (only small representative section and dermis. Hemostasis was maintained throughout. The wound was then irrigated and closed with 4-0 Vicryl subcuticular sutures and SteriStrips. The patient tolerated the procedure well.    Again, thank you for allowing me to participate in the care of your patient.      Sincerely,      Kory Pringle MD

## 2024-05-20 LAB
PATH REPORT.COMMENTS IMP SPEC: NORMAL
PATH REPORT.COMMENTS IMP SPEC: NORMAL
PATH REPORT.FINAL DX SPEC: NORMAL
PATH REPORT.GROSS SPEC: NORMAL
PATH REPORT.MICROSCOPIC SPEC OTHER STN: NORMAL
PATH REPORT.RELEVANT HX SPEC: NORMAL
PHOTO IMAGE: NORMAL

## 2024-05-24 ENCOUNTER — TRANSFERRED RECORDS (OUTPATIENT)
Dept: HEALTH INFORMATION MANAGEMENT | Facility: CLINIC | Age: 59
End: 2024-05-24
Payer: COMMERCIAL

## 2024-06-14 NOTE — ED PROVIDER NOTES
History     Chief Complaint:  Back Pain    HPI   Bhavani Calderon is a 52 year old female who presents to the ED for evaluation of with back pain. The patient reports that around 1300 today, she fell from the top of her stairs in her own home and landed on her tailbone. She is having ankle and low back pain secondary to the fall, so she presented to the ED for evaluation. She did not hit her head or lose consciousness. She denies dizziness, chest pain, headaches, numbness, tingling, or bowel/urinary incontinence. She additionally denies any anticoagulation. She denies any other concerns here in the ER today. Of note, she has pain in the back of her ankle that causes her to limp and also began after the fall.     Allergies:  Penicillins       Medications:    Levothyroxine    Past Medical History:    Hypothyroidism  Grave's disease  Rotator cuff syndrome    Past Surgical History:    Colonoscopy    Social History:  Marital Status:   [2]  Negative for tobacco use.  Negative for alcohol use.    Review of Systems   Cardiovascular: Negative for chest pain.   Gastrointestinal: Negative for constipation.   Genitourinary: Negative for enuresis.   Musculoskeletal: Positive for arthralgias and back pain.   Neurological: Negative for dizziness, numbness and headaches.   All other systems reviewed and are negative.    Physical Exam     Patient Vitals for the past 24 hrs:   BP Temp Temp src Pulse Heart Rate Resp SpO2 Height Weight   07/10/18 2207 - - - - - - 97 % - -   07/10/18 2206 123/57 98  F (36.7  C) Oral 53 53 18 - 1.524 m (5') 51.3 kg (113 lb)     Physical Exam  General: Female, resting on rNewton.  CV: 2+ DP and posterior tibial pulses in the bilateral feet.  GI: Abdomen soft, nontender, nondistended. No palpable mass. No rebound or guarding.  MSK: Normal range of motion of the extremities bilaterally. 5/5 strength bilateral LE with hip flexion, adduction and abduction, knee flexion and extension, foot dorsiflexion  See 6/11/24 TE   and plantarflexion. No edema. Tenderness in the midline left lumbar spine into the sacrum and coccyx regions. No crepitus. Mild tenderness over the left achilles area. Foot dorsiflexion intact with calf squeeze.   Skin: No rashes, erythema, or ecchymosis. Warm and dry.  Neuro: Alert and oriented. Answers all questions and follows all commands. No focal deficits appreciated. Normal sensation to all dermatomes of the lower extremities bilaterally. 1+ patellar reflex bilaterally. 5/5 great toe dorsiflexion strength bilaterally.  Psych: Normal affect.    Emergency Department Course   Imaging:  Radiographic findings were communicated with the patient who voiced understanding of the findings.  XR Lumbar spine 3 views,  No acute fracture or malalignment. Mild multilevel degenerative disease, as per radiology.     XR Sacrum and coccyx 2 views:   On the lateral view there is suggestion of a nondisplaced fracture of the mid sacrum, as per radiology.     Interventions:  2254 Acetaminophen 650 mg oral    Emergency Department Course:  Nursing notes and vitals reviewed. (2230) I performed an exam of the patient as documented above.     Medicine administered as documented above.    The patient was sent for a Lumbar Spine XR & Sacrum & Coccyx XR while in the emergency department, findings above.     (2350) I rechecked the patient and discussed the results ofher workup thus far.     Patient is ambulatory. Declines crutches/cane. Placed in a walking boot for comfort of LLE.     Findings and plan explained to the Patient. Patient discharged home with instructions regarding supportive care, medications, and reasons to return. The importance of close follow-up was reviewed.    I personally reviewed the laboratory results with the Patient and answered all related questions prior to discharge.     Impression & Plan      Medical Decision Making:  Bhavani Calderon is a 52 year old female who presents to the emergency department with  concerns for low back pain and tailbone pain, as well as right posterior lower leg pain after a fall. It sounds as though it is consistent with a mechanical fall. She had no symptoms prior to her fall. She is neurovascularly intact here on examination. She is tender over the sacral and low back as well as the right achilles. The achilles tendon function appears to be intact. There is no indication for x-rays of the leg, however x-rays of the low back and sacrum show a non-displaced sacral fracture without other abnormalities. As this is a stable pelvic fracture, her pain is controlled, and she is ambulatory, she is appropriate for discharge home. Weight bearing as tolerated. Work restrictions may apply, possibly for a prolonged time, and she is given off work for the next few days. She should follow up with orthopedics as needed and her PCP for further restrictions and physical therapy. She should return to emergency department with worsening of her symptoms. All questions were answered prior to discharge.    Diagnosis:    ICD-10-CM   1. Contusion of lower back, initial encounter S30.0XXA   2. Strain of right Achilles tendon, initial encounter S86.011A   3. Closed fracture of sacrum, unspecified portion of sacrum, initial encounter (H) S32.10XA     Disposition:  discharged to home    Scribe Disclosure:  I, Bharat Francois, am serving as a scribe on 7/10/2018 at 10:24 PM to personally document services performed by Melanie Garcia MD based on my observations and the provider's statements to me.     Bharat Francois  7/10/2018   [facility] EMERGENCY DEPARTMENT       Melanie Garcia MD  07/13/18 [id number]

## 2024-07-12 ENCOUNTER — TRANSFERRED RECORDS (OUTPATIENT)
Dept: HEALTH INFORMATION MANAGEMENT | Facility: CLINIC | Age: 59
End: 2024-07-12
Payer: COMMERCIAL

## 2024-07-19 ENCOUNTER — TRANSFERRED RECORDS (OUTPATIENT)
Dept: HEALTH INFORMATION MANAGEMENT | Facility: CLINIC | Age: 59
End: 2024-07-19
Payer: COMMERCIAL

## 2024-07-22 ENCOUNTER — TELEPHONE (OUTPATIENT)
Dept: FAMILY MEDICINE | Facility: CLINIC | Age: 59
End: 2024-07-22
Payer: COMMERCIAL

## 2024-07-22 NOTE — TELEPHONE ENCOUNTER
Patient Quality Outreach    Patient is due for the following:   Breast Cancer Screening - Mammogram    Next Steps:   Schedule a office visit for Mammogram    Type of outreach:    Sent [employer] message.    Next Steps:  Reach out within 90 days via Letter.    Max number of attempts reached: No. Will try again in 90 days if patient still on fail list.    Questions for provider review:    None           Pricilla Jefferson, [facility]

## 2024-07-22 NOTE — LETTER
August 20, 2024      Bhavani Calderon  [address] [address] [address]        Dear Bhavani,       You are in particular need of attention regarding:  -Breast Cancer Screening  -Cervical Cancer Screening     We are recommending that you:  {recommendations:-schedule a MAMMOGRAM which is due. We have mammogram available at our clinic; please call [phone].   Please disregard this reminder if you have had this exam elsewhere within the last year.  It would be helpful for us to have a copy of your mammogram report in our file so that we can best coordinate your care.    -schedule a PAP SMEAR EXAM which is due.  Please disregard this reminder if you have had this exam elsewhere within the last year.  It would be helpful for us to have a copy of your recent pap smear report in our file so that we can best coordinate your care.    Please call us at [phone] (or use [facility]) to address the above recommendations.     Thank you for trusting us with your health care.We look forward to supporting your healthcare needs in the future.      Sincerely,      Your [facility] Care Team

## 2024-07-23 ENCOUNTER — TRANSFERRED RECORDS (OUTPATIENT)
Dept: HEALTH INFORMATION MANAGEMENT | Facility: CLINIC | Age: 59
End: 2024-07-23
Payer: COMMERCIAL

## 2024-08-20 NOTE — TELEPHONE ENCOUNTER
Patient Quality Outreach    Patient is due for the following:   Breast Cancer Screening - Mammogram  Cervical Cancer Screening - PAP Needed    Next Steps:   Schedule a office visit for pap only and mammo    Type of outreach:    Sent letter.      Questions for provider review:    None           Pricilla Jefferson, CMA

## 2024-10-31 ENCOUNTER — TELEPHONE (OUTPATIENT)
Dept: FAMILY MEDICINE | Facility: CLINIC | Age: 59
End: 2024-10-31
Payer: COMMERCIAL

## 2024-10-31 NOTE — TELEPHONE ENCOUNTER
Patient Quality Outreach    Patient is due for the following:   Breast Cancer Screening - Mammogram    Next Steps:   Schedule a office visit for mammogram    Type of outreach:    Sent [employer] message.      Questions for provider review:    None           Anyi Ramires, CMA

## 2024-11-11 ENCOUNTER — TRANSFERRED RECORDS (OUTPATIENT)
Dept: HEALTH INFORMATION MANAGEMENT | Facility: CLINIC | Age: 59
End: 2024-11-11
Payer: COMMERCIAL

## 2024-12-06 ENCOUNTER — TRANSFERRED RECORDS (OUTPATIENT)
Dept: HEALTH INFORMATION MANAGEMENT | Facility: CLINIC | Age: 59
End: 2024-12-06
Payer: COMMERCIAL

## 2024-12-19 ENCOUNTER — TELEPHONE (OUTPATIENT)
Dept: FAMILY MEDICINE | Facility: CLINIC | Age: 59
End: 2024-12-19
Payer: COMMERCIAL

## 2024-12-19 NOTE — TELEPHONE ENCOUNTER
Patient Quality Outreach    Patient is due for the following:   Breast Cancer Screening - Mammogram    Action(s) Taken:   Schedule a office visit for mammogram    Type of outreach:    Sent [employer] message.    Questions for provider review:    None           Jasmina Michel MA

## 2024-12-26 ENCOUNTER — PATIENT OUTREACH (OUTPATIENT)
Dept: CARE COORDINATION | Facility: CLINIC | Age: 59
End: 2024-12-26
Payer: COMMERCIAL

## 2025-01-05 ENCOUNTER — TELEPHONE (OUTPATIENT)
Dept: FAMILY MEDICINE | Facility: CLINIC | Age: 60
End: 2025-01-05
Payer: COMMERCIAL

## 2025-01-05 DIAGNOSIS — S22.080S COMPRESSION FRACTURE OF T12 VERTEBRA, SEQUELA: Primary | ICD-10-CM

## 2025-01-06 NOTE — TELEPHONE ENCOUNTER
Clinic RN: Please investigate patient's chart or contact patient if the information cannot be found because the medication is listed as historical or discontinued. Confirm patient is taking this medication. Document findings and route refill encounter to provider for approval or denial.

## 2025-01-07 RX ORDER — ALENDRONATE SODIUM 70 MG/1
70 TABLET ORAL
Qty: 12 TABLET | Refills: 0 | Status: SHIPPED | OUTPATIENT
Start: 2025-01-07

## 2025-01-07 NOTE — TELEPHONE ENCOUNTER
"Pt calling.    She informs that she has been taking alendronate once weekly for past year and she has never stopped medication (see rx below). Pt reports that she may have been confused on April 12th when it was documented that she had stopped taking medication. She states, \"I am not good with medication names and I was probably confused but I never stopped taking the medication\"    Take 1 tablet (70 mg) by mouth every 7 days 12 tablet 3 ordered 01/24/2024 04/25/2024 -- --       Patient not taking. Reported on 4/12/2024     Routing to PCP to review and advise.  Carin MARSH RN    "

## 2025-01-07 NOTE — TELEPHONE ENCOUNTER
Please call, pt needs to be seen.  Manny Martínez MD  [facility]  [phone]

## 2025-01-07 NOTE — TELEPHONE ENCOUNTER
RN spoke to patient with aid of . Patient is not home and is unable to review her medications at this time.     Patient will call back and ask to speak to a triage nurse when she gets home from work today.     FAZAL Ellis, RN  [facility]

## 2025-01-09 ENCOUNTER — PATIENT OUTREACH (OUTPATIENT)
Dept: CARE COORDINATION | Facility: CLINIC | Age: 60
End: 2025-01-09
Payer: COMMERCIAL

## 2025-01-12 DIAGNOSIS — E06.3 HYPOTHYROIDISM DUE TO HASHIMOTO'S THYROIDITIS: ICD-10-CM

## 2025-01-13 RX ORDER — LEVOTHYROXINE SODIUM 75 UG/1
75 TABLET ORAL DAILY
Qty: 90 TABLET | Refills: 0 | Status: SHIPPED | OUTPATIENT
Start: 2025-01-13

## 2025-02-03 ENCOUNTER — OFFICE VISIT (OUTPATIENT)
Dept: FAMILY MEDICINE | Facility: CLINIC | Age: 60
End: 2025-02-03
Payer: COMMERCIAL

## 2025-02-03 ENCOUNTER — NURSE TRIAGE (OUTPATIENT)
Dept: FAMILY MEDICINE | Facility: CLINIC | Age: 60
End: 2025-02-03

## 2025-02-03 VITALS
HEIGHT: 59 IN | HEART RATE: 62 BPM | SYSTOLIC BLOOD PRESSURE: 119 MMHG | TEMPERATURE: 98.1 F | WEIGHT: 121.5 LBS | RESPIRATION RATE: 16 BRPM | DIASTOLIC BLOOD PRESSURE: 78 MMHG | BODY MASS INDEX: 24.49 KG/M2 | OXYGEN SATURATION: 97 %

## 2025-02-03 DIAGNOSIS — R11.0 NAUSEA: ICD-10-CM

## 2025-02-03 DIAGNOSIS — J10.1 INFLUENZA A: Primary | ICD-10-CM

## 2025-02-03 DIAGNOSIS — R50.81 FEVER IN OTHER DISEASES: ICD-10-CM

## 2025-02-03 DIAGNOSIS — R05.1 ACUTE COUGH: ICD-10-CM

## 2025-02-03 DIAGNOSIS — J06.9 UPPER RESPIRATORY TRACT INFECTION, UNSPECIFIED TYPE: ICD-10-CM

## 2025-02-03 LAB
FLUAV AG SPEC QL IA: POSITIVE
FLUBV AG SPEC QL IA: NEGATIVE

## 2025-02-03 PROCEDURE — 87804 INFLUENZA ASSAY W/OPTIC: CPT

## 2025-02-03 PROCEDURE — 99213 OFFICE O/P EST LOW 20 MIN: CPT

## 2025-02-03 PROCEDURE — 87635 SARS-COV-2 COVID-19 AMP PRB: CPT

## 2025-02-03 RX ORDER — OSELTAMIVIR PHOSPHATE 75 MG/1
75 CAPSULE ORAL 2 TIMES DAILY
Qty: 10 CAPSULE | Refills: 0 | Status: SHIPPED | OUTPATIENT
Start: 2025-02-03 | End: 2025-02-08

## 2025-02-03 RX ORDER — ONDANSETRON 4 MG/1
4 TABLET, ORALLY DISINTEGRATING ORAL EVERY 8 HOURS PRN
Qty: 30 TABLET | Refills: 0 | Status: SHIPPED | OUTPATIENT
Start: 2025-02-03

## 2025-02-03 ASSESSMENT — ENCOUNTER SYMPTOMS: HEADACHES: 1

## 2025-02-03 NOTE — TELEPHONE ENCOUNTER
Nurse Triage SBAR    Is this a 2nd Level Triage? YES, LICENSED PRACTITIONER REVIEW IS REQUIRED    Situation: Headache/Cough    Background: Illness started last Thursday 11/30. Exposed to positive influenza 2 weeks ago,close contact-took sister to emergency room on the 17th.  She is also a .      Assessment:   She is having a headache which she reports as her worst symptom.  She has some nausea.    She has a sore throat that started last Thursday along with a cough.  She is coughing up yellow/white/green sputumn. She reports burning in her lungs.  She has been having coughing spells and vomited once. She is also taking muccinex for cough.  She reports having the chills but has not been able to check her temperature.  She has been taking Ibuprofen for the chills.    She reports a runny nose.  She has been laying down all day on Saturday.  She is feeling weak, dizzy, has poor appetite.  She is drinking some gatorade.      Protocol Recommended Disposition:   See in Office Today, See More Appropriate Protocol    Recommendation: To be seen in the office today.  Appointment made with BILLY Gupta CNP at 3:10pm.   This writer encouraged fluids, and humidifier use, cough drops.  She was instructed to call back if develops breathing difficulty or becomes worse.      Does the patient meet one of the following criteria for ADS visit consideration? 16+ years old, with an [facility] PCP     TIP  Providers, please consider if this condition is appropriate for management at one of our Acute and Diagnostic Services sites.     If patient is a good candidate, please use dotphrase <dot>triageresponse and select Refer to ADS to document.      Reason for Disposition   Influenza suspected (i.e., fever and respiratory symptoms; probable influenza exposure)   Severe cough   SEVERE coughing spells (e.g., whooping sound after coughing, vomiting after coughing)    Additional Information   Negative: Influenza has been diagnosed  by a doctor (or NP/PA)   Negative: Cough and begins > 7 days after influenza EXPOSURE   Negative: Influenza EXPOSURE (close contact) within the last 7 days and fever or any respiratory symptoms (i.e., cough, runny or stuffy nose, sore throat)   Negative: Runny or stuffy nose and begins > 7 days after influenza EXPOSURE   Negative: Sore throat and begins > 7 days after influenza EXPOSURE   Negative: Bluish (or gray) lips or face   Negative: SEVERE difficulty breathing (e.g., struggling for each breath, speaks in single words)   Negative: Rapid onset of cough and has hives   Negative: Coughing started suddenly after medicine, an allergic food or bee sting   Negative: Difficulty breathing after exposure to flames, smoke, or fumes   Negative: Sounds like a life-threatening emergency to the triager   Negative: Previous asthma attacks and this feels like asthma attack   Negative: Dry cough (non-productive; no sputum or minimal clear sputum) and within 14 days of COVID-19 Exposure   Negative: MODERATE difficulty breathing (e.g., speaks in phrases, SOB even at rest, pulse 100-120) and still present when not coughing   Negative: Chest pain present when not coughing   Negative: Passed out (e.g., fainted, lost consciousness, blacked out and was not responding)   Negative: Patient sounds very sick or weak to the triager   Negative: MILD difficulty breathing (e.g., minimal/no SOB at rest, SOB with walking, pulse <100) and still present when not coughing   Negative: Coughed up > 1 tablespoon (15 ml) blood (Exception: Blood-tinged sputum.)   Negative: Fever > 103 F (39.4 C)   Negative: Fever > 101 F (38.3 C) and over 60 years of age   Negative: Fever > 100 F (37.8 C) and has diabetes mellitus or a weak immune system (e.g., HIV positive, cancer chemotherapy, organ transplant, splenectomy, chronic steroids)   Negative: Fever > 100 F (37.8 C) and bedridden (e.g., CVA, chronic illness, recovering from surgery)   Negative: Increasing  "ankle swelling   Negative: Wheezing is present    Answer Assessment - Initial Assessment Questions  1. TYPE of EXPOSURE: \"How were you exposed?\" (e.g., close contact, not a close contact)      *No Answer*  2. DATE of EXPOSURE: \"When did the exposure occur?\" (e.g., hour, days, weeks)      *No Answer*  3. PREGNANCY: \"Is there any chance you are pregnant?\" \"When was your last menstrual period?\"      *No Answer*  4. HIGH RISK for COMPLICATIONS: \"Do you have any heart or lung problems?\" \"Do you have a weakened immune system?\" (e.g., CHF, COPD, asthma, HIV positive, chemotherapy, renal failure, diabetes mellitus, sickle cell anemia)      *No Answer*  5. SYMPTOMS: \"Do you have any symptoms?\" (e.g., cough, fever, sore throat, difficulty breathing).      *No Answer*    Answer Assessment - Initial Assessment Questions  1. WORST SYMPTOM: \"What is your worst symptom?\" (e.g., cough, runny nose, muscle aches, headache, sore throat, fever)       *No Answer*  2. ONSET: \"When did your flu symptoms start?\"       *No Answer*  3. COUGH: \"How bad is the cough?\"        *No Answer*  4. RESPIRATORY DISTRESS: \"Describe your breathing.\"       *No Answer*  5. FEVER: \"Do you have a fever?\" If Yes, ask: \"What is your temperature, how was it measured, and when did it start?\"      *No Answer*  6. EXPOSURE: \"Were you exposed to someone with influenza?\"        *No Answer*  7. FLU VACCINE: \"Did you get a flu shot this year?\"      *No Answer*  8. HIGH RISK DISEASE: \"Do you have any chronic medical problems?\" (e.g., heart or lung disease, asthma, weak immune system, or other HIGH RISK conditions)      *No Answer*  9. PREGNANCY: \"Is there any chance you are pregnant?\" \"When was your last menstrual period?\"      *No Answer*  10. OTHER SYMPTOMS: \"Do you have any other symptoms?\"  (e.g., runny nose, muscle aches, headache, sore throat)        *No Answer*    Answer Assessment - Initial Assessment Questions  1. ONSET: \"When did the cough begin?\"       *No " "Answer*  2. SEVERITY: \"How bad is the cough today?\"       *No Answer*  3. SPUTUM: \"Describe the color of your sputum\" (e.g., none, dry cough; clear, white, yellow, green)      *No Answer*  4. HEMOPTYSIS: \"Are you coughing up any blood?\" If Yes, ask: \"How much?\" (e.g., flecks, streaks, tablespoons, etc.)      *No Answer*  5. DIFFICULTY BREATHING: \"Are you having difficulty breathing?\" If Yes, ask: \"How bad is it?\" (e.g., mild, moderate, severe)       *No Answer*  6. FEVER: \"Do you have a fever?\" If Yes, ask: \"What is your temperature, how was it measured, and when did it start?\"      *No Answer*  7. CARDIAC HISTORY: \"Do you have any history of heart disease?\" (e.g., heart attack, congestive heart failure)       *No Answer*  8. LUNG HISTORY: \"Do you have any history of lung disease?\"  (e.g., pulmonary embolus, asthma, emphysema)      *No Answer*  9. PE RISK FACTORS: \"Do you have a history of blood clots?\" (or: recent major surgery, recent prolonged travel, bedridden)      *No Answer*  10. OTHER SYMPTOMS: \"Do you have any other symptoms?\" (e.g., runny nose, wheezing, chest pain)        *No Answer*  11. PREGNANCY: \"Is there any chance you are pregnant?\" \"When was your last menstrual period?\"        *No Answer*  12. TRAVEL: \"Have you traveled out of the country in the last month?\" (e.g., travel history, exposures)        *No Answer*    Protocols used: Influenza Exposure-A-OH, Influenza - Seasonal-A-OH, Cough-A-OH    Lupis Briggs RN BSN  Clinic Nurse  [facility]     "

## 2025-02-03 NOTE — LETTER
February 3, 2025      Bhavani Calderon  [id number] [facility] [id number]        To Whom It May Concern:    Bhavani Calderon  was seen on 2/3/2025.  Please excuse her  through 2/6/2025 due to illness.    Sincerely,        BILLY Gupta CNP    Electronically signed

## 2025-02-03 NOTE — PROGRESS NOTES
Assessment & Plan     (J10.1) Influenza A  (primary encounter diagnosis)  Comment: Patient history and exam suggestive of upper respiratory infection due to viral etiology.  Influenza and COVID swabs collected. Influenza B negative, positive for Influenza A. Will follow up on COVID results when obtained, results pending. Will treat Influenza A w/ Tamiflu given patient symptoms worsening. Use of OTC medications such as flonase, mucinex, tylenol/ibuprofen, and honey for symptomatic treatment OK. Discussed medication risks and benefits of Tamiflu with patient in detail with patient verbal understanding. Patient fully understands and is agreeable with plan of care, at this point patient will follow up as needed unless acute concerns arise in the meantime.  Plan: oseltamivir (TAMIFLU) 75 MG capsule    (J06.9) Upper respiratory tract infection, unspecified type  Comment: as above.   Plan: Influenza A & B Antigen - Clinic Collect    (R05.1) Acute cough  Comment: as above.   Plan: COVID-19 Virus (Coronavirus) by PCR Nose    (R50.81) Fever in other diseases  Comment: as above.   Plan: COVID-19 Virus (Coronavirus) by PCR Nose    (R11.0) Nausea  Comment: noted. Zofran ordered. Discussed medication risks and benefits of Zofran with patient in detail with patient verbal understanding. Patient fully understands and is agreeable with plan of care, at this point patient will follow up as needed unless acute concerns arise in the meantime.  Plan: ondansetron (ZOFRAN ODT) 4 MG ODT tab      Misa Beckham is a 59 year old, presenting for the following health issues:  URI and Headache        2/3/2025     2:59 PM   Additional Questions   Roomed by Anyi AMAYA MA     History of Present Illness      She is taking medications regularly.     Illness started last Thursday 11/30. Exposed to positive influenza 2 weeks ago,close contact-took sister to emergency room on the 17th.  She is having a headache which she reports as her worst  "symptom.  She has some nausea.  She has a sore throat that started last Thursday along with a cough.  She is coughing up yellow/white/green sputumn. She reports burning in her lungs.  She has been having coughing spells and vomited once. She is also taking muccinex for cough.  She reports having the chills but has not been able to check her temperature.  She has been taking Ibuprofen for the chills.  She reports a runny nose.  She has been laying down all day on Saturday.  She is feeling weak, dizzy, has poor appetite.  She is drinking some Gatorade.    Review of Systems  Constitutional, HEENT, cardiovascular, pulmonary, gi and gu systems are negative, except as otherwise noted.      Objective    /78 (BP Location: Right arm, Patient Position: Sitting, Cuff Size: Adult Regular)   Pulse 62   Temp 98.1  F (36.7  C) (Oral)   Resp 16   Ht 1.499 m (4' 11\")   Wt 55.1 kg (121 lb 8 oz)   LMP 03/31/2006   SpO2 97%   BMI 24.54 kg/m    Body mass index is 24.54 kg/m .  Physical Exam  Vitals and nursing note reviewed.   Constitutional:       General: She is not in acute distress.     Appearance: Normal appearance. She is ill-appearing.   HENT:      Right Ear: Tympanic membrane, ear canal and external ear normal. There is no impacted cerumen.      Left Ear: Tympanic membrane, ear canal and external ear normal. There is no impacted cerumen.      Nose: Congestion present. No rhinorrhea.      Mouth/Throat:      Mouth: Mucous membranes are moist.      Pharynx: Posterior oropharyngeal erythema present. No oropharyngeal exudate.   Eyes:      General:         Right eye: No discharge.         Left eye: No discharge.      Conjunctiva/sclera: Conjunctivae normal.      Pupils: Pupils are equal, round, and reactive to light.   Cardiovascular:      Rate and Rhythm: Normal rate and regular rhythm.      Heart sounds: No murmur heard.     No friction rub. No gallop.   Pulmonary:      Effort: No respiratory distress.      Breath " sounds: Normal breath sounds. No stridor. No wheezing, rhonchi or rales.   Musculoskeletal:      Cervical back: No tenderness.   Lymphadenopathy:      Cervical: No cervical adenopathy.   Skin:     General: Skin is warm and dry.   Neurological:      General: No focal deficit present.      Mental Status: She is alert.   Psychiatric:         Mood and Affect: Mood normal.         Behavior: Behavior normal.         Thought Content: Thought content normal.         Judgment: Judgment normal.          Results for orders placed or performed in visit on 02/03/25 (from the past 24 hours)   Influenza A & B Antigen - Clinic Collect    Specimen: Nose; Swab   Result Value Ref Range    Influenza A antigen Positive (A) Negative    Influenza B antigen Negative Negative    Narrative    Test results must be correlated with clinical data. If necessary, results should be confirmed by a molecular assay or viral culture.         Signed Electronically by: BILLY Gupta CNP

## 2025-02-04 LAB — SARS-COV-2 RNA RESP QL NAA+PROBE: NEGATIVE

## 2025-03-01 ENCOUNTER — HEALTH MAINTENANCE LETTER (OUTPATIENT)
Age: 60
End: 2025-03-01

## 2025-03-30 DIAGNOSIS — S22.080S COMPRESSION FRACTURE OF T12 VERTEBRA, SEQUELA: ICD-10-CM

## 2025-03-30 NOTE — LETTER
April 2, 2025      Bhavani Calderon  [id number] [facility] [id number]      Dear Bhavani,    We recently received a call from your pharmacy requesting a refill of your medication.    A review of your chart indicates that an appointment is required with your provider.  Please call the clinic to schedule your appointment.    We have authorized one refill of your medication to allow time for you to schedule.   If you have a history of diabetes or high cholesterol, please come in fasting for the appointment. Fasting entails nothing to eat or drink 8 hours prior to your appointment; with the exception on water. You may take your medication the day of the appointment.    Thank you,      Manny Martínez MD

## 2025-03-31 RX ORDER — ALENDRONATE SODIUM 70 MG/1
70 TABLET ORAL
Qty: 12 TABLET | Refills: 0 | Status: SHIPPED | OUTPATIENT
Start: 2025-03-31

## 2025-04-04 PROBLEM — S32.010A CLOSED COMPRESSION FRACTURE OF L1 VERTEBRA, INITIAL ENCOUNTER (H): Status: RESOLVED | Noted: 2018-07-25 | Resolved: 2025-04-04

## 2025-04-04 PROBLEM — U07.1 2019 NOVEL CORONAVIRUS DISEASE (COVID-19): Status: RESOLVED | Noted: 2021-01-05 | Resolved: 2025-04-04

## 2025-04-04 PROBLEM — M51.369 LUMBAR DEGENERATIVE DISC DISEASE: Status: RESOLVED | Noted: 2018-11-05 | Resolved: 2025-04-04

## 2025-04-04 PROBLEM — S22.080A COMPRESSION FRACTURE OF T12 VERTEBRA (H): Status: ACTIVE | Noted: 2018-07-25

## 2025-04-09 ENCOUNTER — ANCILLARY PROCEDURE (OUTPATIENT)
Dept: MAMMOGRAPHY | Facility: CLINIC | Age: 60
End: 2025-04-09
Attending: FAMILY MEDICINE
Payer: COMMERCIAL

## 2025-04-09 DIAGNOSIS — Z12.31 VISIT FOR SCREENING MAMMOGRAM: ICD-10-CM

## 2025-04-09 PROCEDURE — 77067 SCR MAMMO BI INCL CAD: CPT | Mod: TC | Performed by: RADIOLOGY

## 2025-04-09 PROCEDURE — 77063 BREAST TOMOSYNTHESIS BI: CPT | Mod: TC | Performed by: RADIOLOGY

## 2025-04-14 ENCOUNTER — HOSPITAL ENCOUNTER (OUTPATIENT)
Dept: ULTRASOUND IMAGING | Facility: CLINIC | Age: 60
Discharge: HOME | End: 2025-04-14
Attending: FAMILY MEDICINE | Admitting: FAMILY MEDICINE
Payer: COMMERCIAL

## 2025-04-14 DIAGNOSIS — R92.8 ABNORMAL MAMMOGRAM: ICD-10-CM

## 2025-04-14 PROCEDURE — 76882 US LMTD JT/FCL EVL NVASC XTR: CPT | Mod: RT

## 2025-04-15 ENCOUNTER — ANCILLARY PROCEDURE (OUTPATIENT)
Dept: BONE DENSITY | Facility: CLINIC | Age: 60
End: 2025-04-15
Attending: FAMILY MEDICINE
Payer: COMMERCIAL

## 2025-04-15 PROCEDURE — 77080 DXA BONE DENSITY AXIAL: CPT | Mod: TC | Performed by: PHYSICIAN ASSISTANT

## (undated) DEVICE — KIT ENDO TURNOVER/PROCEDURE W/CLEAN A SCOPE LINERS 103888

## (undated) RX ORDER — ONDANSETRON 2 MG/ML
INJECTION INTRAMUSCULAR; INTRAVENOUS
Status: DISPENSED
Start: 2017-04-07

## (undated) RX ORDER — FENTANYL CITRATE 50 UG/ML
INJECTION, SOLUTION INTRAMUSCULAR; INTRAVENOUS
Status: DISPENSED
Start: 2017-04-07